# Patient Record
Sex: FEMALE | Race: WHITE | Employment: FULL TIME | URBAN - METROPOLITAN AREA
[De-identification: names, ages, dates, MRNs, and addresses within clinical notes are randomized per-mention and may not be internally consistent; named-entity substitution may affect disease eponyms.]

---

## 2017-03-21 ENCOUNTER — TELEPHONE (OUTPATIENT)
Dept: NEUROLOGY | Age: 32
End: 2017-03-21

## 2017-03-21 NOTE — TELEPHONE ENCOUNTER
Pt said she's been having to take extra dosages Gabapentin because of her headaches. She also said she can't touch the right side of her head.  Please give her a call back

## 2017-03-22 ENCOUNTER — OFFICE VISIT (OUTPATIENT)
Dept: INTERNAL MEDICINE CLINIC | Age: 32
End: 2017-03-22

## 2017-03-22 VITALS
OXYGEN SATURATION: 97 % | DIASTOLIC BLOOD PRESSURE: 70 MMHG | SYSTOLIC BLOOD PRESSURE: 118 MMHG | HEIGHT: 66 IN | WEIGHT: 136.4 LBS | TEMPERATURE: 98.3 F | BODY MASS INDEX: 21.92 KG/M2 | RESPIRATION RATE: 17 BRPM | HEART RATE: 95 BPM

## 2017-03-22 DIAGNOSIS — R53.83 OTHER FATIGUE: Primary | ICD-10-CM

## 2017-03-22 DIAGNOSIS — M32.9 SLE (SYSTEMIC LUPUS ERYTHEMATOSUS) (HCC): ICD-10-CM

## 2017-03-22 DIAGNOSIS — E03.9 ACQUIRED HYPOTHYROIDISM: ICD-10-CM

## 2017-03-22 NOTE — MR AVS SNAPSHOT
Visit Information Date & Time Provider Department Dept. Phone Encounter #  
 3/22/2017  8:45 AM Caryle Macho, MD [facility]  Follow-up Instructions Return in about 4 weeks (around 4/19/2017) for Physical - 30 minute appointment. Upcoming Health Maintenance Date Due INFLUENZA AGE 9 TO ADULT 8/1/2016 PAP AKA CERVICAL CYTOLOGY 2/10/2017 DTaP/Tdap/Td series (2 - Td) 2/1/2022 Allergies as of 3/22/2017  Review Complete On: 4/13/2016 By: Eli Curtis MD  
  
 Severity Noted Reaction Type Reactions Latex Medium 11/19/2012    Hives Guaifenesin  11/19/2012    Other (comments) Gained weight Ibuprofen  04/03/2011    Other (comments) \"started burning really bad\" Influenza Virus Vaccine, Specific  02/03/2016    Other (comments) Quadrivalent Severe HA and neurological symptoms Oxycodone  09/22/2014   Side Effect Other (comments) Shellfish Derived  04/07/2014    Rash  
 Sulfa (Sulfonamide Antibiotics)  01/24/2014   Systemic Hives, Other (comments)  
 achy [id number] Antimigraine Agents  10/02/2014    Palpitations Chest pain Wheat  11/19/2012    Other (comments) Mouth bleeds Current Immunizations  Reviewed on 3/10/2014 Name Date Hepatitis B Vaccine 8/1/2008, 3/1/2008, 2/1/2008 Influenza Vaccine 10/30/2013 Influenza Vaccine (Quad) PF 9/17/2014 TDAP Vaccine 2/1/2012 Not reviewed this visit You Were Diagnosed With   
  
 Codes Comments Other fatigue    -  Primary ICD-10-CM: R53.83 ICD-9-CM: 780.79 SLE (systemic lupus erythematosus) (HCC)     ICD-10-CM: M32.9 ICD-9-CM: 710.0 Acquired hypothyroidism     ICD-10-CM: E03.9 ICD-9-CM: 892. 9 Vitals BP Pulse Temp Resp Height(growth percentile) Weight(growth percentile) 118/70 (BP 1 Location: Right arm, BP Patient Position: Sitting) 95 98.3 °F (36.8 °C) 17 5' 6\" (1.676 m) 136 lb 6.4 oz (61.9 kg) LMP SpO2 BMI OB Status Smoking Status 03/08/2017 97% 22.02 kg/m2 Having regular periods Never Smoker Vitals History BMI and BSA Data Body Mass Index Body Surface Area 22.02 kg/m 2 1.7 m 2 Preferred Pharmacy Pharmacy Name Phone [facility] [address], [address] [facility]. [phone] Your Updated Medication List  
  
   
This list is accurate as of: 3/22/17  9:54 AM.  Always use your most recent med list. ALLEGRA 180 mg tablet Generic drug:  fexofenadine Take  by mouth. ARMOUR THYROID 90 mg tablet Generic drug:  thyroid (Pork) TAKE ONE TABLET BY MOUTH DAILY  
  
 baclofen 10 mg tablet Commonly known as:  LIORESAL Take 5 mg up to 3 times a day CALCIUM PO Take 1 tablet by mouth daily. gabapentin 300 mg capsule Commonly known as:  NEURONTIN Take 1 Cap by mouth three (3) times daily. multivitamin tablet Commonly known as:  ONE A DAY Take 2 Tabs by mouth daily. phenazopyridine 200 mg tablet Commonly known as:  PYRIDIUM Take 1 tablet by mouth three (3) times daily as needed for Pain.  
  
 potassium 99 mg tablet Take 99 mg by mouth every fourty-eight (48) hours. TYLENOL SINUS PO Take  by mouth. VITAMIN B-12 1,000 mcg tablet Generic drug:  cyanocobalamin Take 1,000 mcg by mouth daily. VITAMIN C PO Take 1 tablet by mouth daily. VITAMIN D3 PO Take 2,000 Units by mouth daily. We Performed the Following ODILIA IFA W/REFLEX  CASCADE [92286 CPT(R)] CBC WITH AUTOMATED DIFF [96499 CPT(R)] COMPLEMENT, C3 [id number] CPT(R)] COMPLEMENT, C4 [id number] CPT(R)] HEMOGLOBIN A1C WITH EAG [51209 CPT(R)] METABOLIC PANEL, COMPREHENSIVE [43496 CPT(R)] REFERRAL TO RHEUMATOLOGY [WBI86 Custom] SED RATE (ESR) [id number] CPT(R)] THYROID ANTIBODY PANEL [29834 CPT(R)] THYROID PANEL [id number] CPT(R)] TSH 3RD GENERATION [26178 CPT(R)] Follow-up Instructions Return in about 4 weeks (around 4/19/2017) for Physical - 30 minute appointment. Referral Information Referral ID Referred By Referred To  
  
 [id number] Puja Jaimes MD   
   [address] [facility], [address] [place] [address] Road Phone: [phone] Fax: [phone] Visits Status Start Date End Date 1 New Request 3/22/17 3/22/18 If your referral has a status of pending review or denied, additional information will be sent to support the outcome of this decision. Patient Instructions It was a pleasure to see you! As discussed: 
 
Complete the labs Schedule with Rheumatology Fatigue: Care Instructions Your Care Instructions Fatigue is a feeling of tiredness, exhaustion, or lack of energy. You may feel fatigue because of too much or not enough activity. It can also come from stress, lack of sleep, boredom, and poor diet. Many medical problems, such as viral infections, can cause fatigue. Emotional problems, especially depression, are often the cause of fatigue. Fatigue is most often a symptom of another problem. Treatment for fatigue depends on the cause. For example, if you have fatigue because you have a certain health problem, treating this problem also treats your fatigue. If depression or anxiety is the cause, treatment may help. Follow-up care is a key part of your treatment and safety. Be sure to make and go to all appointments, and call your doctor if you are having problems. It's also a good idea to know your test results and keep a list of the medicines you take. How can you care for yourself at home? · Get regular exercise. But don't overdo it. Go back and forth between rest and exercise. · Get plenty of rest. 
· Eat a healthy diet. Do not skip meals, especially breakfast. 
· Reduce your use of caffeine, tobacco, and alcohol. Caffeine is most often found in coffee, tea, cola drinks, and chocolate. · Limit medicines that can cause fatigue. This includes tranquilizers and cold and allergy medicines. When should you call for help? Watch closely for changes in your health, and be sure to contact your doctor if: 
· You have new symptoms such as fever or a rash. · Your fatigue gets worse. · You have been feeling down, depressed, or hopeless. Or you may have lost interest in things that you usually enjoy. · You are not getting better as expected. Where can you learn more? Go to http://margaret-nikki.info/. Enter P653 in the search box to learn more about \"Fatigue: Care Instructions. \" Current as of: May 27, 2016 Content Version: 11.1 © 7888-2034 [employer]. Care instructions adapted under license by [employer] (which disclaims liability or warranty for this information). If you have questions about a medical condition or this instruction, always ask your healthcare professional. [place] [address] any warranty or liability for your use of this information. Introducing [facility]! Dear Jarocho Wolfe: Thank you for requesting a [employer] account. Our records indicate that you already have an active [employer] account. You can access your account anytime at [email or web address] Did you know that you can access your hospital and ER discharge instructions at any time in [employer]? You can also review all of your test results from your hospital stay or ER visit. Additional Information If you have questions, please visit the Frequently Asked Questions section of the [employer] website at [email or web address] Remember, [employer] is NOT to be used for urgent needs. For medical emergencies, dial 911. Now available from your iPhone and Android! Please provide this summary of care documentation to your next provider. Your primary care clinician is listed as Suly Friendly.  If you have any questions after today's visit, please call [phone].
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
## 2017-03-22 NOTE — PATIENT INSTRUCTIONS
It was a pleasure to see you! As discussed:    Complete the labs   Schedule with Rheumatology     Fatigue: Care Instructions  Your Care Instructions  Fatigue is a feeling of tiredness, exhaustion, or lack of energy. You may feel fatigue because of too much or not enough activity. It can also come from stress, lack of sleep, boredom, and poor diet. Many medical problems, such as viral infections, can cause fatigue. Emotional problems, especially depression, are often the cause of fatigue. Fatigue is most often a symptom of another problem. Treatment for fatigue depends on the cause. For example, if you have fatigue because you have a certain health problem, treating this problem also treats your fatigue. If depression or anxiety is the cause, treatment may help. Follow-up care is a key part of your treatment and safety. Be sure to make and go to all appointments, and call your doctor if you are having problems. It's also a good idea to know your test results and keep a list of the medicines you take. How can you care for yourself at home? · Get regular exercise. But don't overdo it. Go back and forth between rest and exercise. · Get plenty of rest.  · Eat a healthy diet. Do not skip meals, especially breakfast.  · Reduce your use of caffeine, tobacco, and alcohol. Caffeine is most often found in coffee, tea, cola drinks, and chocolate. · Limit medicines that can cause fatigue. This includes tranquilizers and cold and allergy medicines. When should you call for help? Watch closely for changes in your health, and be sure to contact your doctor if:  · You have new symptoms such as fever or a rash. · Your fatigue gets worse. · You have been feeling down, depressed, or hopeless. Or you may have lost interest in things that you usually enjoy. · You are not getting better as expected. Where can you learn more? Go to http://margaret-nikki.info/.   Enter W892 in the search box to learn more about \"Fatigue: Care Instructions. \"  Current as of: May 27, 2016  Content Version: 11.1  © 3648-3602 [employer]. Care instructions adapted under license by [employer] (which disclaims liability or warranty for this information). If you have questions about a medical condition or this instruction, always ask your healthcare professional. [address] any warranty or liability for your use of this information.

## 2017-04-11 ENCOUNTER — OFFICE VISIT (OUTPATIENT)
Dept: NEUROLOGY | Age: 32
End: 2017-04-11

## 2017-04-11 VITALS
HEART RATE: 85 BPM | HEIGHT: 66 IN | WEIGHT: 133 LBS | OXYGEN SATURATION: 98 % | DIASTOLIC BLOOD PRESSURE: 72 MMHG | BODY MASS INDEX: 21.38 KG/M2 | SYSTOLIC BLOOD PRESSURE: 110 MMHG | RESPIRATION RATE: 15 BRPM

## 2017-04-11 DIAGNOSIS — M54.81 OCCIPITAL NEURALGIA OF RIGHT SIDE: Primary | ICD-10-CM

## 2017-04-11 DIAGNOSIS — E55.9 VITAMIN D DEFICIENCY: ICD-10-CM

## 2017-04-11 RX ORDER — LAMOTRIGINE 25 MG/1
TABLET ORAL
Qty: 60 TAB | Refills: 1 | Status: SHIPPED | OUTPATIENT
Start: 2017-04-11 | End: 2017-06-06 | Stop reason: SDUPTHER

## 2017-04-11 NOTE — PROGRESS NOTES
Chief Complaint   Patient presents with    Migraine         HISTORY OF PRESENT ILLNESS  Suzanne Acosta came back for a follow up. She was doing quite well until about a few weeks ago when the occipital pain came back. The pain radiates to the vertex and is quite disabling. She has been using gabapentin up to 1200 mg at bedtime which helps but makes her very tired the next day. She was prescribed baclofen before but she never tried it because she was concerned about side effects. Current Outpatient Prescriptions   Medication Sig    lamoTRIgine (LAMICTAL) 25 mg tablet 1 daily for 2 weeks, then 2 daily    gabapentin (NEURONTIN) 300 mg capsule Take 1 Cap by mouth three (3) times daily.  multivitamin (ONE A DAY) tablet Take 2 Tabs by mouth daily.  CHOLECALCIFEROL, VITAMIN D3, (VITAMIN D3 PO) Take 2,000 Units by mouth daily.  ARMOUR THYROID 90 mg tablet TAKE ONE TABLET BY MOUTH DAILY    fexofenadine (ALLEGRA) 180 mg tablet Take  by mouth.  baclofen (LIORESAL) 10 mg tablet Take 5 mg up to 3 times a day    PSEUDOEPHEDRINE/ACETAMINOPHEN (TYLENOL SINUS PO) Take  by mouth.  CALCIUM PO Take 1 tablet by mouth daily.  ASCORBATE CALCIUM (VITAMIN C PO) Take 1 tablet by mouth daily.  phenazopyridine (PYRIDIUM) 200 mg tablet Take 1 tablet by mouth three (3) times daily as needed for Pain.  potassium 99 mg tablet Take 99 mg by mouth every fourty-eight (48) hours.  cyanocobalamin (VITAMIN B-12) 1,000 mcg tablet Take 1,000 mcg by mouth daily. No current facility-administered medications for this visit. PHYSICAL EXAMINATION:    Visit Vitals    /72    Pulse 85    Resp 15    Ht 5' 6\" (1.676 m)    Wt 60.3 kg (133 lb)    SpO2 98%    BMI 21.47 kg/m2       NEUROLOGICAL EXAMINATION:     Mental Status:   Alert and oriented to person, place, and time with recent and remote memory intact. Attention span and concentration are normal. Speech is fluent with a full fund of knowledge. Cranial Nerves:    II, III, IV, VI:  Visual acuity grossly intact. Visual fields are normal.    Pupils are equal, round, and reactive to light and accommodation. Extra-ocular movements are full and fluid. Fundoscopic exam was benign, no ptosis or nystagmus. V-XII: Hearing is grossly intact. Facial features are symmetric, with normal sensation and strength. The palate rises symmetrically and the tongue protrudes midline. Sternocleidomastoids 5/5. Motor Examination: Normal tone, bulk, and strength. 5/5 muscle strength throughout. No cogwheel rigidity or clonus present. Sensory exam:  Normal throughout to pinprick, temperature, and vibration sense. Normal proprioception. Coordination:  Heel-to-shin was smooth and symmetrical bilaterally. Finger to nose and rapid arm movement testing was normal.   No resting or intention tremor    Gait and Station:  Steady while walking on toes, heels, and with tandem walking. Normal arm swing. No Rhomberg or pronator drift. No muscle wasting or fasiculations noted. Reflexes:  DTRs 2+ throughout. Toes downgoing. LABS / IMAGING  MRI of the brain with and without contrast was normal.    ASSESSMENT    ICD-10-CM ICD-9-CM    1. Occipital neuralgia of right side M54.81 723.8 lamoTRIgine (LAMICTAL) 25 mg tablet   2. Vitamin D deficiency E55.9 268.9 VITAMIN D, 1, 25 DIHYDROXY     PROCEDURE NOTE:  We discussed technical aspects, meds used, potential benefits (resolution or improvement of sx) as well as potential risks (bleeding, infection, lack of efficacy) of occipital nerve block and she would like to proceed. An occipital nerve was performed on right side using a mixture of 1 ml of 10mg/ml kenalog along with 2 ml of 1% Lidocaine (without epinephrine). 3 ml of mixture was injected on each side. A 25 gauge, 1.5 inch needle was used. Patient tolerated the procedure well. There were no complications.        DISCUSSION  Ms. Kendrick Harris has occipital neuralgia with underlying migraines. An occipital nerve block was performed on the right side with immediate relief of her pain.   We will try her on lamotrigine 25 mg daily for  2 weeks and then 50 mg daily  Side effect profile of this medication was discussed including a rash  She may wean off gabapentin slowly over the next 2-3 weeks    Rachana Abraham MD  Diplomate, American Board of Psychiatry & Neurology (Neurology)  Diplomate, [address] [place] [address] Rd., [address] of Psychiatry & Neurology (Clinical Neurophysiology)  Diplomate, American Board of Electrodiagnostic Medicine

## 2017-04-11 NOTE — MR AVS SNAPSHOT
Visit Information Date & Time Provider Department Dept. Phone Encounter #  
 4/11/2017  3:30 PM Krystal Rothman MD [facility] at [facility] [phone] Follow-up Instructions Return in about 3 months (around 7/11/2017). Upcoming Health Maintenance Date Due INFLUENZA AGE 9 TO ADULT 8/1/2016 PAP AKA CERVICAL CYTOLOGY 2/10/2017 DTaP/Tdap/Td series (2 - Td) 2/1/2022 Allergies as of 4/11/2017  Review Complete On: 4/11/2017 By: Krystal Rothman MD  
  
 Severity Noted Reaction Type Reactions Latex Medium 11/19/2012    Hives Guaifenesin  11/19/2012    Other (comments) Gained weight Ibuprofen  04/03/2011    Other (comments) \"started burning really bad\" Influenza Virus Vaccine, Specific  02/03/2016    Other (comments) Quadrivalent Severe HA and neurological symptoms Oxycodone  09/22/2014   Side Effect Other (comments) Shellfish Derived  04/07/2014    Rash  
 Sulfa (Sulfonamide Antibiotics)  01/24/2014   Systemic Hives, Other (comments)  
 achy [id number] Antimigraine Agents  10/02/2014    Palpitations Chest pain Wheat  11/19/2012    Other (comments) Mouth bleeds Current Immunizations  Reviewed on 3/10/2014 Name Date Hepatitis B Vaccine 8/1/2008, 3/1/2008, 2/1/2008 Influenza Vaccine 10/30/2013 Influenza Vaccine (Quad) PF 9/17/2014 TDAP Vaccine 2/1/2012 Not reviewed this visit You Were Diagnosed With   
  
 Codes Comments Occipital neuralgia of right side    -  Primary ICD-10-CM: M54.81 ICD-9-CM: 723.8 Vitamin D deficiency     ICD-10-CM: E55.9 ICD-9-CM: 268.9 Vitals BP Pulse Resp Height(growth percentile) Weight(growth percentile) LMP  
 110/72 85 15 5' 6\" (1.676 m) 133 lb (60.3 kg) 03/08/2017 SpO2 BMI OB Status Smoking Status 98% 21.47 kg/m2 Having regular periods Never Smoker Vitals History BMI and BSA Data Body Mass Index Body Surface Area  
 21.47 kg/m 2 1.68 m 2 Preferred Pharmacy Pharmacy Name Phone [facility] [address]. [phone] Your Updated Medication List  
  
   
This list is accurate as of: 17  3:59 PM.  Always use your most recent med list. ALLEGRA 180 mg tablet Generic drug:  fexofenadine Take  by mouth. ARMOUR THYROID 90 mg tablet Generic drug:  thyroid (Pork) TAKE ONE TABLET BY MOUTH DAILY  
  
 baclofen 10 mg tablet Commonly known as:  LIORESAL Take 5 mg up to 3 times a day CALCIUM PO Take 1 tablet by mouth daily. gabapentin 300 mg capsule Commonly known as:  NEURONTIN Take 1 Cap by mouth three (3) times daily. lamoTRIgine 25 mg tablet Commonly known as: LaMICtal  
1 daily for 2 weeks, then 2 daily  
  
 multivitamin tablet Commonly known as:  ONE A DAY Take 2 Tabs by mouth daily. phenazopyridine 200 mg tablet Commonly known as:  PYRIDIUM Take 1 tablet by mouth three (3) times daily as needed for Pain.  
  
 potassium 99 mg tablet Take 99 mg by mouth every fourty-eight (48) hours. TYLENOL SINUS PO Take  by mouth. VITAMIN B-12 1,000 mcg tablet Generic drug:  cyanocobalamin Take 1,000 mcg by mouth daily. VITAMIN C PO Take 1 tablet by mouth daily. VITAMIN D3 PO Take 2,000 Units by mouth daily. Prescriptions Sent to Pharmacy Refills  
 lamoTRIgine (LAMICTAL) 25 mg tablet 1 Si daily for 2 weeks, then 2 daily Class: Normal  
 Pharmacy: [facility] [address], [address] [place] [address] RD.  #: [phone] We Performed the Following VITAMIN D, 1, 25 DIHYDROXY [37088 CPT(R)] Follow-up Instructions Return in about 3 months (around 2017). Patient Instructions A Healthy Lifestyle: Care Instructions Your Care Instructions A healthy lifestyle can help you feel good, stay at a healthy weight, and have plenty of energy for both work and play. A healthy lifestyle is something you can share with your whole family. A healthy lifestyle also can lower your risk for serious health problems, such as high blood pressure, heart disease, and diabetes. You can follow a few steps listed below to improve your health and the health of your family. Follow-up care is a key part of your treatment and safety. Be sure to make and go to all appointments, and call your doctor if you are having problems. Its also a good idea to know your test results and keep a list of the medicines you take. How can you care for yourself at home? · Do not eat too much sugar, fat, or fast foods. You can still have dessert and treats now and then. The goal is moderation. · Start small to improve your eating habits. Pay attention to portion sizes, drink less juice and soda pop, and eat more fruits and vegetables. ¨ Eat a healthy amount of food. A 3-ounce serving of meat, for example, is about the size of a deck of cards. Fill the rest of your plate with vegetables and whole grains. ¨ Limit the amount of soda and sports drinks you have every day. Drink more water when you are thirsty. ¨ Eat at least 5 servings of fruits and vegetables every day. It may seem like a lot, but it is not hard to reach this goal. A serving or helping is 1 piece of fruit, 1 cup of vegetables, or 2 cups of leafy, raw vegetables. Have an apple or some carrot sticks as an afternoon snack instead of a candy bar. Try to have fruits and/or vegetables at every meal. 
· Make exercise part of your daily routine. You may want to start with simple activities, such as walking, bicycling, or slow swimming. Try to be active 30 to 60 minutes every day. You do not need to do all 30 to 60 minutes all at once.  For example, you can exercise 3 times a day for 10 or 20 minutes. Moderate exercise is safe for most people, but it is always a good idea to talk to your doctor before starting an exercise program. 
· Keep moving. Thaddeus Colunga the lawn, work in the garden, or [employer]. Take the stairs instead of the elevator at work. · If you smoke, quit. People who smoke have an increased risk for heart attack, stroke, cancer, and other lung illnesses. Quitting is hard, but there are ways to boost your chance of quitting tobacco for good. ¨ Use nicotine gum, patches, or lozenges. ¨ Ask your doctor about stop-smoking programs and medicines. ¨ Keep trying. In addition to reducing your risk of diseases in the future, you will notice some benefits soon after you stop using tobacco. If you have shortness of breath or asthma symptoms, they will likely get better within a few weeks after you quit. · Limit how much alcohol you drink. Moderate amounts of alcohol (up to 2 drinks a day for men, 1 drink a day for women) are okay. But drinking too much can lead to liver problems, high blood pressure, and other health problems. Family health If you have a family, there are many things you can do together to improve your health. · Eat meals together as a family as often as possible. · Eat healthy foods. This includes fruits, vegetables, lean meats and dairy, and whole grains. · Include your family in your fitness plan. Most people think of activities such as jogging or tennis as the way to fitness, but there are many ways you and your family can be more active. Anything that makes you breathe hard and gets your heart pumping is exercise. Here are some tips: 
¨ Walk to do errands or to take your child to school or the bus. ¨ Go for a family bike ride after dinner instead of watching TV. Where can you learn more? Go to http://margaret-nikki.info/. Enter Y543 in the search box to learn more about \"A Healthy Lifestyle: Care Instructions. \" Current as of: July 26, 2016 Content Version: 11.2 © 5118-3732 Radialpoint, FriendsEAT. Care instructions adapted under license by SoundTag (which disclaims liability or warranty for this information). If you have questions about a medical condition or this instruction, always ask your healthcare professional. [address] any warranty or liability for your use of this information. Introducing hospitals & HEALTH SERVICES! Dear Triston Vargas: Thank you for requesting a Carbon Digital account. Our records indicate that you already have an active Carbon Digital account. You can access your account anytime at [email or web address]. [email or web address]/Movius Interactive Did you know that you can access your hospital and ER discharge instructions at any time in Carbon Digital? You can also review all of your test results from your hospital stay or ER visit. Additional Information If you have questions, please visit the Frequently Asked Questions section of the Carbon Digital website at [email or web address] Remember, Carbon Digital is NOT to be used for urgent needs. For medical emergencies, dial 911. Now available from your iPhone and Android! Please provide this summary of care documentation to your next provider. If you have any questions after today's visit, please call [phone].

## 2017-04-11 NOTE — PATIENT INSTRUCTIONS

## 2017-06-06 ENCOUNTER — TELEPHONE (OUTPATIENT)
Dept: NEUROLOGY | Age: 32
End: 2017-06-06

## 2017-06-06 DIAGNOSIS — G43.709 CHRONIC MIGRAINE WITHOUT AURA WITHOUT STATUS MIGRAINOSUS, NOT INTRACTABLE: ICD-10-CM

## 2017-06-06 DIAGNOSIS — M54.81 OCCIPITAL NEURALGIA OF RIGHT SIDE: ICD-10-CM

## 2017-06-06 RX ORDER — GABAPENTIN 300 MG/1
300 CAPSULE ORAL 3 TIMES DAILY
Qty: 90 CAP | Refills: 5 | Status: SHIPPED | OUTPATIENT
Start: 2017-06-06 | End: 2018-03-22 | Stop reason: ALTCHOICE

## 2017-06-06 RX ORDER — LAMOTRIGINE 25 MG/1
TABLET ORAL
Qty: 60 TAB | Refills: 3 | Status: SHIPPED | OUTPATIENT
Start: 2017-06-06 | End: 2018-03-22 | Stop reason: ALTCHOICE

## 2017-06-06 NOTE — TELEPHONE ENCOUNTER
Requested Prescriptions     Signed Prescriptions Disp Refills    lamoTRIgine (LAMICTAL) 25 mg tablet 60 Tab 3     Si mg BID     Authorizing Provider: Jenelle Porter      Pt calling she is still having headaches. Also, she doesn't have enough medication to get her through to her f/u August-she will be out this week. She also doesn't know if she is supposed to continue taking the Gabapentin with the Lamictal. If so, she will need a new prescription written. Please give her a call back.

## 2017-06-14 ENCOUNTER — TELEPHONE (OUTPATIENT)
Dept: NEUROLOGY | Age: 32
End: 2017-06-14

## 2017-06-14 NOTE — TELEPHONE ENCOUNTER
Pt needs a letter written that it would be best for her to be on day shift due to diagnosis. She said she is on probation for calling out so much.  Please give her a call back

## 2017-06-26 ENCOUNTER — TELEPHONE (OUTPATIENT)
Dept: NEUROLOGY | Age: 32
End: 2017-06-26

## 2017-06-26 RX ORDER — TRAMADOL HYDROCHLORIDE 50 MG/1
50 TABLET ORAL
Qty: 15 TAB | Refills: 0 | Status: SHIPPED | OUTPATIENT
Start: 2017-06-26 | End: 2017-12-28 | Stop reason: ALTCHOICE

## 2017-06-26 NOTE — TELEPHONE ENCOUNTER
Pt is in pain and she is not sure what to do. She has been taking tylenol around the clock and it is not helping. Please give her a call back.

## 2017-06-27 ENCOUNTER — OFFICE VISIT (OUTPATIENT)
Dept: NEUROLOGY | Age: 32
End: 2017-06-27

## 2017-06-27 VITALS
WEIGHT: 135 LBS | RESPIRATION RATE: 12 BRPM | DIASTOLIC BLOOD PRESSURE: 76 MMHG | BODY MASS INDEX: 21.69 KG/M2 | HEART RATE: 86 BPM | SYSTOLIC BLOOD PRESSURE: 118 MMHG | OXYGEN SATURATION: 98 % | HEIGHT: 66 IN

## 2017-06-27 DIAGNOSIS — M54.2 NECK PAIN: ICD-10-CM

## 2017-06-27 DIAGNOSIS — M79.7 FIBROMYALGIA: ICD-10-CM

## 2017-06-27 DIAGNOSIS — G43.709 CHRONIC MIGRAINE WITHOUT AURA WITHOUT STATUS MIGRAINOSUS, NOT INTRACTABLE: Primary | ICD-10-CM

## 2017-06-27 DIAGNOSIS — M54.81 OCCIPITAL NEURALGIA OF RIGHT SIDE: ICD-10-CM

## 2017-06-27 RX ORDER — DULOXETIN HYDROCHLORIDE 30 MG/1
CAPSULE, DELAYED RELEASE ORAL
Qty: 60 CAP | Refills: 1 | Status: SHIPPED | OUTPATIENT
Start: 2017-06-27 | End: 2017-06-30

## 2017-06-27 NOTE — PATIENT INSTRUCTIONS

## 2017-06-27 NOTE — MR AVS SNAPSHOT
Visit Information Date & Time Provider Department Dept. Phone Encounter #  
 6/27/2017  1:30 PM MD [facility]any [facility] at [address] [id number] Follow-up Instructions Return if symptoms worsen or fail to improve. Your Appointments 8/3/2017  8:00 AM  
Follow Up with MD Dany Slaughter [facility] at [facility]) Appt Note: 3 month f/u headaches KU 4/11  
 [address] [place] [id number]  
[phone]  
  
   
 [address]  [address] Upcoming Health Maintenance Date Due  
 PAP AKA CERVICAL CYTOLOGY 2/10/2017 INFLUENZA AGE 9 TO ADULT 8/1/2017 DTaP/Tdap/Td series (2 - Td) 2/1/2022 Allergies as of 6/27/2017  Review Complete On: 6/27/2017 By: Annemarie Crocker MD  
  
 Severity Noted Reaction Type Reactions Latex Medium 11/19/2012    Hives Guaifenesin  11/19/2012    Other (comments) Gained weight Ibuprofen  04/03/2011    Other (comments) \"started burning really bad\" Influenza Virus Vaccine, Specific  02/03/2016    Other (comments) Quadrivalent Severe HA and neurological symptoms Oxycodone  09/22/2014   Side Effect Other (comments) Shellfish Derived  04/07/2014    Rash  
 Sulfa (Sulfonamide Antibiotics)  01/24/2014   Systemic Hives, Other (comments)  
 achy [id number] Antimigraine Agents  10/02/2014    Palpitations Chest pain Wheat  11/19/2012    Other (comments) Mouth bleeds Current Immunizations  Reviewed on 3/10/2014 Name Date Hepatitis B Vaccine 8/1/2008, 3/1/2008, 2/1/2008 Influenza Vaccine 10/30/2013 Influenza Vaccine (Quad) PF 9/17/2014 TDAP Vaccine 2/1/2012 Not reviewed this visit You Were Diagnosed With   
  
 Codes Comments Chronic migraine without aura without status migrainosus, not intractable    -  Primary ICD-10-CM: C84.557 ICD-9-CM: 346.70 Fibromyalgia     ICD-10-CM: M79.7 ICD-9-CM: 729.1 Neck pain     ICD-10-CM: M54.2 ICD-9-CM: 723.1 Vitals BP Pulse Resp Height(growth percentile) Weight(growth percentile) SpO2  
 118/76 86 12 5' 6\" (1.676 m) 135 lb (61.2 kg) 98% BMI OB Status Smoking Status 21.79 kg/m2 Having regular periods Never Smoker Vitals History BMI and BSA Data Body Mass Index Body Surface Area 21.79 kg/m 2 1.69 m 2 Preferred Pharmacy Pharmacy Name Phone [facility] [address] [place] [address] [address] [place], [facility]. [phone] Your Updated Medication List  
  
   
This list is accurate as of: 6/27/17  2:14 PM.  Always use your most recent med list. ALLEGRA 180 mg tablet Generic drug:  fexofenadine Take  by mouth. ARMOUR THYROID 90 mg tablet Generic drug:  thyroid (Pork) TAKE ONE TABLET BY MOUTH DAILY CALCIUM PO Take 1 tablet by mouth daily. DULoxetine 30 mg capsule Commonly known as:  CYMBALTA 1 daily for 1 week, then 2 daily  
  
 gabapentin 300 mg capsule Commonly known as:  NEURONTIN Take 1 Cap by mouth three (3) times daily. lamoTRIgine 25 mg tablet Commonly known as: LaMICtal  
50 mg BID  
  
 multivitamin tablet Commonly known as:  ONE A DAY Take 2 Tabs by mouth daily. phenazopyridine 200 mg tablet Commonly known as:  PYRIDIUM Take 1 tablet by mouth three (3) times daily as needed for Pain.  
  
 potassium 99 mg tablet Take 99 mg by mouth every fourty-eight (48) hours. traMADol 50 mg tablet Commonly known as:  ULTRAM  
Take 1 Tab by mouth every six (6) hours as needed for Pain. Max Daily Amount: 200 mg.  
  
 TYLENOL SINUS PO Take  by mouth. VITAMIN B-12 1,000 mcg tablet Generic drug:  cyanocobalamin Take 1,000 mcg by mouth daily. VITAMIN C PO Take 1 tablet by mouth daily. VITAMIN D3 PO Take 2,000 Units by mouth daily. Prescriptions Sent to Pharmacy Refills DULoxetine (CYMBALTA) 30 mg capsule 1 Si daily for 1 week, then 2 daily Class: Normal  
 Pharmacy: [facility] [address], [address] [place] [address] RD.  #: [phone] We Performed the Following REFERRAL TO PHYSICAL THERAPY [MYK18 Custom] Comments:  
 Please evaluate patient for neck pain, headache and dry needling Follow-up Instructions Return if symptoms worsen or fail to improve. Referral Information Referral ID Referred By Referred To  
  
 [id number] Keisha Lisandro X[facility]G Not Available Visits Status Start Date End Date 1 New Request 17 If your referral has a status of pending review or denied, additional information will be sent to support the outcome of this decision. Patient Instructions A Healthy Lifestyle: Care Instructions Your Care Instructions A healthy lifestyle can help you feel good, stay at a healthy weight, and have plenty of energy for both work and play. A healthy lifestyle is something you can share with your whole family. A healthy lifestyle also can lower your risk for serious health problems, such as high blood pressure, heart disease, and diabetes. You can follow a few steps listed below to improve your health and the health of your family. Follow-up care is a key part of your treatment and safety. Be sure to make and go to all appointments, and call your doctor if you are having problems. Its also a good idea to know your test results and keep a list of the medicines you take. How can you care for yourself at home? · Do not eat too much sugar, fat, or fast foods. You can still have dessert and treats now and then. The goal is moderation. · Start small to improve your eating habits. Pay attention to portion sizes, drink less juice and soda pop, and eat more fruits and vegetables. ¨ Eat a healthy amount of food. A 3-ounce serving of meat, for example, is about the size of a deck of cards. Fill the rest of your plate with vegetables and whole grains. ¨ Limit the amount of soda and sports drinks you have every day. Drink more water when you are thirsty. ¨ Eat at least 5 servings of fruits and vegetables every day. It may seem like a lot, but it is not hard to reach this goal. A serving or helping is 1 piece of fruit, 1 cup of vegetables, or 2 cups of leafy, raw vegetables. Have an apple or some carrot sticks as an afternoon snack instead of a candy bar. Try to have fruits and/or vegetables at every meal. 
· Make exercise part of your daily routine. You may want to start with simple activities, such as walking, bicycling, or slow swimming. Try to be active 30 to 60 minutes every day. You do not need to do all 30 to 60 minutes all at once. For example, you can exercise 3 times a day for 10 or 20 minutes. Moderate exercise is safe for most people, but it is always a good idea to talk to your doctor before starting an exercise program. 
· Keep moving. Seferino Sigalaal the lawn, work in the garden, or [employer]. Take the stairs instead of the elevator at work. · If you smoke, quit. People who smoke have an increased risk for heart attack, stroke, cancer, and other lung illnesses. Quitting is hard, but there are ways to boost your chance of quitting tobacco for good. ¨ Use nicotine gum, patches, or lozenges. ¨ Ask your doctor about stop-smoking programs and medicines. ¨ Keep trying. In addition to reducing your risk of diseases in the future, you will notice some benefits soon after you stop using tobacco. If you have shortness of breath or asthma symptoms, they will likely get better within a few weeks after you quit. · Limit how much alcohol you drink. Moderate amounts of alcohol (up to 2 drinks a day for men, 1 drink a day for women) are okay.  But drinking too much can lead to liver problems, high blood pressure, and other health problems. Family health If you have a family, there are many things you can do together to improve your health. · Eat meals together as a family as often as possible. · Eat healthy foods. This includes fruits, vegetables, lean meats and dairy, and whole grains. · Include your family in your fitness plan. Most people think of activities such as jogging or tennis as the way to fitness, but there are many ways you and your family can be more active. Anything that makes you breathe hard and gets your heart pumping is exercise. Here are some tips: 
¨ Walk to do errands or to take your child to school or the bus. ¨ Go for a family bike ride after dinner instead of watching TV. Where can you learn more? Go to http://margaret-nikki.info/. Enter G176 in the search box to learn more about \"A Healthy Lifestyle: Care Instructions. \" Current as of: July 26, 2016 Content Version: 11.3 © 3749-8416 IMT. Care instructions adapted under license by Imagine K12 (which disclaims liability or warranty for this information). If you have questions about a medical condition or this instruction, always ask your healthcare professional. [address] any warranty or liability for your use of this information. Introducing [facility] & HEALTH SERVICES! Dear Lauren Garcia: Thank you for requesting a Voluntis account. Our records indicate that you already have an active Voluntis account. You can access your account anytime at [email or web address]. Archetype Media/Amazing Hiring Did you know that you can access your hospital and ER discharge instructions at any time in Voluntis? You can also review all of your test results from your hospital stay or ER visit. Additional Information If you have questions, please visit the Frequently Asked Questions section of the Songtradr website at [email or web address] Remember, Songtradr is NOT to be used for urgent needs. For medical emergencies, dial 911. Now available from your iPhone and Android! Please provide this summary of care documentation to your next provider. If you have any questions after today's visit, please call [phone].

## 2017-06-27 NOTE — PROGRESS NOTES
Chief Complaint   Patient presents with    Neurologic Problem         HISTORY OF PRESENT ILLNESS  Kierra Acosta came back for a follow up. She came in because her pain became intolerable again a few days ago . She had an occipital nerve block done in April and it helped for some time. She also takes lamotrigine and was doing okay until a few days ago when she developed right occipital pain and then eventually a migraine associated with light sensitivity, nausea and vomiting. She is on gabapentin 300 mill grams twice a day but over the last 2 days she has been taking 600 mg twice daily. She was prescribed baclofen before but she never tried it because she was concerned about side effects. She has been told that she has fibromyalgia and possibly lupus    Current Outpatient Prescriptions   Medication Sig    DULoxetine (CYMBALTA) 30 mg capsule 1 daily for 1 week, then 2 daily    traMADol (ULTRAM) 50 mg tablet Take 1 Tab by mouth every six (6) hours as needed for Pain. Max Daily Amount: 200 mg.    lamoTRIgine (LAMICTAL) 25 mg tablet 50 mg BID    gabapentin (NEURONTIN) 300 mg capsule Take 1 Cap by mouth three (3) times daily.  ARMOUR THYROID 90 mg tablet TAKE ONE TABLET BY MOUTH DAILY    fexofenadine (ALLEGRA) 180 mg tablet Take  by mouth.  PSEUDOEPHEDRINE/ACETAMINOPHEN (TYLENOL SINUS PO) Take  by mouth.  CALCIUM PO Take 1 tablet by mouth daily.  ASCORBATE CALCIUM (VITAMIN C PO) Take 1 tablet by mouth daily.  phenazopyridine (PYRIDIUM) 200 mg tablet Take 1 tablet by mouth three (3) times daily as needed for Pain.  potassium 99 mg tablet Take 99 mg by mouth every fourty-eight (48) hours.  cyanocobalamin (VITAMIN B-12) 1,000 mcg tablet Take 1,000 mcg by mouth daily.  multivitamin (ONE A DAY) tablet Take 2 Tabs by mouth daily.  CHOLECALCIFEROL, VITAMIN D3, (VITAMIN D3 PO) Take 2,000 Units by mouth daily. No current facility-administered medications for this visit. PHYSICAL EXAMINATION:    Visit Vitals    /76    Pulse 86    Resp 12    Ht 5' 6\" (1.676 m)    Wt 61.2 kg (135 lb)    SpO2 98%    BMI 21.79 kg/m2       NEUROLOGICAL EXAMINATION:     Mental Status:   Alert and oriented to person, place, and time with recent and remote memory intact. Attention span and concentration are normal. Speech is fluent with a full fund of knowledge. Cranial Nerves:    II, III, IV, VI:  Visual acuity grossly intact. Visual fields are normal.    Pupils are equal, round, and reactive to light and accommodation. Extra-ocular movements are full and fluid. Fundoscopic exam was benign, no ptosis or nystagmus. V-XII: Hearing is grossly intact. Facial features are symmetric, with normal sensation and strength. The palate rises symmetrically and the tongue protrudes midline. Sternocleidomastoids 5/5. Motor Examination: Normal tone, bulk, and strength. 5/5 muscle strength throughout. No cogwheel rigidity or clonus present. Sensory exam:  Normal throughout to pinprick, temperature, and vibration sense. Normal proprioception. Coordination:  Heel-to-shin was smooth and symmetrical bilaterally. Finger to nose and rapid arm movement testing was normal.   No resting or intention tremor    Gait and Station:  Steady while walking on toes, heels, and with tandem walking. Normal arm swing. No Rhomberg or pronator drift. No muscle wasting or fasiculations noted. Reflexes:  DTRs 2+ throughout. Toes downgoing. LABS / IMAGING  MRI of the brain with and without contrast was normal.    ASSESSMENT    ICD-10-CM ICD-9-CM    1. Chronic migraine without aura without status migrainosus, not intractable G43.709 346.70 DULoxetine (CYMBALTA) 30 mg capsule   2.  Fibromyalgia M79.7 729.1 DULoxetine (CYMBALTA) 30 mg capsule      REFERRAL TO PHYSICAL THERAPY   3. Neck pain M54.2 723.1 REFERRAL TO PHYSICAL THERAPY     PROCEDURE NOTE:  We discussed technical aspects, meds used, potential benefits (resolution or improvement of sx) as well as potential risks (bleeding, infection, lack of efficacy) of occipital nerve block and she would like to proceed. An occipital nerve was performed on right side using a mixture of 1 ml of 10mg/ml kenalog along with 2 ml of 1% Lidocaine (without epinephrine). 3 ml of mixture was injected. A 25 gauge, 1.5 inch needle was used. Patient tolerated the procedure well. There were no complications. DISCUSSION  Ms. Weaver Score has occipital neuralgia with underlying migraines. An occipital nerve block was performed on the right side with immediate relief of her pain. She may discontinue lamotrigine and start Cymbalta 30 mg daily for 1 week and then 60 mg daily. This may help with her fibromyalgia symptoms as well.    Continue gabapentin     Agatha Butt MD  Diplomate, American Board of Psychiatry & Neurology (Neurology)  [place] Board of Psychiatry & Neurology (Clinical Neurophysiology)  Diplomate, American Board of Electrodiagnostic Medicine

## 2017-06-29 ENCOUNTER — TELEPHONE (OUTPATIENT)
Dept: NEUROLOGY | Age: 32
End: 2017-06-29

## 2017-06-29 NOTE — TELEPHONE ENCOUNTER
Spoke with patient. Informed to stop cymbalta and restart gabapentin per Dr. Juan Daniel Jaffe. She verbalized understanding.

## 2017-06-29 NOTE — TELEPHONE ENCOUNTER
Message from [employer]. \"I just started a new medication yesterday and now I am getting widening pupils and the info sheet said consult \"

## 2017-06-29 NOTE — TELEPHONE ENCOUNTER
Pt calling in regards to the medication that Dr. Simons Said just prescribed for pt. Pt stated that she will not be able to take the mediation any longer, she is shaking, has dilated pupils, frontal headaches, and gastro problems. Pt requesting a call today please.

## 2017-06-30 ENCOUNTER — HOSPITAL ENCOUNTER (EMERGENCY)
Age: 32
Discharge: HOME | End: 2017-06-30
Attending: EMERGENCY MEDICINE | Admitting: EMERGENCY MEDICINE
Payer: COMMERCIAL

## 2017-06-30 VITALS
TEMPERATURE: 98.1 F | BODY MASS INDEX: 21.08 KG/M2 | DIASTOLIC BLOOD PRESSURE: 79 MMHG | SYSTOLIC BLOOD PRESSURE: 148 MMHG | OXYGEN SATURATION: 100 % | HEIGHT: 66 IN | HEART RATE: 100 BPM | WEIGHT: 131.17 LBS | RESPIRATION RATE: 16 BRPM

## 2017-06-30 DIAGNOSIS — R11.0 NAUSEA WITHOUT VOMITING: ICD-10-CM

## 2017-06-30 DIAGNOSIS — T50.905A MEDICATION SIDE EFFECT, INITIAL ENCOUNTER: Primary | ICD-10-CM

## 2017-06-30 DIAGNOSIS — R45.0 JITTERY: ICD-10-CM

## 2017-06-30 DIAGNOSIS — M54.81 BILATERAL OCCIPITAL NEURALGIA: ICD-10-CM

## 2017-06-30 DIAGNOSIS — F41.1 ANXIETY STATE: ICD-10-CM

## 2017-06-30 PROCEDURE — 96374 THER/PROPH/DIAG INJ IV PUSH: CPT

## 2017-06-30 PROCEDURE — 99282 EMERGENCY DEPT VISIT SF MDM: CPT

## 2017-06-30 PROCEDURE — 74011250636 HC RX REV CODE- 250/636: Performed by: EMERGENCY MEDICINE

## 2017-06-30 PROCEDURE — 96361 HYDRATE IV INFUSION ADD-ON: CPT

## 2017-06-30 PROCEDURE — 96375 TX/PRO/DX INJ NEW DRUG ADDON: CPT

## 2017-06-30 RX ORDER — LORAZEPAM 0.5 MG/1
0.5 TABLET ORAL
Qty: 3 TAB | Refills: 0 | Status: SHIPPED | OUTPATIENT
Start: 2017-06-30 | End: 2018-03-22 | Stop reason: ALTCHOICE

## 2017-06-30 RX ORDER — KETOROLAC TROMETHAMINE 30 MG/ML
15 INJECTION, SOLUTION INTRAMUSCULAR; INTRAVENOUS
Status: COMPLETED | OUTPATIENT
Start: 2017-06-30 | End: 2017-06-30

## 2017-06-30 RX ORDER — ONDANSETRON 4 MG/1
4 TABLET, FILM COATED ORAL
Qty: 20 TAB | Refills: 0 | Status: SHIPPED | OUTPATIENT
Start: 2017-06-30 | End: 2018-03-22 | Stop reason: ALTCHOICE

## 2017-06-30 RX ORDER — ONDANSETRON 2 MG/ML
4 INJECTION INTRAMUSCULAR; INTRAVENOUS
Status: COMPLETED | OUTPATIENT
Start: 2017-06-30 | End: 2017-06-30

## 2017-06-30 RX ORDER — LORAZEPAM 2 MG/ML
0.5 INJECTION INTRAMUSCULAR
Status: COMPLETED | OUTPATIENT
Start: 2017-06-30 | End: 2017-06-30

## 2017-06-30 RX ADMIN — SODIUM CHLORIDE 1000 ML: 900 INJECTION, SOLUTION INTRAVENOUS at 15:53

## 2017-06-30 RX ADMIN — KETOROLAC TROMETHAMINE 15 MG: 30 INJECTION, SOLUTION INTRAMUSCULAR at 15:55

## 2017-06-30 RX ADMIN — LORAZEPAM 0.5 MG: 2 INJECTION INTRAMUSCULAR; INTRAVENOUS at 15:57

## 2017-06-30 RX ADMIN — ONDANSETRON 4 MG: 2 INJECTION INTRAMUSCULAR; INTRAVENOUS at 15:54

## 2017-06-30 NOTE — ED PROVIDER NOTES
HPI Comments: Lalo Cramer is a 29 y.o. female, pmhx significant for Lupus, GERD, hydronephrosis, lumbago, mitral valve prolapse, and occipital neuralgia, who presents ambulatory with family to the ED c/o increased anxiety, and constant frontal headache after being switched from Lamictal and gabapentin to Cymbalta x 4 days ago. Pt notes associated palpitations, nausea, decreased appetite, and feeling jittery. Pt states that she was taking 50mg Lamictal BID, and 300mg gabapentin in the morning, and 900mg of gabapentin at night. Pt called her neurologist to explain the symptoms she was having, endorsing concern for possible side effect to the Cymbalta, and was instructed to stop the Cymbalta for 24hrs and re-evaluate how she was feeling. Pt states that she had \"anxiety attacks\" last night, and presents to the ED for further evaluation of her symptoms. Pt denies taking any other OTC medications for her symptoms. Pt denies vomiting, fever, chills, cough, and abdominal pain. PCP: PROVIDER UNKNOWN  Neurologist: Tony Chicas MD     PSHx: Significant for adenoidectomy, tonsillectomy, EGD, colonoscopy   Social Hx: (-) tobacco, (-) EtOH,  (-) Illicit Drugs    There are no other complaints, changes, or physical findings at this time. The history is provided by the patient. No  was used.         Past Medical History:   Diagnosis Date    Abdominal pain, other specified site     Calculus of kidney     RIGHT    Fibromyalgia 06/2016    [facility]    GERD (gastroesophageal reflux disease)     Hydronephrosis     Hypoglycemia, unspecified     Irregular menstrual cycle     Lumbago     Lupus ([address] 06/2016    [facility]    Migraine with aura, without mention of intractable migraine without mention of status migrainosus     Other ill-defined conditions     mitral valve prolapse    Sacroiliitis, not elsewhere classified [facility])        Past Surgical History:   Procedure Laterality Date    HX ADENOIDECTOMY      HX TONSILLECTOMY      ND COLONOSCOPY FLX DX W/COLLJ SPEC WHEN PFRMD  4/7/2014         ND EGD TRANSORAL BIOPSY SINGLE/MULTIPLE  4/7/2014              Family History:   Problem Relation Age of Onset    Cancer Mother     Diabetes Mother     Arthritis-osteo Mother     Thyroid Disease Mother    [facility] Mig[facility] Mother     Seizures Mother     Diabetes Maternal Grandmother     Rashes/Skin Problems Maternal Grandmother     Diabetes Maternal Grandfather     Heart Disease Maternal Grandfather     Heart Attack Maternal Grandfather     Hypertension Maternal Grandfather     High Cholesterol Maternal Grandfather     Stroke Maternal Grandfather        Social History     Social History    Marital status: SINGLE     Spouse name: N/A    Number of children: 0    Years of education: N/A     Occupational History     [employer]     Social History Main Topics    Smoking status: Never Smoker    Smokeless tobacco: Never Used    Alcohol use No    Drug use: No    Sexual activity: No     Other Topics Concern    Not on file     Social History Narrative         ALLERGIES: Latex; Guaifenesin; Ibuprofen; Influenza virus vaccine, specific; Oxycodone; Shellfish derived; Sulfa (sulfonamide antibiotics); [id number] antimigraine agents; and Wheat    Review of Systems   Constitutional: Positive for appetite change. Negative for chills and fever. Respiratory: Negative for cough and shortness of breath. Cardiovascular: Positive for palpitations. Negative for chest pain. Gastrointestinal: Positive for nausea. Negative for constipation, diarrhea and vomiting. Neurological: Positive for headaches. Negative for weakness and numbness. Psychiatric/Behavioral: The patient is nervous/anxious. All other systems reviewed and are negative.     Patient Vitals for the past 12 hrs:   Temp Pulse Resp BP SpO2   06/30/17 1510 98.1 °F (36.7 °C) 100 16 148/79 100 %       Physical Exam Constitutional: She is oriented to person, place, and time. She appears well-developed and well-nourished. Appears tired   HENT:   Head: Normocephalic. Dry mucous membranes   Eyes: EOM are normal. Pupils are equal, round, and reactive to light. Neck: Normal range of motion. Cardiovascular: Normal rate and regular rhythm. Pulmonary/Chest: Effort normal and breath sounds normal.   Abdominal: Soft. She exhibits no distension. There is no tenderness. Neurological: She is alert and oriented to person, place, and time. No cranial nerve deficit. CN 2-12 intact, 5/5 strength in all 4 extremities, Finger to nose intact, negative Romberg, normal gait   Skin: Skin is warm and dry. Red splotches on face   Psychiatric: She has a normal mood and affect. Nursing note and vitals reviewed. MDM  Number of Diagnoses or Management Options  Anxiety state:   Bilateral occipital neuralgia:   Jittery:   Medication side effect, initial encounter:   Nausea without vomiting:   Diagnosis management comments:   Patient presents for nausea, and jittery feeling after stopping Lamictal and gabapentin to Cymbalta. Withdrawal from Lamictal vs gabapentin, medication side effect of Cymbalta, viral illness. No other focal issues to be concerned about other acute pathologies. Amount and/or Complexity of Data Reviewed  Review and summarize past medical records: yes    Patient Progress  Patient progress: stable    ED Course       Procedures    Progress Note:  4:11 PM  Pt re-evaluated. She reports feeling better after medications given to her in the ED. Will continue to monitor. Written by Pablo Galeano ED Scribe, as dictated by Casey Swenson M.D. Progress Note:  4:40 PM  Pt-revaluated. She reports that her jitteriness is much improved. Discussed discharge medications, including 3 tabs of Ativan in case she feels a panic attack coming on.  Pt states that she will take 300mg gabapentin tomorrow before going to work, and schedule follow-up appointment with Dr. Noble Pressley. Written by RENEE Coker, as dictated by Megan Grimaldo M.D. Progress Note:  4:43 PM  Discussed workup results/findings, and counseled pt regarding her diagnosis. Pt has been encouraged to follow-up as instructed. All questions have been answered, and pt conveyed understanding. Written by RENEE Coker, as dictated by Megan Grimaldo M.D.    Marialuisa Mitzi:  Medications   sodium chloride 0.9 % bolus infusion 1,000 mL (1,000 mL IntraVENous New Bag 6/30/17 1553)   ondansetron (ZOFRAN) injection 4 mg (4 mg IntraVENous Given 6/30/17 1554)   ketorolac (TORADOL) injection 15 mg (15 mg IntraVENous Given 6/30/17 1555)   LORazepam (ATIVAN) injection 0.5 mg (0.5 mg IntraVENous Given 6/30/17 1557)       IMPRESSION:  1. Medication side effect, initial encounter    2. Jittery    3. Nausea without vomiting    4. Anxiety state    5. Bilateral occipital neuralgia        PLAN:  1. Discharge home. Current Discharge Medication List      START taking these medications    Details   ondansetron hcl (ZOFRAN, AS HYDROCHLORIDE,) 4 mg tablet Take 1 Tab by mouth every eight (8) hours as needed for Nausea. Qty: 20 Tab, Refills: 0      LORazepam (ATIVAN) 0.5 mg tablet Take 1 Tab by mouth every eight (8) hours as needed for Anxiety. Max Daily Amount: 1.5 mg.  Qty: 3 Tab, Refills: 0         CONTINUE these medications which have NOT CHANGED    Details   cyanocobalamin (VITAMIN B-12) 1,000 mcg tablet Take 1,000 mcg by mouth daily. 2.   Follow-up Information     Follow up With Details Comments [address], [place] Schedule an appointment as soon as possible for a visit  [facility] [address]  [phone]          3. Return to ED if worse       DISCHARGE NOTE:  4:43 PM  Patient's results have been reviewed with them.  Patient and/or family have verbally conveyed their understanding and agreement of the patient's signs, symptoms, diagnosis, treatment and prognosis and additionally agree to follow up as recommended or return to the Emergency Room should their condition change prior to follow-up. Discharge instructions have also been provided to the patient with some educational information regarding their diagnosis as well a list of reasons why they would want to return to the ER prior to their follow-up appointment should their condition change. This note is prepared by Glynn Anderson, acting as Scribe for ALCON Kc M.D: The scribe's documentation has been prepared under my direction and personally reviewed by me in its entirety. I confirm that the note above accurately reflects all work, treatment, procedures, and medical decision making performed by me.

## 2017-06-30 NOTE — ED TRIAGE NOTES
Patient presents to ED with c/o nausea, headaches, dizziness. She was recently switched from Lamictal and gabapentin to cymbalta on Tuesday and neurologist requested that she stop the cymbalta as of Thursday. Pt has hx occipital neuralgia.

## 2017-06-30 NOTE — TELEPHONE ENCOUNTER
----- Message from [facility] sent at 6/30/2017 12:20 PM EDT -----  Regarding: Dr. Cody/Telephone##urgent##    Pt states that she is having a reaction to Cymbalta. Pt states that she has a \"really bad headache\", she is anxious and agitated, having NICOLE eye pain and her pupils are big. Pt can be reached at [phone].

## 2017-06-30 NOTE — ED NOTES
Patient discharged by Dr. Delma Person. Patient provided with discharge instructions Rx and instructions on follow up care. Patient out of ED ambulatory accompanied by family.

## 2017-07-06 ENCOUNTER — PATIENT OUTREACH (OUTPATIENT)
Dept: OTHER | Age: 32
End: 2017-07-06

## 2017-07-06 NOTE — PROGRESS NOTES
Patient on report as eligible for Case Management. Left discreet message on voicemail with this CM contact information. Will attempt to contact again to offer [address] Management services.

## 2017-07-13 ENCOUNTER — PATIENT OUTREACH (OUTPATIENT)
Dept: OTHER | Age: 32
End: 2017-07-13

## 2017-07-13 NOTE — PROGRESS NOTES
Patient identified as eligible for [address] [place] [address] services. Second telephone outreach attempted. Left discreet voicemail with this CM confidential contact information. Will send UTR letter.

## 2017-08-08 ENCOUNTER — PATIENT OUTREACH (OUTPATIENT)
Dept: OTHER | Age: 32
End: 2017-08-08

## 2017-08-08 NOTE — PROGRESS NOTES
Unable to Reach for [address] [employer] CM/VIRGINIE. No responses to my calls or voice mails to return my calls. UTR letter was sent without response. Unable to establish goals with patient. Will complete this episode of transitions of care. No further ED/UC or hospital admissions within 30 days post discharge.

## 2017-12-28 ENCOUNTER — OFFICE VISIT (OUTPATIENT)
Dept: NEUROLOGY | Age: 32
End: 2017-12-28

## 2017-12-28 VITALS
SYSTOLIC BLOOD PRESSURE: 112 MMHG | WEIGHT: 139 LBS | DIASTOLIC BLOOD PRESSURE: 69 MMHG | HEART RATE: 80 BPM | HEIGHT: 66 IN | BODY MASS INDEX: 22.34 KG/M2 | RESPIRATION RATE: 18 BRPM | OXYGEN SATURATION: 100 %

## 2017-12-28 DIAGNOSIS — M54.2 NECK PAIN: ICD-10-CM

## 2017-12-28 DIAGNOSIS — M54.81 OCCIPITAL NEURALGIA OF RIGHT SIDE: ICD-10-CM

## 2017-12-28 DIAGNOSIS — G43.719 INTRACTABLE CHRONIC MIGRAINE WITHOUT AURA AND WITHOUT STATUS MIGRAINOSUS: Primary | ICD-10-CM

## 2017-12-28 RX ORDER — CYCLOBENZAPRINE HCL 5 MG
5 TABLET ORAL
Qty: 30 TAB | Refills: 1 | Status: SHIPPED | OUTPATIENT
Start: 2017-12-28 | End: 2018-03-22 | Stop reason: ALTCHOICE

## 2017-12-28 NOTE — MR AVS SNAPSHOT
Visit Information Date & Time Provider Department Dept. Phone Encounter #  
 12/28/2017 10:00 AM Comfort Hammer MD [facility] at [address] [id number] Follow-up Instructions Return in about 3 months (around 3/28/2018), or if symptoms worsen or fail to improve. Upcoming Health Maintenance Date Due  
 PAP AKA CERVICAL CYTOLOGY 2/10/2017 Influenza Age 5 to Adult 8/1/2017 DTaP/Tdap/Td series (2 - Td) 2/1/2022 Allergies as of 12/28/2017  Review Complete On: 12/28/2017 By: Comfort Hammer MD  
  
 Severity Noted Reaction Type Reactions Latex Medium 11/19/2012    Hives Guaifenesin  11/19/2012    Other (comments) Gained weight Ibuprofen  04/03/2011    Other (comments) \"started burning really bad\" Influenza Virus Vaccine, Specific  02/03/2016    Other (comments) Quadrivalent Severe HA and neurological symptoms Oxycodone  09/22/2014   Side Effect Other (comments) Shellfish Derived  04/07/2014    Rash  
 Sulfa (Sulfonamide Antibiotics)  01/24/2014   Systemic Hives, Other (comments)  
 achy Ldihpvbq-4-uw3 Antimigraine Agents  10/02/2014    Palpitations Chest pain Wheat  11/19/2012    Other (comments) Mouth bleeds Current Immunizations  Reviewed on 3/10/2014 Name Date Hepatitis B Vaccine 8/1/2008, 3/1/2008, 2/1/2008 Influenza Vaccine 10/30/2013 Influenza Vaccine (Quad) PF 9/17/2014 TDAP Vaccine 2/1/2012 Not reviewed this visit You Were Diagnosed With   
  
 Codes Comments Intractable chronic migraine without aura and without status migrainosus    -  Primary ICD-10-CM: M56.057 ICD-9-CM: 346.71 Occipital neuralgia of right side     ICD-10-CM: M54.81 ICD-9-CM: 723.8 Neck pain     ICD-10-CM: M54.2 ICD-9-CM: 723.1 Vitals BP Pulse Resp Height(growth percentile) Weight(growth percentile) SpO2 112/69 (BP 1 Location: Left arm, BP Patient Position: Sitting) 80 18 5' 6\" (1.676 m) 139 lb (63 kg) 100% BMI OB Status Smoking Status 22.44 kg/m2 Having regular periods Never Smoker BMI and BSA Data Body Mass Index Body Surface Area  
 22.44 kg/m 2 1.71 m 2 Preferred Pharmacy Pharmacy Name Phone [facility]ARMACY #[id number]- [place], [address] [phone] Your Updated Medication List  
  
   
This list is accurate as of: 12/28/17 10:49 AM.  Always use your most recent med list.  
  
  
  
  
 cyclobenzaprine 5 mg tablet Commonly known as:  FLEXERIL Take 1 Tab by mouth two (2) times daily as needed for Muscle Spasm(s). gabapentin 300 mg capsule Commonly known as:  NEURONTIN Take 1 Cap by mouth three (3) times daily. lamoTRIgine 25 mg tablet Commonly known as: LaMICtal  
50 mg BID LORazepam 0.5 mg tablet Commonly known as:  ATIVAN Take 1 Tab by mouth every eight (8) hours as needed for Anxiety. Max Daily Amount: 1.5 mg.  
  
 ondansetron hcl 4 mg tablet Commonly known as:  ZOFRAN (AS HYDROCHLORIDE) Take 1 Tab by mouth every eight (8) hours as needed for Nausea. TYLENOL SINUS PO Take  by mouth. VITAMIN B-12 1,000 mcg tablet Generic drug:  cyanocobalamin Take 1,000 mcg by mouth daily. Prescriptions Sent to Pharmacy Refills  
 cyclobenzaprine (FLEXERIL) 5 mg tablet 1 Sig: Take 1 Tab by mouth two (2) times daily as needed for Muscle Spasm(s). Class: Normal  
 Pharmacy: [facility]/pharmacy #[id number]- [address]  #: [phone] Route: Oral  
  
We Performed the Following REFERRAL TO PHYSICAL THERAPY [OGE47 Custom] Follow-up Instructions Return in about 3 months (around 3/28/2018), or if symptoms worsen or fail to improve. Referral Information Referral ID Referred By Referred To  
  
 [id number] [address] Theresa Stewart Phone: [phone] Fax: [phone] Visits Status Start Date End Date 1 New Request 12/28/17 12/28/18 If your referral has a status of pending review or denied, additional information will be sent to support the outcome of this decision. Patient Instructions A Healthy Lifestyle: Care Instructions Your Care Instructions A healthy lifestyle can help you feel good, stay at a healthy weight, and have plenty of energy for both work and play. A healthy lifestyle is something you can share with your whole family. A healthy lifestyle also can lower your risk for serious health problems, such as high blood pressure, heart disease, and diabetes. You can follow a few steps listed below to improve your health and the health of your family. Follow-up care is a key part of your treatment and safety. Be sure to make and go to all appointments, and call your doctor if you are having problems. It's also a good idea to know your test results and keep a list of the medicines you take. How can you care for yourself at home? · Do not eat too much sugar, fat, or fast foods. You can still have dessert and treats now and then. The goal is moderation. · Start small to improve your eating habits. Pay attention to portion sizes, drink less juice and soda pop, and eat more fruits and vegetables. ¨ Eat a healthy amount of food. A 3-ounce serving of meat, for example, is about the size of a deck of cards. Fill the rest of your plate with vegetables and whole grains. ¨ Limit the amount of soda and sports drinks you have every day. Drink more water when you are thirsty. ¨ Eat at least 5 servings of fruits and vegetables every day.  It may seem like a lot, but it is not hard to reach this goal. A serving or helping is 1 piece of fruit, 1 cup of vegetables, or 2 cups of leafy, raw vegetables. Have an apple or some carrot sticks as an afternoon snack instead of a candy bar. Try to have fruits and/or vegetables at every meal. 
· Make exercise part of your daily routine. You may want to start with simple activities, such as walking, bicycling, or slow swimming. Try to be active 30 to 60 minutes every day. You do not need to do all 30 to 60 minutes all at once. For example, you can exercise 3 times a day for 10 or 20 minutes. Moderate exercise is safe for most people, but it is always a good idea to talk to your doctor before starting an exercise program. 
· Keep moving. [place] the lawn, work in the garden, or [employer]. Take the stairs instead of the elevator at work. · If you smoke, quit. People who smoke have an increased risk for heart attack, stroke, cancer, and other lung illnesses. Quitting is hard, but there are ways to boost your chance of quitting tobacco for good. ¨ Use nicotine gum, patches, or lozenges. ¨ Ask your doctor about stop-smoking programs and medicines. ¨ Keep trying. In addition to reducing your risk of diseases in the future, you will notice some benefits soon after you stop using tobacco. If you have shortness of breath or asthma symptoms, they will likely get better within a few weeks after you quit. · Limit how much alcohol you drink. Moderate amounts of alcohol (up to 2 drinks a day for men, 1 drink a day for women) are okay. But drinking too much can lead to liver problems, high blood pressure, and other health problems. Family health If you have a family, there are many things you can do together to improve your health. · Eat meals together as a family as often as possible. · Eat healthy foods. This includes fruits, vegetables, lean meats and dairy, and whole grains. · Include your family in your fitness plan.  Most people think of activities such as jogging or tennis as the way to fitness, but there are many ways you and your family can be more active. Anything that makes you breathe hard and gets your heart pumping is exercise. Here are some tips: 
¨ Walk to do errands or to take your child to school or the bus. ¨ Go for a family bike ride after dinner instead of watching TV. Where can you learn more? Go to http://margaret-nikki.info/. Enter R765 in the search box to learn more about \"A Healthy Lifestyle: Care Instructions. \" Current as of: May 12, 2017 Content Version: 11.4 © 0818-3723 [email or web address]. Care instructions adapted under license by [employer] (which disclaims liability or warranty for this information). If you have questions about a medical condition or this instruction, always ask your healthcare professional. [address] any warranty or liability for your use of this information. Cyclobenzaprine (Flexeril, Amrix, Fexmid, FusePaq Tabradol) - (By mouth) Why this medicine is used:  
Treats pain and stiffness caused by muscle spasms. Contact a nurse or doctor right away if you have: · Anxiety, restlessness, twitching · Seeing or hearing things that are not there · Fast, pounding, or uneven heartbeat · Fever, sweating, nausea, vomiting, diarrhea Common side effects: · Dry mouth · Dizziness, drowsiness © 2017 [address]  Information is for End User's use only and may not be sold, redistributed or otherwise used for commercial purposes. Introducing [facility]! Dear Emmanuel La: Thank you for requesting a Cro[employer]ile [employer] account. Our records indicate that you already have an active Crocodile Gold account. You can access your account anytime at [email or web address] Did you know that you can access your hospital and ER discharge instructions at any time in Crocodile Gold?   You can also review all of your test results from your hospital stay or ER visit. Additional Information If you have questions, please visit the Frequently Asked Questions section of the [employer] website at [email or web address] Remember, [employer] is NOT to be used for urgent needs. For medical emergencies, dial 911. Now available from your iPhone and Android! Please provide this summary of care documentation to your next provider. Your primary care clinician is listed as PROVIDER UNKNOWN. If you have any questions after today's visit, please call [phone].

## 2017-12-28 NOTE — PATIENT INSTRUCTIONS
A Healthy Lifestyle: Care Instructions  Your Care Instructions    A healthy lifestyle can help you feel good, stay at a healthy weight, and have plenty of energy for both work and play. A healthy lifestyle is something you can share with your whole family. A healthy lifestyle also can lower your risk for serious health problems, such as high blood pressure, heart disease, and diabetes. You can follow a few steps listed below to improve your health and the health of your family. Follow-up care is a key part of your treatment and safety. Be sure to make and go to all appointments, and call your doctor if you are having problems. It's also a good idea to know your test results and keep a list of the medicines you take. How can you care for yourself at home? · Do not eat too much sugar, fat, or fast foods. You can still have dessert and treats now and then. The goal is moderation. · Start small to improve your eating habits. Pay attention to portion sizes, drink less juice and soda pop, and eat more fruits and vegetables. ¨ Eat a healthy amount of food. A 3-ounce serving of meat, for example, is about the size of a deck of cards. Fill the rest of your plate with vegetables and whole grains. ¨ Limit the amount of soda and sports drinks you have every day. Drink more water when you are thirsty. ¨ Eat at least 5 servings of fruits and vegetables every day. It may seem like a lot, but it is not hard to reach this goal. A serving or helping is 1 piece of fruit, 1 cup of vegetables, or 2 cups of leafy, raw vegetables. Have an apple or some carrot sticks as an afternoon snack instead of a candy bar. Try to have fruits and/or vegetables at every meal.  · Make exercise part of your daily routine. You may want to start with simple activities, such as walking, bicycling, or slow swimming. Try to be active 30 to 60 minutes every day. You do not need to do all 30 to 60 minutes all at once.  For example, you can exercise 3 times a day for 10 or 20 minutes. Moderate exercise is safe for most people, but it is always a good idea to talk to your doctor before starting an exercise program.  · Keep moving. Shikha Nicholes the lawn, work in the garden, or [employer]. Take the stairs instead of the elevator at work. · If you smoke, quit. People who smoke have an increased risk for heart attack, stroke, cancer, and other lung illnesses. Quitting is hard, but there are ways to boost your chance of quitting tobacco for good. ¨ Use nicotine gum, patches, or lozenges. ¨ Ask your doctor about stop-smoking programs and medicines. ¨ Keep trying. In addition to reducing your risk of diseases in the future, you will notice some benefits soon after you stop using tobacco. If you have shortness of breath or asthma symptoms, they will likely get better within a few weeks after you quit. · Limit how much alcohol you drink. Moderate amounts of alcohol (up to 2 drinks a day for men, 1 drink a day for women) are okay. But drinking too much can lead to liver problems, high blood pressure, and other health problems. Family health  If you have a family, there are many things you can do together to improve your health. · Eat meals together as a family as often as possible. · Eat healthy foods. This includes fruits, vegetables, lean meats and dairy, and whole grains. · Include your family in your fitness plan. Most people think of activities such as jogging or tennis as the way to fitness, but there are many ways you and your family can be more active. Anything that makes you breathe hard and gets your heart pumping is exercise. Here are some tips:  ¨ Walk to do errands or to take your child to school or the bus. ¨ Go for a family bike ride after dinner instead of watching TV. Where can you learn more? Go to http://margaret-nikki.info/. Enter H140 in the search box to learn more about \"A Healthy Lifestyle: Care Instructions. \"  Current as of: May 12, 2017  Content Version: 11.4  © 2548-6980 [employer]. Care instructions adapted under license by [employer] (which disclaims liability or warranty for this information). If you have questions about a medical condition or this instruction, always ask your healthcare professional. [address]  any warranty or liability for your use of this information. Cyclobenzaprine (Flexeril, Amrix, Fexmid, FusePaq Tabradol) - (By mouth)   Why this medicine is used:   Treats pain and stiffness caused by muscle spasms. Contact a nurse or doctor right away if you have:  · Anxiety, restlessness, twitching  · Seeing or hearing things that are not there  · Fast, pounding, or uneven heartbeat  · Fever, sweating, nausea, vomiting, diarrhea     Common side effects:  · Dry mouth  · Dizziness, drowsiness  © 2017 [address] [place] Information is for End User's use only and may not be sold, redistributed or otherwise used for commercial purposes.

## 2017-12-28 NOTE — PROGRESS NOTES
Date:  17     Name:  Michele Matthews  :  1985  MRN:  [id number]     PCP:  PROVIDER UNKNOWN    Chief Complaint   Patient presents with    Headache       HISTORY OF PRESENT ILLNESS:  30 yo female, patient of Dr Zahira Holley with right sided occipital neuralgia and accompanying migraines returns for follow-up. She was referred to PT in  when she was here to see Dr Zahira Holley. She states that at PT she was getting dry needling(once a week) as well as manual manipulation(once a week) for 3 months. This helped her immensely and then the frequency of these were changed to once every 3 weeks in October. She still continued to do good. 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 She feels helping move that patient brought her symptoms back. A week later she started to have kidney stone pain, which further worsened her headaches and neck pain. At present she is not on any preventive medication as they were either not effective or not well-tolerated in the past. These have included Lamictal, Gabapentin and Cymbalta. Patient used to get relief from using Imitrex 50 mg as headache abortive therpay in the past but had some feeling of chest tightness last time she used it and has not taken any triptan since      Except as noted above, denies  fever, chills, cough. No CP or SOB. No loss of bowel or bladder control. No Weight loss. Appetite good. Sleeping well. No sweats. No edema. No bruising or bleeding. No diarrhea. No frequency, urgency, No depressive sxs. Denies sore throat, nasal congestion, nasal discharge, epistaxis, tinnitus, hearing loss, joint pain. Current Outpatient Prescriptions on File Prior to Visit   Medication Sig Dispense Refill    PSEUDOEPHEDRINE/ACETAMINOPHEN (TYLENOL SINUS PO) Take  by mouth.  ondansetron hcl (ZOFRAN, AS HYDROCHLORIDE,) 4 mg tablet Take 1 Tab by mouth every eight (8) hours as needed for Nausea.  20 Tab 0    LORazepam (ATIVAN) 0.5 mg tablet Take 1 Tab by mouth every eight (8) hours as needed for Anxiety. Max Daily Amount: 1.5 mg. 3 Tab 0    lamoTRIgine (LAMICTAL) 25 mg tablet 50 mg BID 60 Tab 3    gabapentin (NEURONTIN) 300 mg capsule Take 1 Cap by mouth three (3) times daily. 90 Cap 5    cyanocobalamin (VITAMIN B-12) 1,000 mcg tablet Take 1,000 mcg by mouth daily. No current facility-administered medications on file prior to visit.         Allergies   Allergen Reactions    Latex Hives    Guaifenesin Other (comments)     Gained weight    Ibuprofen Other (comments)     \"started burning really bad\"    Influenza Virus Vaccine, Specific Other (comments)     Quadrivalent   Severe HA and neurological symptoms     Oxycodone Other (comments)    Shellfish Derived Rash    Sulfa (Sulfonamide Antibiotics) Hives and Other (comments)     achy    Utwofnqh-2-Ht1 Antimigraine Agents Palpitations     Chest pain    Wheat Other (comments)     Mouth bleeds     Past Medical History:   Diagnosis Date    Abdominal pain, other specified site     Calculus of kidney     RIGHT    [facility]algia 06/2016    [facility]    GERD (gastroesophageal reflux disease)     Hydronephrosis     Hypoglycemia, unspecified     Irregular menstrual cycle     Lumbago     Lupus 06/2016    [facility]    Migraine with aura, without mention of intractable migraine without mention of status migrainosus     Other ill-defined conditions(799.89)     mitral valve prolapse    Sacroiliitis, not elsewhere classified ([facility] [address]      Past Surgical History:   Procedure Laterality Date    HX ADENOIDECTOMY      HX TONSILLECTOMY      MS COLONOSCOPY FLX DX W/COLLJ SPEC WHEN PFRMD  4/7/2014         MS EGD TRANSORAL BIOPSY SINGLE/MULTIPLE  4/7/2014          Social History     Social History    Marital status: SINGLE     Spouse name: N/A    Number of children: 0    Years of education: N/A     Occupational History   [employer] Social History Main Topics    Smoking status: Never Smoker    Smokeless tobacco: Never Used    Alcohol use No    Drug use: No    Sexual activity: No     Other Topics Concern    Not on file     Social History Narrative     Family History   Problem Relation Age of Onset    Cancer Mother     Diabetes Mother     Arthritis-osteo Mother     Thyroid Disease Mother    [facility] Migraines Mother     Seizures Mother     Diabetes Maternal Grandmother     Rashes/Skin Problems Maternal Grandmother     Diabetes Maternal Grandfather     Heart Disease Maternal Grandfather     Heart Attack Maternal Grandfather     Hypertension Maternal Grandfather     High Cholesterol Maternal Grandfather     Stroke Maternal Grandfather        PHYSICAL EXAMINATION:    Visit Vitals    /69 (BP 1 Location: Left arm, BP Patient Position: Sitting)    Pulse 80    Resp 18    Ht 5' 6\" (1.676 m)    Wt 63 kg (139 lb)    SpO2 100%    BMI 22.44 kg/m2     General:  Well defined, nourished, and groomed individual in no acute distress. Neck: Supple, nontender, no bruits, no pain with resistance to active range of motion. Heart: Regular rate and rhythm, no murmurs, rub, or gallop. Normal S1S2. Musculoskeletal:  Extremities revealed no edema and had full range of motion of joints. NEUROLOGICAL EXAMINATION:     Mental Status:   Alert and oriented to person, place, and time with recent and remote memory intact. Attention span and concentration are normal. Speech is fluent with a full fund of knowledge. Cranial Nerves:    II, III, IV, VI:  Visual acuity grossly intact. Visual fields are normal.    Pupils are equal, round, and reactive to light and accommodation. Extra-ocular movements are full and fluid. Fundoscopic exam attempted, no ptosis or nystagmus. V-XII: Hearing is grossly intact. Facial features are symmetric, with normal sensation and strength.   The palate rises symmetrically and the tongue protrudes midline. Sternocleidomastoids 5/5. Motor Examination: Normal tone, bulk, and strength, 5/5 muscle strength throughout. No cogwheel rigidity or clonus present. Sensory exam:  Normal throughout to pinprick, temperature, and vibration sense. Normal proprioception. Coordination:  Heel-to-shin was smooth and symmetrical bilaterally. Finger to nose and rapid arm movement testing was normal.   No resting or intention tremor    Gait and Station:  Steady while walking. Normal arm swing. No Rhomberg or pronator drift. No muscle wasting or fasiculations noted. Reflexes:  DTRs 2+ throughout. ASSESSMENT AND PLAN    ICD-10-CM ICD-9-CM    1. Intractable chronic migraine without aura and without status migrainosus G43.719 346.71 cyclobenzaprine (FLEXERIL) 5 mg tablet   2. Occipital neuralgia of right side M54.81 723.8 cyclobenzaprine (FLEXERIL) 5 mg tablet   3. Neck pain M54.2 723.1 REFERRAL TO PHYSICAL THERAPY       Headaches and neck pain were very well controlled with her dry needling and manual manipulation treatments through PT(Ms Misha Bach). Some events/health conditions as stated in HPI may have acted as triggers in flaring her symptoms up again. She already has an appointment with her PT today and is requiring a script/renewal which I did. For short-term abortive therapy we discussed flexeril 5mg BID prn including possible side effects like drowsiness, dizziness and I provided her written material on this. She makes an informed choice of trying flexeril.     She will RTO with Dr Ephraim Frye in 3 months or sooner prn    Nader Worley MD

## 2018-01-30 ENCOUNTER — TELEPHONE (OUTPATIENT)
Dept: NEUROLOGY | Age: 33
End: 2018-01-30

## 2018-01-30 DIAGNOSIS — G43.709 CHRONIC MIGRAINE WITHOUT AURA WITHOUT STATUS MIGRAINOSUS, NOT INTRACTABLE: Primary | ICD-10-CM

## 2018-01-30 NOTE — TELEPHONE ENCOUNTER
----- Message from Anabell Bennett sent at 1/30/2018 11:40 AM EST -----  Regarding: Dr. Cassandra Moore Refill  The pt has an appt for dry needling tomorrow at 2pm with the physical therapist. The office needs another referral for that to take place. Orthopedic Physical [address] [facility] [address], [facility], [address]  Dr. Teri Macario   [phone]; NO fax number.        Pt's best contact number is  [phone]

## 2018-03-22 ENCOUNTER — OFFICE VISIT (OUTPATIENT)
Dept: NEUROLOGY | Age: 33
End: 2018-03-22

## 2018-03-22 VITALS
RESPIRATION RATE: 14 BRPM | SYSTOLIC BLOOD PRESSURE: 118 MMHG | HEIGHT: 66 IN | WEIGHT: 138.8 LBS | BODY MASS INDEX: 22.31 KG/M2 | DIASTOLIC BLOOD PRESSURE: 60 MMHG | OXYGEN SATURATION: 98 % | HEART RATE: 81 BPM

## 2018-03-22 DIAGNOSIS — G43.719 INTRACTABLE CHRONIC MIGRAINE WITHOUT AURA AND WITHOUT STATUS MIGRAINOSUS: Primary | ICD-10-CM

## 2018-03-22 RX ORDER — RIZATRIPTAN BENZOATE 10 MG/1
10 TABLET, ORALLY DISINTEGRATING ORAL
Qty: 9 TAB | Refills: 1 | Status: SHIPPED | OUTPATIENT
Start: 2018-03-22 | End: 2018-11-14

## 2018-03-22 RX ORDER — BISMUTH SUBSALICYLATE 262 MG
1 TABLET,CHEWABLE ORAL DAILY
COMMUNITY
End: 2018-11-14

## 2018-03-22 RX ORDER — NORTRIPTYLINE HYDROCHLORIDE 10 MG/1
10 CAPSULE ORAL
Qty: 30 CAP | Refills: 1 | Status: SHIPPED | OUTPATIENT
Start: 2018-03-22 | End: 2018-11-14

## 2018-03-22 RX ORDER — CHOLECALCIFEROL (VITAMIN D3) 125 MCG
5000 CAPSULE ORAL
COMMUNITY
End: 2018-11-14

## 2018-03-22 NOTE — PROGRESS NOTES
Chief Complaint   Patient presents with    Headache         HISTORY OF PRESENT ILLNESS  Silvina Acosta came back for a follow up. She has been seen in the past for migraine-like headaches and occipital neuralgia for which she had tried occipital nerve block and different medications including gabapentin, Cymbalta and lamotrigine. She does not get much occipital neuralgia anymore but recently she has been having migrainous headaches. These involve mainly one-sided, sharp, throbbing pain, associated with light and sound sensitivity. 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 Her frequency is about 1-2 headaches per week. She tries to take Excedrin or Tylenol which may or may not work. She tried sumatriptan once but experienced some palpitations. She was having sensitivity to several different other medications during that time. She is not taking any prophylactics at this time. She was prescribed Flexeril at the last visit but it was making her very tired. She is open to trying a different prophylactic. She has recently changed her diet significantly and has cut out dairy, meats and artificial sweeteners. She has been told that she has fibromyalgia and possibly lupus    Current Outpatient Prescriptions   Medication Sig    multivitamin (ONE A DAY) tablet Take 1 Tab by mouth daily.  cholecalciferol, vitamin D3, (VITAMIN D3) 2,000 unit tab Take 5,000 Units by mouth.  nortriptyline (PAMELOR) 10 mg capsule Take 1 Cap by mouth nightly.  rizatriptan (MAXALT-MLT) 10 mg disintegrating tablet Take 1 Tab by mouth once as needed for Migraine for up to 1 dose.  cyanocobalamin (VITAMIN B-12) 1,000 mcg tablet Take 1,000 mcg by mouth daily. No current facility-administered medications for this visit.         PHYSICAL EXAMINATION:    Visit Vitals    /60    Pulse 81    Resp 14    Ht 5' 6\" (1.676 m)    Wt 63 kg (138 lb 12.8 oz)    SpO2 98%    BMI 22.4 kg/m2       NEUROLOGICAL EXAMINATION:     Mental Status:   Alert and oriented to person, place, and time with recent and remote memory intact. Attention span and concentration are normal. Speech is fluent with a full fund of knowledge. Cranial Nerves:    II, III, IV, VI:  Visual acuity grossly intact. Visual fields are normal.    Pupils are equal, round, and reactive to light and accommodation. Extra-ocular movements are full and fluid. V-XII: Hearing is grossly intact. Facial features are symmetric, with normal sensation and strength. The palate rises symmetrically and the tongue protrudes midline. Sternocleidomastoids 5/5. Motor Examination: Normal tone, bulk, and strength. 5/5 muscle strength throughout. No cogwheel rigidity or clonus present. Sensory exam:  Normal throughout to pinprick, temperature, and vibration sense. Normal proprioception. Coordination: Finger to nose and rapid arm movement testing was normal.   No resting or intention tremor    Gait and Station:  Steady while walking on toes, heels, and with tandem walking. Normal arm swing. No Rhomberg or pronator drift. No muscle wasting or fasiculations noted. Reflexes:  DTRs 2+ throughout. Toes downgoing. LABS / IMAGING  MRI of the brain with and without contrast was normal.    ASSESSMENT    ICD-10-CM ICD-9-CM    1. Intractable chronic migraine without aura and without status migrainosus G43.719 346.71 nortriptyline (PAMELOR) 10 mg capsule      rizatriptan (MAXALT-MLT) 10 mg disintegrating tablet       DISCUSSION  Ms. Ramesh Resendiz has been having more migraine-like headaches recently . I have recommended retrying a different triptan i.e. rizatriptan for abortive therapy and start nortriptyline 10 mg at bedtime for prophylaxis. May increase to 20 mg after 1 week.   Continue periodic follow-up      Meron Schwab MD  Diplomate, American Board of Psychiatry & Neurology (Neurology)  Diplomate, American Board of Psychiatry & Neurology (Clinical Neurophysiology)  Diplomate, American Board of Electrodiagnostic Medicine

## 2018-03-22 NOTE — PATIENT INSTRUCTIONS

## 2018-03-22 NOTE — MR AVS SNAPSHOT
[facility] [address] [place] [address] 
[phone] Patient: Vinnie Jaimes MRN: [id number]:[id number] Visit Information Date & Time Provider Department Dept. Phone Encounter #  
 3/22/2018 11:40 AM Sanjuanita Frias MD [facility] at [address] [id number] Follow-up Instructions Return in about 3 months (around 6/22/2018). Upcoming Health Maintenance Date Due  
 PAP AKA CERVICAL CYTOLOGY 2/10/2017 Influenza Age 5 to Adult 8/1/2017 DTaP/Tdap/Td series (2 - Td) 2/1/2022 Allergies as of 3/22/2018  Review Complete On: 3/22/2018 By: Sanjuanita Frias MD  
  
 Severity Noted Reaction Type Reactions Latex Medium 11/19/2012    Hives Guaifenesin  11/19/2012    Other (comments) Gained weight Ibuprofen  04/03/2011    Other (comments) \"started burning really bad\" Influenza Virus Vaccine, Specific  02/03/2016    Other (comments) Quadrivalent Severe HA and neurological symptoms Oxycodone  09/22/2014   Side Effect Other (comments) Shellfish Derived  04/07/2014    Rash  
 Sulfa (Sulfonamide Antibiotics)  01/24/2014   Systemic Hives, Other (comments)  
 achy [id number]-6-[id number] Antimigraine Agents  10/02/2014    Palpitations Chest pain Wheat  11/19/2012    Other (comments) Mouth bleeds Current Immunizations  Reviewed on 3/10/2014 Name Date Hepatitis B Vaccine 8/1/2008, 3/1/2008, 2/1/2008 Influenza Vaccine 10/30/2013 Influenza Vaccine (Quad) PF 9/17/2014 TDAP Vaccine 2/1/2012 Not reviewed this visit You Were Diagnosed With   
  
 Codes Comments Intractable chronic migraine without aura and without status migrainosus    -  Primary ICD-10-CM: G33.417 ICD-9-CM: 346.71 Vitals BP Pulse Resp Height(growth percentile) Weight(growth percentile) SpO2  
 118/60 81 14 5' 6\" (1.676 m) 138 lb 12.8 oz (63 kg) 98% BMI OB Status Smoking Status 22.4 kg/m2 Having regular periods Never Smoker Vitals History BMI and BSA Data Body Mass Index Body Surface Area  
 22.4 kg/m 2 1.71 m 2 Preferred Pharmacy Pharmacy Name Phone [facility]/PHARMACY #[id number]- [address] [phone] Your Updated Medication List  
  
   
This list is accurate as of 3/22/18 12:14 PM.  Always use your most recent med list.  
  
  
  
  
 multivitamin tablet Commonly known as:  ONE A DAY Take 1 Tab by mouth daily. nortriptyline 10 mg capsule Commonly known as:  PAMELOR Take 1 Cap by mouth nightly. rizatriptan 10 mg disintegrating tablet Commonly known as:  MAXALT-MLT Take 1 Tab by mouth once as needed for Migraine for up to 1 dose. VITAMIN B-12 1,000 mcg tablet Generic drug:  cyanocobalamin Take 1,000 mcg by mouth daily. VITAMIN D3 2,000 unit Tab Generic drug:  cholecalciferol (vitamin D3) Take 5,000 Units by mouth. Prescriptions Sent to Pharmacy Refills  
 nortriptyline (PAMELOR) 10 mg capsule 1 Sig: Take 1 Cap by mouth nightly. Class: Normal  
 Pharmacy: [facility]/pharmacy #[id number]- [address] Ph #: [phone] Route: Oral  
 rizatriptan (MAXALT-MLT) 10 mg disintegrating tablet 1 Sig: Take 1 Tab by mouth once as needed for Migraine for up to 1 dose. Class: Normal  
 Pharmacy: [facility]/pharmacy #[id number]- [address] Ph #: [phone] Route: Oral  
  
Follow-up Instructions Return in about 3 months (around 6/22/2018). Patient Instructions A Healthy Lifestyle: Care Instructions Your Care Instructions A healthy lifestyle can help you feel good, stay at a healthy weight, and have plenty of energy for both work and play.  A healthy lifestyle is something you can share with your whole family. A healthy lifestyle also can lower your risk for serious health problems, such as high blood pressure, heart disease, and diabetes. You can follow a few steps listed below to improve your health and the health of your family. Follow-up care is a key part of your treatment and safety. Be sure to make and go to all appointments, and call your doctor if you are having problems. It's also a good idea to know your test results and keep a list of the medicines you take. How can you care for yourself at home? · Do not eat too much sugar, fat, or fast foods. You can still have dessert and treats now and then. The goal is moderation. · Start small to improve your eating habits. Pay attention to portion sizes, drink less juice and soda pop, and eat more fruits and vegetables. ¨ Eat a healthy amount of food. A 3-ounce serving of meat, for example, is about the size of a deck of cards. Fill the rest of your plate with vegetables and whole grains. ¨ Limit the amount of soda and sports drinks you have every day. Drink more water when you are thirsty. ¨ Eat at least 5 servings of fruits and vegetables every day. It may seem like a lot, but it is not hard to reach this goal. A serving or helping is 1 piece of fruit, 1 cup of vegetables, or 2 cups of leafy, raw vegetables. Have an apple or some carrot sticks as an afternoon snack instead of a candy bar. Try to have fruits and/or vegetables at every meal. 
· Make exercise part of your daily routine. You may want to start with simple activities, such as walking, bicycling, or slow swimming. Try to be active 30 to 60 minutes every day. You do not need to do all 30 to 60 minutes all at once. For example, you can exercise 3 times a day for 10 or 20 minutes.  Moderate exercise is safe for most people, but it is always a good idea to talk to your doctor before starting an exercise program. 
 · Keep moving. Driss Noonan the lawn, work in the garden, or [employer]. Take the stairs instead of the elevator at work. · If you smoke, quit. People who smoke have an increased risk for heart attack, stroke, cancer, and other lung illnesses. Quitting is hard, but there are ways to boost your chance of quitting tobacco for good. ¨ Use nicotine gum, patches, or lozenges. ¨ Ask your doctor about stop-smoking programs and medicines. ¨ Keep trying. In addition to reducing your risk of diseases in the future, you will notice some benefits soon after you stop using tobacco. If you have shortness of breath or asthma symptoms, they will likely get better within a few weeks after you quit. · Limit how much alcohol you drink. Moderate amounts of alcohol (up to 2 drinks a day for men, 1 drink a day for women) are okay. But drinking too much can lead to liver problems, high blood pressure, and other health problems. Family health If you have a family, there are many things you can do together to improve your health. · Eat meals together as a family as often as possible. · Eat healthy foods. This includes fruits, vegetables, lean meats and dairy, and whole grains. · Include your family in your fitness plan. Most people think of activities such as jogging or tennis as the way to fitness, but there are many ways you and your family can be more active. Anything that makes you breathe hard and gets your heart pumping is exercise. Here are some tips: 
¨ Walk to do errands or to take your child to school or the bus. ¨ Go for a family bike ride after dinner instead of watching TV. Where can you learn more? Go to http://margaret-nikki.info/. Enter A740 in the search box to learn more about \"A Healthy Lifestyle: Care Instructions. \" Current as of: May 12, 2017 Content Version: 11.4 © 5683-7011 Healthwise, Incorporated.  Care instructions adapted under license by [address] (which disclaims liability or warranty for this information). If you have questions about a medical condition or this instruction, always ask your healthcare professional. [address] any warranty or liability for your use of this information. Introducing [facility]! Dear Hua Benito: Thank you for requesting a Sequella account. Our records indicate that you already have an active Sequella account. You can access your account anytime at [email or web address]. [email or web address] nano/Video [email or web address] Did you know that you can access your hospital and ER discharge instructions at any time in Sequella? You can also review all of your test results from your hospital stay or ER visit. Additional Information If you have questions, please visit the Frequently Asked Questions section of the Sequella website at [email or web address] Remember, Sequella is NOT to be used for urgent needs. For medical emergencies, dial 911. Now available from your iPhone and Android! Please provide this summary of care documentation to your next provider. Your primary care clinician is listed as PROVIDER UNKNOWN. If you have any questions after today's visit, please call [phone].

## 2018-08-02 ENCOUNTER — TELEPHONE (OUTPATIENT)
Dept: NEUROLOGY | Age: 33
End: 2018-08-02

## 2018-08-02 DIAGNOSIS — M54.2 NECK PAIN: Primary | ICD-10-CM

## 2018-08-02 DIAGNOSIS — R51.9 NONINTRACTABLE HEADACHE, UNSPECIFIED CHRONICITY PATTERN, UNSPECIFIED HEADACHE TYPE: ICD-10-CM

## 2018-08-02 NOTE — TELEPHONE ENCOUNTER
----- Message from Jeramie Ching sent at 8/2/2018  9:14 AM EDT -----  Regarding: /Telephone  Che Sanchez called from [facility] requesting a updated script for the pt for schedule appt for tomorrow 08/03/2018. Best contact number is [phone].

## 2018-11-06 ENCOUNTER — OFFICE VISIT (OUTPATIENT)
Dept: INTERNAL MEDICINE CLINIC | Age: 33
End: 2018-11-06

## 2018-11-06 VITALS
DIASTOLIC BLOOD PRESSURE: 63 MMHG | BODY MASS INDEX: 23.49 KG/M2 | HEIGHT: 65 IN | RESPIRATION RATE: 16 BRPM | HEART RATE: 90 BPM | TEMPERATURE: 98.7 F | OXYGEN SATURATION: 97 % | WEIGHT: 141 LBS | SYSTOLIC BLOOD PRESSURE: 99 MMHG

## 2018-11-06 DIAGNOSIS — Z00.00 ROUTINE ADULT HEALTH MAINTENANCE: Primary | ICD-10-CM

## 2018-11-06 DIAGNOSIS — K90.0 CELIAC DISEASE: ICD-10-CM

## 2018-11-06 DIAGNOSIS — M79.7 FIBROMYALGIA SYNDROME: ICD-10-CM

## 2018-11-06 DIAGNOSIS — G43.719 INTRACTABLE CHRONIC MIGRAINE WITHOUT AURA AND WITHOUT STATUS MIGRAINOSUS: ICD-10-CM

## 2018-11-06 RX ORDER — EPINEPHRINE 0.3 MG/.3ML
0.3 INJECTION SUBCUTANEOUS
Qty: 2 SYRINGE | Refills: 1 | Status: SHIPPED | OUTPATIENT
Start: 2018-11-06 | End: 2018-11-06

## 2018-11-06 RX ORDER — BUTALBITAL, ACETAMINOPHEN AND CAFFEINE 50; 325; 40 MG/1; MG/1; MG/1
1 TABLET ORAL
Qty: 30 TAB | Refills: 1 | Status: SHIPPED | OUTPATIENT
Start: 2018-11-06 | End: 2019-04-30 | Stop reason: SDUPTHER

## 2018-11-06 NOTE — PROGRESS NOTES
Reviewed record in preparation for visit and have obtained necessary documentation. Identified pt with two pt identifiers(name and ). Chief Complaint Patient presents with [address] [place] [address] Health Maintenance Due Topic Date Due  
 PAP AKA CERVICAL CYTOLOGY  02/10/2017  Influenza Age 5 to Adult  2018 Ms. Acosta has a reminder for a \"due or due soon\" health maintenance. I have asked that she discuss health maintenance topic(s) due with Her  primary care provider. Coordination of Care Questionnaire: 
:  
 
1) Have you been to an emergency room, urgent care clinic since your last visit? no  
Hospitalized since your last visit? no          
 
2) Have you seen or consulted any other health care providers outside of [address] [place] [address] since your last visit? no  (Include any pap smears or colon screenings in this section.) 3) Do you have an Advance Directive on file? no 
 
4) Are you interested in receiving information on Advance Directives? YES Patient is accompanied by self I have received verbal consent from Soledad Johnson to discuss any/all medical information while they are present in the room.

## 2018-11-06 NOTE — PATIENT INSTRUCTIONS
Migraine Headache: Care Instructions Your Care Instructions Migraines are painful, throbbing headaches that often start on one side of the head. They may cause nausea and vomiting and make you sensitive to light, sound, or smell. Without treatment, migraines can last from 4 hours to a few days. Medicines can help prevent migraines or stop them after they have started. Your doctor can help you find which ones work best for you. Follow-up care is a key part of your treatment and safety. Be sure to make and go to all appointments, and call your doctor if you are having problems. It's also a good idea to know your test results and keep a list of the medicines you take. How can you care for yourself at home? · Do not drive if you have taken a prescription pain medicine. · Rest in a quiet, dark room until your headache is gone. Close your eyes, and try to relax or go to sleep. Don't watch TV or read. · Put a cold, moist cloth or cold pack on the painful area for 10 to 20 minutes at a time. Put a thin cloth between the cold pack and your skin. · Use a warm, moist towel or a heating pad set on low to relax tight shoulder and neck muscles. · Have someone gently massage your neck and shoulders. · Take your medicines exactly as prescribed. Call your doctor if you think you are having a problem with your medicine. You will get more details on the specific medicines your doctor prescribes. · Be careful not to take pain medicine more often than the instructions allow. You could get worse or more frequent headaches when the medicine wears off. To prevent migraines · Keep a headache diary so you can figure out what triggers your headaches. Avoiding triggers may help you prevent headaches. Record when each headache began, how long it lasted, and what the pain was like.  (Was it throbbing, aching, stabbing, or dull?) Write down any other symptoms you had with the headache, such as nausea, flashing lights or dark spots, or sensitivity to bright light or loud noise. Note if the headache occurred near your period. List anything that might have triggered the headache. Triggers may include certain foods (chocolate, cheese, wine) or odors, smoke, bright light, stress, or lack of sleep. · If your doctor has prescribed medicine for your migraines, take it as directed. You may have medicine that you take only when you get a migraine and medicine that you take all the time to help prevent migraines. ? If your doctor has prescribed medicine for when you get a headache, take it at the first sign of a migraine, unless your doctor has given you other instructions. ? If your doctor has prescribed medicine to prevent migraines, take it exactly as prescribed. Call your doctor if you think you are having a problem with your medicine. · Find healthy ways to deal with stress. Migraines are most common during or right after stressful times. Take time to relax before and after you do something that has caused a migraine in the past. 
· Try to keep your muscles relaxed by keeping good posture. Check your jaw, face, neck, and shoulder muscles for tension. Try to relax them. When you sit at a desk, change positions often. And make sure to stretch for 30 seconds each hour. · Get plenty of sleep and exercise. · Eat meals on a regular schedule. Avoid foods and drinks that often trigger migraines. These include chocolate, alcohol (especially red wine and port), aspartame, monosodium glutamate (MSG), and some additives found in foods (such as hot dogs, honeycutt, cold cuts, aged cheeses, and pickled foods). · Limit caffeine. Don't drink too much coffee, tea, or soda. But don't quit caffeine suddenly. That can also give you migraines. · Do not smoke or allow others to smoke around you. If you need help quitting, talk to your doctor about stop-smoking programs and medicines. These can increase your chances of quitting for good. · If you are taking birth control pills or hormone therapy, talk to your doctor about whether they are triggering your migraines. When should you call for help? Call 911 anytime you think you may need emergency care. For example, call if: 
  · You have signs of a stroke. These may include: 
? Sudden numbness, paralysis, or weakness in your face, arm, or leg, especially on only one side of your body. ? Sudden vision changes. ? Sudden trouble speaking. ? Sudden confusion or trouble understanding simple statements. ? Sudden problems with walking or balance. ? A sudden, severe headache that is different from past headaches.  
 Call your doctor now or seek immediate medical care if: 
  · You have new or worse nausea and vomiting.  
  · You have a new or higher fever.  
  · Your headache gets much worse.  
 Watch closely for changes in your health, and be sure to contact your doctor if: 
  · You are not getting better after 2 days (48 hours). Where can you learn more? Go to http://margaret-nikki.info/. Enter O556 in the search box to learn more about \"Migraine Headache: Care Instructions. \" Current as of: June 4, 2018 Content Version: 11.8 © 6277-7305 Healthwise, Incorporated. Care instructions adapted under license by [employer] (which disclaims liability or warranty for this information). If you have questions about a medical condition or this instruction, always ask your healthcare professional. [place] [address] any warranty or liability for your use of this information. Come back for fasting labs, lab opens 8 am, mon-fri. No appt needed.

## 2018-11-06 NOTE — PROGRESS NOTES
Lenn Merlin is a 34 y.o. female who presents for evaluation of cpe, new pt visit. 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 Has followed with neurology for past 4 years, suffers with migraines now ever since had a flu shot 4 years ago. Also saw rheum, dr Benjamin Adair, and was told had sle and fibromyalgia. Typically gets migraines every other week or so, and they tend to last 2-3 days. Had been on pamelor, neurontin, and cymbalta in past, but did not like side effects. ROS: 
Constitutional: negative for fevers, chills, anorexia and weight loss Eyes:   negative for visual disturbance and irritation ENT:   negative for tinnitus,sore throat,nasal congestion,ear pain,hoarseness Respiratory:  negative for cough, hemoptysis, dyspnea,wheezing CV:   negative for chest pain, palpitations, lower extremity edema GI:   negative for nausea, vomiting, diarrhea, abdominal pain,melena Genitourinary: negative for frequency, dysuria and hematuria Musculoskel: negative for myalgias, arthralgias, back pain, muscle weakness, joint pain Neurological:  negative for headaches, dizziness, focal weakness, numbness Psychiatric:     Negative for depression or anxiety Past Medical History:  
Diagnosis Date  Abdominal pain, other specified site  Calculus of kidney RIGHT  Fibromyalgia 06/2016 [facility]  GERD (gastroesophageal reflux disease)  Hydronephrosis  Hypoglycemia, unspecified  Irregular menstrual cycle  Lumbago  Lupus 06/2016 [facility]  Migraine with aura, without mention of intractable migraine without mention of status migrainosus  Other ill-defined conditions(799.89)   
 mitral valve prolapse  Sacroiliitis, not elsewhere classified ([facility] [address] Past Surgical History:  
Procedure Laterality Date  HX ADENOIDECTOMY  HX TONSILLECTOMY  [place] COLONOSCOPY FLX DX W/COLLJ SPEC WHEN PFRMD  4/7/2014  [place] EGD TRANSORAL BIOPSY SINGLE/MULTIPLE  4/7/2014 Family History Problem Relation Age of Onset  Cancer Mother  Diabetes Mother [facility] Arthritis-osteo Mother  Thyroid Disease Mother  Migraines Mother  Seizures Mother  Diabetes Maternal Grandmother  Rashes/Skin Problems Maternal Grandmother  Diabetes Maternal Grandfather  Heart Disease Maternal Grandfather  Heart Attack Maternal Grandfather  Hypertension Maternal Grandfather  High Cholesterol Maternal Grandfather  Stroke Maternal Grandfather Social History Socioeconomic History  Marital status: SINGLE Spouse name: Not on file  Number of children: 0  
 Years of education: Not on file  Highest education level: Not on file Social Needs  Financial resource strain: Not on file  Food insecurity - worry: Not on file  Food insecurity - inability: Not on file  Transportation needs - medical: Not on file  Transportation needs - non-medical: Not on file Occupational History Employer: [employer] Tobacco Use  Smoking status: Never Smoker  Smokeless tobacco: Never Used Substance and Sexual Activity  Alcohol use: No  
 Drug use: No  
 Sexual activity: Yes  
  Partners: Male Birth control/protection: Condom Other Topics Concern  Not on file Social History Narrative  Not on file Visit Vitals BP 99/63 (BP 1 Location: Left arm, BP Patient Position: Sitting) Pulse 90 Temp 98.7 °F (37.1 °C) (Oral) Resp 16 Ht 5' 5\" (1.651 m) Wt 141 lb (64 kg) LMP 10/19/2018 SpO2 97% BMI 23.46 kg/m² Physical Examination:  
General - Well appearing female HEENT - PERRL, TM no erythema/opacification, normal nasal turbinates, no oropharyngeal erythema or exudate, MMM Neck - supple, no bruits, no thyroidomegaly, no lymphadenopathy Pulm - clear to auscultation bilaterally Cardio - RRR, normal S1 S2, no murmur Abd - soft, nontender, no masses, no HSM Extrem - no edema, +2 distal pulses Neuro-  No focal deficits, CN intact Assessment/Plan: 1. Routine adult health maintenance--check cbc, cmp, flp, tsh, hiv, a1c 
2. Migraines--rx given for fioricet, continue to follow with neuro 3. Celiac ds--check gluten labs 4.  ? Hx sle--check kyler panel, apparently saw dr Rowan Urbano, will get her records. 5.  Fibromyalgia--supportive care for now 6. Severe food allergies--check allergy profile, referral to dr Jordon Mina, allergist 
 
Declines flu shot due to prior reaction. rx given for epi pen. Had pap with dr Roque Courtney 
 
rtc 6 months Stewart Dancer III, DO

## 2018-11-07 ENCOUNTER — APPOINTMENT (OUTPATIENT)
Dept: INTERNAL MEDICINE CLINIC | Age: 33
End: 2018-11-07

## 2018-11-09 ENCOUNTER — HOSPITAL ENCOUNTER (EMERGENCY)
Age: 33
Discharge: HOME | End: 2018-11-09
Attending: EMERGENCY MEDICINE
Payer: COMMERCIAL

## 2018-11-09 VITALS
DIASTOLIC BLOOD PRESSURE: 75 MMHG | RESPIRATION RATE: 18 BRPM | BODY MASS INDEX: 23.73 KG/M2 | TEMPERATURE: 99 F | HEIGHT: 65 IN | OXYGEN SATURATION: 99 % | HEART RATE: 111 BPM | WEIGHT: 142.42 LBS | SYSTOLIC BLOOD PRESSURE: 112 MMHG

## 2018-11-09 DIAGNOSIS — R05.9 COUGH: ICD-10-CM

## 2018-11-09 DIAGNOSIS — T78.40XA ALLERGIC REACTION, INITIAL ENCOUNTER: Primary | ICD-10-CM

## 2018-11-09 LAB
A ALTERNATA IGE QN: 5.19 KU/L
A FUMIGATUS IGE QN: 0.58 KU/L
ALBUMIN SERPL-MCNC: 4.4 G/DL (ref 3.5–5.5)
ALBUMIN/GLOB SERPL: 1.6 {RATIO} (ref 1.2–2.2)
ALP SERPL-CCNC: 76 IU/L (ref 39–117)
ALT SERPL-CCNC: 11 IU/L (ref 0–32)
AMER ROACH IGE QN: <0.1 KU/L
AST SERPL-CCNC: 10 IU/L (ref 0–40)
B BURGDOR IGG PATRN SER IB-IMP: NEGATIVE
B BURGDOR IGM PATRN SER IB-IMP: NEGATIVE
B BURGDOR18KD IGG SER QL IB: ABNORMAL
B BURGDOR23KD IGG SER QL IB: ABNORMAL
B BURGDOR23KD IGM SER QL IB: ABNORMAL
B BURGDOR28KD IGG SER QL IB: ABNORMAL
B BURGDOR30KD IGG SER QL IB: ABNORMAL
B BURGDOR39KD IGG SER QL IB: ABNORMAL
B BURGDOR39KD IGM SER QL IB: PRESENT
B BURGDOR41KD IGG SER QL IB: ABNORMAL
B BURGDOR41KD IGM SER QL IB: ABNORMAL
B BURGDOR45KD IGG SER QL IB: ABNORMAL
B BURGDOR58KD IGG SER QL IB: ABNORMAL
B BURGDOR66KD IGG SER QL IB: ABNORMAL
B BURGDOR93KD IGG SER QL IB: ABNORMAL
BAHIA GRASS IGE QN: 3.27 KU/L
BASOPHILS # BLD AUTO: 0 X10E3/UL (ref 0–0.2)
BASOPHILS NFR BLD AUTO: 1 %
BERMUDA GRASS IGE QN: 3 KU/L
BILIRUB SERPL-MCNC: 0.5 MG/DL (ref 0–1.2)
BOXELDER IGE QN: 0.14 KU/L
BUN SERPL-MCNC: 9 MG/DL (ref 6–20)
BUN/CREAT SERPL: 18 (ref 9–23)
C HERBARUM IGE QN: 0.43 KU/L
CALCIUM SERPL-MCNC: 9 MG/DL (ref 8.7–10.2)
CAT DANDER IGE QN: 7.52 KU/L
CENTROMERE B AB SER-ACNC: <0.2 AI (ref 0–0.9)
CHLORIDE SERPL-SCNC: 101 MMOL/L (ref 96–106)
CHOLEST SERPL-MCNC: 152 MG/DL (ref 100–199)
CHROMATIN AB SERPL-ACNC: <0.2 AI (ref 0–0.9)
CLAM IGE QN: <0.1 KU/L
CMN PIGWEED IGE QN: 0.35 KU/L
CO2 SERPL-SCNC: 25 MMOL/L (ref 20–29)
CODFISH IGE QN: <0.1 KU/L
COMMON RAGWEED IGE QN: 0.11 KU/L
CORN IGE QN: 0.12 KU/L
COW MILK IGE QN: 0.17 KU/L
CREAT SERPL-MCNC: 0.5 MG/DL (ref 0.57–1)
D FARINAE IGE QN: 4.79 KU/L
D PTERONYSS IGE QN: 4.09 KU/L
DOG DANDER IGE QN: 0.63 KU/L
DSDNA AB SER-ACNC: 1 IU/ML (ref 0–9)
EGG WHITE IGE QN: 0.2 KU/L
ENA JO1 AB SER-ACNC: <0.2 AI (ref 0–0.9)
ENA RNP AB SER-ACNC: <0.2 AI (ref 0–0.9)
ENA SCL70 AB SER-ACNC: <0.2 AI (ref 0–0.9)
ENA SM AB SER-ACNC: <0.2 AI (ref 0–0.9)
ENA SS-A AB SER-ACNC: <0.2 AI (ref 0–0.9)
ENA SS-B AB SER-ACNC: <0.2 AI (ref 0–0.9)
ENGL PLANTAIN IGE QN: 0.12 KU/L
EOSINOPHIL # BLD AUTO: 0.1 X10E3/UL (ref 0–0.4)
EOSINOPHIL NFR BLD AUTO: 3 %
ERYTHROCYTE [DISTWIDTH] IN BLOOD BY AUTOMATED COUNT: 12.9 % (ref 12.3–15.4)
EST. AVERAGE GLUCOSE BLD GHB EST-MCNC: 108 MG/DL
GLOBULIN SER CALC-MCNC: 2.8 G/DL (ref 1.5–4.5)
GLUCOSE SERPL-MCNC: 93 MG/DL (ref 65–99)
GLUTEN IGE QN: <0.1 KU/L
HBA1C MFR BLD: 5.4 % (ref 4.8–5.6)
HCT VFR BLD AUTO: 36.2 % (ref 34–46.6)
HDLC SERPL-MCNC: 47 MG/DL
HGB BLD-MCNC: 11.9 G/DL (ref 11.1–15.9)
HIV 1+2 AB+HIV1 P24 AG SERPL QL IA: NON REACTIVE
IMM GRANULOCYTES # BLD: 0 X10E3/UL (ref 0–0.1)
IMM GRANULOCYTES NFR BLD: 0 %
JOHNSON GRASS IGE QN: 2.41 KU/L
LDLC SERPL CALC-MCNC: 93 MG/DL (ref 0–99)
LONDON PLANE IGE QN: 0.15 KU/L
LYMPHOCYTES # BLD AUTO: 0.8 X10E3/UL (ref 0.7–3.1)
LYMPHOCYTES NFR BLD AUTO: 20 %
Lab: ABNORMAL
M RACEMOSUS IGE QN: <0.1 KU/L
MCH RBC QN AUTO: 30.9 PG (ref 26.6–33)
MCHC RBC AUTO-ENTMCNC: 32.9 G/DL (ref 31.5–35.7)
MCV RBC AUTO: 94 FL (ref 79–97)
MONOCYTES # BLD AUTO: 0.7 X10E3/UL (ref 0.1–0.9)
MONOCYTES NFR BLD AUTO: 17 %
MT JUNIPER IGE QN: 0.18 KU/L
MUGWORT IGE QN: <0.1 KU/L
NETTLE IGE QN: 0.18 KU/L
NEUTROPHILS # BLD AUTO: 2.5 X10E3/UL (ref 1.4–7)
NEUTROPHILS NFR BLD AUTO: 59 %
P NOTATUM IGE QN: 0.36 KU/L
PEANUT IGE QN: 0.14 KU/L
PLATELET # BLD AUTO: 209 X10E3/UL (ref 150–379)
POTASSIUM SERPL-SCNC: 3.9 MMOL/L (ref 3.5–5.2)
PROT SERPL-MCNC: 7.2 G/DL (ref 6–8.5)
RBC # BLD AUTO: 3.85 X10E6/UL (ref 3.77–5.28)
S BOTRYOSUM IGE QN: 3.15 KU/L
SCALLOP IGE QN: <0.1 KU/L
SEE BELOW, 164869: NORMAL
SESAME SEED IGE QN: 0.14 KU/L
SHEEP SORREL IGE QN: <0.1 KU/L
SHRIMP IGE QN: <0.1 KU/L
SILVER BIRCH IGE QN: <0.1 KU/L
SODIUM SERPL-SCNC: 139 MMOL/L (ref 134–144)
SOYBEAN IGE QN: <0.1 KU/L
SWEET GUM IGE QN: <0.1 KU/L
TIMOTHY IGE QN: 10.1 KU/L
TRIGL SERPL-MCNC: 58 MG/DL (ref 0–149)
TSH SERPL DL<=0.005 MIU/L-ACNC: 2.59 UIU/ML (ref 0.45–4.5)
VLDLC SERPL CALC-MCNC: 12 MG/DL (ref 5–40)
WALNUT IGE QN: <0.1 KU/L
WBC # BLD AUTO: 4.1 X10E3/UL (ref 3.4–10.8)
WHEAT IGE QN: 0.29 KU/L
WHITE ELM IGE QN: 0.19 KU/L
WHITE HICKORY IGE QN: 2.28 KU/L
WHITE MULBERRY IGE QN: <0.1 KU/L
WHITE OAK IGE QN: 0.11 KU/L

## 2018-11-09 PROCEDURE — 74011250637 HC RX REV CODE- 250/637: Performed by: EMERGENCY MEDICINE

## 2018-11-09 PROCEDURE — 99283 EMERGENCY DEPT VISIT LOW MDM: CPT

## 2018-11-09 PROCEDURE — 74011636637 HC RX REV CODE- 636/637: Performed by: EMERGENCY MEDICINE

## 2018-11-09 RX ORDER — PREDNISONE 10 MG/1
40 TABLET ORAL
Status: COMPLETED | OUTPATIENT
Start: 2018-11-09 | End: 2018-11-09

## 2018-11-09 RX ORDER — DIPHENHYDRAMINE HCL 25 MG
25 CAPSULE ORAL
Status: COMPLETED | OUTPATIENT
Start: 2018-11-09 | End: 2018-11-09

## 2018-11-09 RX ORDER — FAMOTIDINE 20 MG/1
20 TABLET, FILM COATED ORAL
Status: COMPLETED | OUTPATIENT
Start: 2018-11-09 | End: 2018-11-09

## 2018-11-09 RX ORDER — ACETAMINOPHEN AND CODEINE PHOSPHATE 120; 12 MG/5ML; MG/5ML
5 SOLUTION ORAL
Qty: 50 ML | Refills: 0 | Status: SHIPPED | OUTPATIENT
Start: 2018-11-09 | End: 2018-11-14

## 2018-11-09 RX ORDER — PREDNISONE 20 MG/1
40 TABLET ORAL DAILY
Qty: 6 TAB | Refills: 0 | Status: SHIPPED | OUTPATIENT
Start: 2018-11-09 | End: 2018-11-12

## 2018-11-09 RX ORDER — FAMOTIDINE 20 MG/1
20 TABLET, FILM COATED ORAL 2 TIMES DAILY
Qty: 6 TAB | Refills: 0 | Status: SHIPPED | OUTPATIENT
Start: 2018-11-09 | End: 2018-11-12

## 2018-11-09 RX ADMIN — DIPHENHYDRAMINE HYDROCHLORIDE 25 MG: 25 CAPSULE ORAL at 16:53

## 2018-11-09 RX ADMIN — FAMOTIDINE 20 MG: 20 TABLET ORAL at 16:53

## 2018-11-09 RX ADMIN — PREDNISONE 40 MG: 10 TABLET ORAL at 16:53

## 2018-11-09 NOTE — ED PROVIDER NOTES
EMERGENCY DEPARTMENT HISTORY AND PHYSICAL EXAM 
 
 
Date: 11/9/2018 Patient Name: Nika Linares History of Presenting Illness Chief Complaint Patient presents with  Allergic Reaction  
  pt took qvar on advice of her allergist approx 30 min ago. started breaking out in hives and throat is tight. took benadryl prior to coming History Provided By: Patient HPI: Nika Linares, 34 y.o. female with PMHx significant for GERD, hypoglycemia, migraine with aura, hypoglycemia, hyponephrosis, sacroiliitis, lupus, fibromyalgia, presents ambulatory to the ED with cc of breaking out in hives that occurred 30 minutes PTA. Pt reports some throat tightness, difficulty swallowing, puffiness in her cheeks, SOB, tingling, numbness, rash to the right chin and felt hot. 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 Pt states she took qvar per allergy and a few minutes later she started having her Sx. Pt states at the allergist office she was given a nebulizer treatment. Pt notes she has taken steroids in the past orally. She denies any modifying factors to her Sx. She specifically denies CP, fever, chills, nausea, vomiting, diarrhea, abd pain. There are no other complaints, changes, or physical findings at this time. Medical History: GERD, hypoglycemia, migraine with aura, hypoglycemia, hyponephrosis, sacroiliitis, lupus, fibromyalgia Surgical History: colonoscopy, adenoidectomy, tonsillectomy Social History: -tobacco, -EtOH, -Illicit Drugs PCP: [facility], DO 
 
Current Outpatient Medications Medication Sig Dispense Refill  famotidine (PEPCID) 20 mg tablet Take 1 Tab by mouth two (2) times a day for 3 days. 6 Tab 0  predniSONE (DELTASONE) 20 mg tablet Take 40 mg by mouth daily for 3 days.  6 Tab 0  
 acetaminophen-codeine (TYLENOL-CODEINE #3) 300 mg-30 mg /12.5 mL solution Take 5 mL by mouth every six (6) hours as needed for Pain. Max Daily Amount: 20 mL. 50 mL 0  
 butalbital-acetaminophen-caffeine (FIORICET, ESGIC) -40 mg per tablet Take 1 Tab by mouth every six (6) hours as needed for Headache. 30 Tab 1  
 multivitamin (ONE A DAY) tablet Take 1 Tab by mouth daily.  cholecalciferol, vitamin D3, (VITAMIN D3) 2,000 unit tab Take 5,000 Units by mouth.  nortriptyline (PAMELOR) 10 mg capsule Take 1 Cap by mouth nightly. 30 Cap 1  rizatriptan (MAXALT-MLT) 10 mg disintegrating tablet Take 1 Tab by mouth once as needed for Migraine for up to 1 dose. 9 Tab 1  cyanocobalamin (VITAMIN B-12) 1,000 mcg tablet Take 1,000 mcg by mouth daily. Past History Past Medical History: 
Past Medical History:  
Diagnosis Date  Abdominal pain, other specified site  Calculus of kidney RIGHT  Fibromyalgia 06/2016 [facility]  GERD (gastroesophageal reflux disease)  Hydronephrosis  Hypoglycemia, unspecified  Irregular menstrual cycle  Lumbago  Lupus 06/2016 [facility]  Migraine with aura, without mention of intractable migraine without mention of status migrainosus  Other ill-defined conditions(799.89)   
 mitral valve prolapse  Sacroiliitis, not elsewhere classified ([address] Past Surgical History: 
Past Surgical History:  
Procedure Laterality Date  HX ADENOIDECTOMY  HX TONSILLECTOMY  IN COLONOSCOPY FLX DX W/COLLJ SPEC WHEN PFRMD  4/7/2014  IN EGD TRANSORAL BIOPSY SINGLE/MULTIPLE  4/7/2014 Family History: 
Family History Problem Relation Age of Onset  Cancer Mother  Diabetes Mother [facility] Arthritis-osteo Mother  Thyroid Disease Mother  Migraines Mother  Seizures Mother  Diabetes Maternal Grandmother  Rashes/Skin Problems Maternal Grandmother  Diabetes Maternal Grandfather  Heart Disease Maternal Grandfather  Heart Attack Maternal Grandfather  Hypertension Maternal Grandfather  High Cholesterol Maternal Grandfather  Stroke Maternal Grandfather Social History: 
Social History Tobacco Use  Smoking status: Never Smoker  Smokeless tobacco: Never Used Substance Use Topics  Alcohol use: No  
 Drug use: No  
 
 
Allergies: Allergies Allergen Reactions  Latex Hives  Milk Anaphylaxis  Ciprofloxacin Other (comments)  Guaifenesin Other (comments) Gained weight  Ibuprofen Other (comments) \"started burning really bad\"  Influenza Virus Vaccine, Specific Other (comments) Quadrivalent Severe HA and neurological symptoms  Oxycodone Other (comments)  Qvar [Beclomethasone Dipropionate] Hives  Shellfish Derived Rash  Sulfa (Sulfonamide Antibiotics) Hives and Other (comments)  
  achy  [id number] Antimigraine Agents Palpitations Chest pain  Wheat Other (comments) Mouth bleeds Review of Systems Review of Systems Constitutional: Negative for chills and fever. HENT: Positive for trouble swallowing. Negative for congestion and sore throat. Reports throat tightness, reports puffiness in her cheeks Eyes: Negative for visual disturbance. Respiratory: Positive for shortness of breath. Negative for cough. Cardiovascular: Negative for chest pain and leg swelling. Gastrointestinal: Negative for abdominal pain, blood in stool, diarrhea, nausea and vomiting. Endocrine: Negative for polyuria. Genitourinary: Negative for dysuria, flank pain, vaginal bleeding and vaginal discharge. Musculoskeletal: Negative for myalgias. Skin: Positive for rash. Allergic/Immunologic: Negative for immunocompromised state. Neurological: Positive for numbness. Negative for weakness and headaches. Reports tingling Psychiatric/Behavioral: Negative for confusion.   
 
 
Physical Exam  
Physical Exam  
 Constitutional: She is oriented to person, place, and time. She appears well-developed and well-nourished. She does not appear ill. No distress. Nml phonation HENT:  
Head: Normocephalic and atraumatic. Mouth/Throat: Oropharynx is clear and moist. lymphoid hyperplasia in posterior pharynx. No lip or tongue swelling Normal phonation Eyes: Conjunctivae are normal. Pupils are equal, round, and reactive to light. Neck: Neck supple. No tracheal deviation present. Cardiovascular: Regular rhythm. Exam reveals no friction rub. No murmur heard. tachycardia Pulmonary/Chest: Effort normal and breath sounds normal. No accessory muscle usage. No respiratory distress. She has no wheezes. No stridor Abdominal: Soft. She exhibits no distension. There is no tenderness. Lymphadenopathy:  
  She has no cervical adenopathy. Neurological: She is alert and oriented to person, place, and time. She has normal strength. No cranial nerve deficit or sensory deficit. Skin: Skin is warm and dry. Skin had erythema to cheeks Nursing note and vitals reviewed. Medical Decision Making I am the first provider for this patient. I reviewed the vital signs, available nursing notes, past medical history, past surgical history, family history and social history. Vital Signs-Reviewed the patient's vital signs. Patient Vitals for the past 12 hrs: 
 Temp Pulse Resp BP SpO2  
11/09/18 1730    112/75 99 % 11/09/18 1718     99 % 11/09/18 1618    124/48 (!) 89 % 11/09/18 1553 99 °F (37.2 °C) (!) 111 18 123/78 100 % Pulse Oximetry Analysis - 100% on RA Cardiac Monitor:  
Rate: 111 bpm 
Rhythm: Sinus Tachycardia Records Reviewed: Nursing Notes, Old Medical Records, Previous Radiology Studies and Previous Laboratory Studies Provider Notes (Medical Decision Making): Consistent with allergic RXN, pt protecting airway no need for epinephrine. ED Course: Initial assessment performed. The patients presenting problems have been discussed, and they are in agreement with the care plan formulated and outlined with them. I have encouraged them to ask questions as they arise throughout their visit. Medications  
predniSONE (DELTASONE) tablet 40 mg (40 mg Oral Given 11/9/18 1653)  
famotidine (PEPCID) tablet 20 mg (20 mg Oral Given 11/9/18 1653) diphenhydrAMINE (BENADRYL) capsule 25 mg (25 mg Oral Given 11/9/18 1653) Progress Note: 
5:37 PM 
Throat swelling feels improved. Pt is ready for discharge. Critical Care Time:  
0 minutes Disposition: 
Discharge Note: 
5:38 PM 
The pt is ready for discharge. The pt's signs, symptoms, diagnosis, and discharge instructions have been discussed and pt has conveyed their understanding. The pt is to follow up as recommended or return to ER should their symptoms worsen. Plan has been discussed and pt is in agreement. PLAN: 
1. Discharge Medication List as of 11/9/2018  5:40 PM  
  
START taking these medications Details  
famotidine (PEPCID) 20 mg tablet Take 1 Tab by mouth two (2) times a day for 3 days. , Normal, Disp-6 Tab, R-0  
  
predniSONE (DELTASONE) 20 mg tablet Take 40 mg by mouth daily for 3 days. , Normal, Disp-6 Tab, R-0  
  
  
CONTINUE these medications which have NOT CHANGED Details  
butalbital-acetaminophen-caffeine (FIORICET, ESGIC) -40 mg per tablet Take 1 Tab by mouth every six (6) hours as needed for Headache., Normal, Disp-30 Tab, R-1  
  
multivitamin (ONE A DAY) tablet Take 1 Tab by mouth daily. , Historical Med  
  
cholecalciferol, vitamin D3, (VITAMIN D3) 2,000 unit tab Take 5,000 Units by mouth., Historical Med  
  
nortriptyline (PAMELOR) 10 mg capsule Take 1 Cap by mouth nightly., Normal, Disp-30 Cap, R-1  
  
rizatriptan (MAXALT-MLT) 10 mg disintegrating tablet Take 1 Tab by mouth once as needed for Migraine for up to 1 dose., Normal, Disp-9 Tab, R-1  
  
 cyanocobalamin (VITAMIN B-12) 1,000 mcg tablet Take 1,000 mcg by mouth daily. , Historical Med 2. Follow-up Information Follow up With Specialties Details Why Contact Shaun Tolliver DO Internal Medicine  As needed [address] [facility] 
[phone] [facility] EMERGENCY DEPT Emergency Medicine  If symptoms worsen [address] [facility] 
[phone] Return to ED if worse Diagnosis Clinical Impression: 1. Allergic reaction, initial encounter 2. Cough Attestations: This note is prepared by Phillip Salazar. Olivia Joshua, acting as Scribe for [employer], DO. [employer], DO: The scribe's documentation has been prepared under my direction and personally reviewed by me in its entirety. I confirm that the note above accurately reflects all work, treatment, procedures, and medical decision making performed by me. This note will not be viewable in [address]

## 2018-11-09 NOTE — LETTER
[address] 
[facility] EMERGENCY DEPT 
[address] [place] [address] Mela Rogers [id number] [phone] Work/School Note Date: 11/9/2018 To Whom It May concern: 
 
Gloria Cao was seen and treated today in the emergency room by the following provider(s): 
Attending Provider: Sebastien Khan DO. Gloria Cao may return to work on 11/13/18 Sincerely, [employer], DO

## 2018-11-09 NOTE — DISCHARGE INSTRUCTIONS
Allergic Reaction: Care Instructions  Your Care Instructions    An allergic reaction is an excessive response from your immune system to a medicine, chemical, food, insect bite, or other substance. A reaction can range from mild to life-threatening. Some people have a mild rash, hives, and itching or stomach cramps. In severe reactions, swelling of your tongue and throat can close up your airway so that you cannot breathe. Follow-up care is a key part of your treatment and safety. Be sure to make and go to all appointments, and call your doctor if you are having problems. It's also a good idea to know your test results and keep a list of the medicines you take. How can you care for yourself at home? · If you know what caused your allergic reaction, be sure to avoid it. Your allergy may become more severe each time you have a reaction. · Take an over-the-counter antihistamine, such as cetirizine (Zyrtec) or loratadine (Claritin), to treat mild symptoms. Read and follow directions on the label. Some antihistamines can make you feel sleepy. Do not give antihistamines to a child unless you have checked with your doctor first. Mild symptoms include sneezing or an itchy or runny nose; an itchy mouth; a few hives or mild itching; and mild nausea or stomach discomfort. · Do not scratch hives or a rash. Put a cold, moist towel on them or take cool baths to relieve itching. Put ice packs on hives, swelling, or insect stings for 10 to 15 minutes at a time. Put a thin cloth between the ice pack and your skin. Do not take hot baths or showers. They will make the itching worse. · Your doctor may prescribe a shot of epinephrine to carry with you in case you have a severe reaction. Learn how to give yourself the shot and keep it with you at all times. Make sure it is not . · Go to the emergency room every time you have a severe reaction, even if you have used your shot of epinephrine and are feeling better. Symptoms can come back after a shot. · Wear medical alert jewelry that lists your allergies. You can buy this at most drugstores. · If your child has a severe allergy, make sure that his or her teachers, babysitters, coaches, and other caregivers know about the allergy. They should have an epinephrine shot, know how and when to give it, and have a plan to take your child to the hospital.  When should you call for help? Give an epinephrine shot if:    · You think you are having a severe allergic reaction.     · You have symptoms in more than one body area, such as mild nausea and an itchy mouth.    After giving an epinephrine shot call 911, even if you feel better.   Call 911 if:    · You have symptoms of a severe allergic reaction. These may include:  ? Sudden raised, red areas (hives) all over your body. ? Swelling of the throat, mouth, lips, or tongue. ? Trouble breathing. ? Passing out (losing consciousness). Or you may feel very lightheaded or suddenly feel weak, confused, or restless.     · You have been given an epinephrine shot, even if you feel better.    Call your doctor now or seek immediate medical care if:    · You have symptoms of an allergic reaction, such as:  ? A rash or hives (raised, red areas on the skin). ? Itching. ? Swelling. ? Belly pain, nausea, or vomiting.    Watch closely for changes in your health, and be sure to contact your doctor if:    · You do not get better as expected. Where can you learn more? Go to http://margaret-nikki.info/. Enter U892 in the search box to learn more about \"Allergic Reaction: Care Instructions. \"  Current as of: June 28, 2018  Content Version: 11.8  © 4779-0223 [employer]. Care instructions adapted under license by [employer] (which disclaims liability or warranty for this information).  If you have questions about a medical condition or this instruction, always ask your healthcare professional. [employer] disclaims any warranty or liability for your use of this information.

## 2018-11-11 NOTE — PROGRESS NOTES
Lots of allergies, she can look online via my chart, but biggest offenders are kenneth grass and cat dander . Make sure she is seeing an allergist for recs. Other labs look fine.

## 2018-11-12 RX ORDER — PREDNISONE 20 MG/1
20 TABLET ORAL SEE ADMIN INSTRUCTIONS
Qty: 5 TAB | Refills: 0 | Status: SHIPPED | OUTPATIENT
Start: 2018-11-12 | End: 2019-02-21 | Stop reason: ALTCHOICE

## 2018-11-12 NOTE — TELEPHONE ENCOUNTER
Spoke with patient after 2 patient identifiers being note and advised that she thinks that she will need more prednisone, and wants to know MD will fill out FMLA paperwork for her. I advised that I would send her request to Md for advisement. Patient expressed understanding and has no further questions at this time.

## 2018-11-12 NOTE — PROGRESS NOTES
Called, spoke to pt. Two identifiers confirmed. Pt notified of results per Dr. Hargrove Found Copy of results faxed to pt's allergist Dr. Jorge Alberto Hale. Pt stated she has an appointment with the NP at his office tomorrow (11/13). Pt verbalized understanding of information discussed w/ no further questions at this time.

## 2018-11-12 NOTE — TELEPHONE ENCOUNTER
Pt called to schedule an appt. Went to ER due to bronchitis, meds that [facility] gave her lead to an allergic reaction. Was told to follow up with PCP. Runs out of meds today, 11/12/18. Also has [facility] paper work that needs to be filled out.      Call back at [phone]

## 2018-11-13 ENCOUNTER — HOSPITAL ENCOUNTER (OUTPATIENT)
Dept: GENERAL RADIOLOGY | Age: 33
Discharge: HOME | End: 2018-11-13
Payer: COMMERCIAL

## 2018-11-13 DIAGNOSIS — R05.3 CHRONIC COUGH: ICD-10-CM

## 2018-11-13 DIAGNOSIS — J40 BRONCHITIS: ICD-10-CM

## 2018-11-13 PROCEDURE — 71046 X-RAY EXAM CHEST 2 VIEWS: CPT

## 2018-11-14 ENCOUNTER — OFFICE VISIT (OUTPATIENT)
Dept: NEUROLOGY | Age: 33
End: 2018-11-14

## 2018-11-14 VITALS
DIASTOLIC BLOOD PRESSURE: 62 MMHG | HEART RATE: 78 BPM | HEIGHT: 65 IN | WEIGHT: 144 LBS | RESPIRATION RATE: 18 BRPM | OXYGEN SATURATION: 97 % | SYSTOLIC BLOOD PRESSURE: 102 MMHG | BODY MASS INDEX: 23.99 KG/M2

## 2018-11-14 DIAGNOSIS — G44.221 CHRONIC TENSION-TYPE HEADACHE, INTRACTABLE: ICD-10-CM

## 2018-11-14 DIAGNOSIS — G43.709 CHRONIC MIGRAINE WITHOUT AURA WITHOUT STATUS MIGRAINOSUS, NOT INTRACTABLE: Primary | ICD-10-CM

## 2018-11-14 RX ORDER — CYCLOBENZAPRINE HCL 5 MG
5 TABLET ORAL
COMMUNITY
End: 2018-11-15 | Stop reason: SDUPTHER

## 2018-11-14 RX ORDER — FAMOTIDINE 10 MG/1
10 TABLET ORAL 2 TIMES DAILY
COMMUNITY
End: 2019-05-09 | Stop reason: SDUPTHER

## 2018-11-14 NOTE — PATIENT INSTRUCTIONS
[facility]   Statement to Patients  April 1, 2014      In an effort to ensure the large volume of patient prescription refills is processed in the most efficient and expeditious manner, we are asking our patients to assist us by calling your Pharmacy for all prescription refills, this will include also your  Mail Order Pharmacy. The pharmacy will contact our office electronically to continue the refill process. Please do not wait until the last minute to call your pharmacy. We need at least 48 hours (2days) to fill prescriptions. We also encourage you to call your pharmacy before going to  your prescription to make sure it is ready. With regard to controlled substance prescription refill requests (narcotic refills) that need to be picked up at our office, we ask your cooperation by providing us with at least 72 hours (3days) notice that you will need a refill. We will not refill narcotic prescription refill requests after 4:00pm on any weekday, Monday through Thursday, or after 2:00pm on Fridays, or on the weekends. We encourage everyone to explore another way of getting your prescription refill request processed using [employer], our patient web portal through our electronic medical record system. [employer] is an efficient and effective way to communicate your medication request directly to the office and  downloadable as an baldomero on your smart phone . [employer] also features a review functionality that allows you to view your medication list as well as leave messages for your physician. Are you ready to get connected? If so please review the attatched instructions or speak to any of our staff to get you set up right away! Thank you so much for your cooperation. Should you have any questions please contact our Practice Administrator.     The Physicians and Staff,  [address] [place] [address] Neurology Clinic                A Healthy Lifestyle: Care Instructions  Your Care Instructions    A healthy lifestyle can help you feel good, stay at a healthy weight, and have plenty of energy for both work and play. A healthy lifestyle is something you can share with your whole family. A healthy lifestyle also can lower your risk for serious health problems, such as high blood pressure, heart disease, and diabetes. You can follow a few steps listed below to improve your health and the health of your family. Follow-up care is a key part of your treatment and safety. Be sure to make and go to all appointments, and call your doctor if you are having problems. It's also a good idea to know your test results and keep a list of the medicines you take. How can you care for yourself at home? · Do not eat too much sugar, fat, or fast foods. You can still have dessert and treats now and then. The goal is moderation. · Start small to improve your eating habits. Pay attention to portion sizes, drink less juice and soda pop, and eat more fruits and vegetables. ? Eat a healthy amount of food. A 3-ounce serving of meat, for example, is about the size of a deck of cards. Fill the rest of your plate with vegetables and whole grains. ? Limit the amount of soda and sports drinks you have every day. Drink more water when you are thirsty. ? Eat at least 5 servings of fruits and vegetables every day. It may seem like a lot, but it is not hard to reach this goal. A serving or helping is 1 piece of fruit, 1 cup of vegetables, or 2 cups of leafy, raw vegetables. Have an apple or some carrot sticks as an afternoon snack instead of a candy bar. Try to have fruits and/or vegetables at every meal.  · Make exercise part of your daily routine. You may want to start with simple activities, such as walking, bicycling, or slow swimming. Try to be active 30 to 60 minutes every day. You do not need to do all 30 to 60 minutes all at once. For example, you can exercise 3 times a day for 10 or 20 minutes.  Moderate exercise is safe for most people, but it is always a good idea to talk to your doctor before starting an exercise program.  · Keep moving. Martin Chance the lawn, work in the garden, or Augment[employer]. Take the stairs instead of the elevator at work. · If you smoke, quit. People who smoke have an increased risk for heart attack, stroke, cancer, and other lung illnesses. Quitting is hard, but there are ways to boost your chance of quitting tobacco for good. ? Use nicotine gum, patches, or lozenges. ? Ask your doctor about stop-smoking programs and medicines. ? Keep trying. In addition to reducing your risk of diseases in the future, you will notice some benefits soon after you stop using tobacco. If you have shortness of breath or asthma symptoms, they will likely get better within a few weeks after you quit. · Limit how much alcohol you drink. Moderate amounts of alcohol (up to 2 drinks a day for men, 1 drink a day for women) are okay. But drinking too much can lead to liver problems, high blood pressure, and other health problems. Family health  If you have a family, there are many things you can do together to improve your health. · Eat meals together as a family as often as possible. · Eat healthy foods. This includes fruits, vegetables, lean meats and dairy, and whole grains. · Include your family in your fitness plan. Most people think of activities such as jogging or tennis as the way to fitness, but there are many ways you and your family can be more active. Anything that makes you breathe hard and gets your heart pumping is exercise. Here are some tips:  ? Walk to do errands or to take your child to school or the bus.  ? Go for a family bike ride after dinner instead of watching TV. Where can you learn more? Go to http://margaret-nikki.info/. Enter R083 in the search box to learn more about \"A Healthy Lifestyle: Care Instructions. \"  Current as of: December 7, 2017  Content Version: 11.8  © 3720-3536 [employer]. Care instructions adapted under license by [employer] (which disclaims liability or warranty for this information). If you have questions about a medical condition or this instruction, always ask your healthcare professional. [address] any warranty or liability for your use of this information.

## 2018-11-14 NOTE — PROGRESS NOTES
Date:            18     Name:  Rita Lane  :  1985  MRN:  [id number]     PCP:  Ilya Rivera DO    Chief Complaint   Patient presents with    Migraine         HISTORY OF PRESENT ILLNESS:  Mario Haro is a 34 y.o., female who presents today for follow up for headaches. She was last seen by Dr. Valerio Prado in March, put on nortriptyline for preventative at that time. She did not try nortriptyline, has been afraid to because of potential mood changes. She had mood changes on Cymbalta, gabapentin. She is not on preventative at this time, is still kind of reluctant to try another prescription preventative due to concern about side effects. Work stress, poor sleep trigger migraines. She tried an elimination diet, ended up cutting out dairy and still has headaches. She has a low grade daily headache, every three weeks or so she gets migrainous escalation with nausea, photo and phonophobia and she has to stay home from work for a few days. She does not really have an effective abortive plan right now. She takes flexeril when she gets a headache, it helps with the muscle tension but not the HA. She has an rx for fioricet from her PCP, has not tried it yet. She is seeing an allergist and has a lot of allergic reactions to food, medications. She has a history of frequent kidney stones, can't do topiramate. Sumatriptan caused chest pain, she has not tried maxalt as ordered by Dr. Valerio Prado but would like to. Her mother takes Amerge, tolerates that well. She is getting dry needling, that helps prevent bigger headaches and she is having less of the headaches that keep her out of work. Prior preventatives:  Cymbalta   Gabapentin (stopped working after [employer])      3.22.2018 recap  Mario Haro came back for a follow up.   She has been seen in the past for migraine-like headaches and occipital neuralgia for which she had tried occipital nerve block and different medications including gabapentin, Cymbalta and lamotrigine.       She does not get much occipital neuralgia anymore but recently she has been having migrainous headaches. These involve mainly one-sided, sharp, throbbing pain, associated with light and sound sensitivity. 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 Her frequency is about 1-2 headaches per week. She tries to take Excedrin or Tylenol which may or may not work. She tried sumatriptan once but experienced some palpitations. She was having sensitivity to several different other medications during that time. She is not taking any prophylactics at this time. She was prescribed Flexeril at the last visit but it was making her very tired. She is open to trying a different prophylactic. She has recently changed her diet significantly and has cut out dairy, meats and artificial sweeteners.      She has been told that she has fibromyalgia and possibly lupus     Current Outpatient Medications   Medication Sig    cyclobenzaprine (FLEXERIL) 5 mg tablet Take 5 mg by mouth.  famotidine (PEPCID) 10 mg tablet Take 10 mg by mouth two (2) times a day.  predniSONE (DELTASONE) 20 mg tablet Take 20 mg by mouth See Admin Instructions. Take 1 tablet daily for 3 days, then 1/2 tablet (10 mg) for four days then stop    butalbital-acetaminophen-caffeine (FIORICET, ESGIC) -40 mg per tablet Take 1 Tab by mouth every six (6) hours as needed for Headache. No current facility-administered medications for this visit.       Allergies   Allergen Reactions    Latex Hives    Milk Anaphylaxis    Ciprofloxacin Other (comments)    Guaifenesin Other (comments)     Gained weight    Ibuprofen Other (comments)     \"started burning really bad\"    Influenza Virus Vaccine, Specific Other (comments)     Quadrivalent   Severe HA and neurological symptoms     Oxycodone Other (comments)    Qvar [Beclomethasone Dipropionate] Hives    Shellfish Derived Rash    Sulfa (Sulfonamide Antibiotics) Hives and Other (comments)     achy    [id number] Antimigraine Agents Palpitations     Chest pain    Wheat Other (comments)     Mouth bleeds     Past Medical History:   Diagnosis Date    Abdominal pain, other specified site     Calculus of kidney     RIGHT    Fib[facility]algia 06/2016    [facility]    GERD (gastroesophageal reflux disease)     Hydronephrosis     Hypoglycemia, unspecified     Irregular menstrual cycle     Lumbago     Lupus 06/2016    [facility]    Migraine with aura, without mention of intractable migraine without mention of status migrainosus     Other ill-defined conditions(799.89)     mitral valve prolapse    Sacroiliitis, not elsewhere classified ([facility] [address]      Past Surgical History:   Procedure Laterality Date    HX ADENOIDECTOMY      HX TONSILLECTOMY      WI COLONOSCOPY FLX DX W/COLLJ SPEC WHEN PFRMD  4/7/2014         WI EGD TRANSORAL BIOPSY SINGLE/MULTIPLE  4/7/2014          Social History     Socioeconomic History    Marital status: SINGLE     Spouse name: Not on file    Number of children: 0    Years of education: Not on file    Highest education level: Not on file   Social Needs    Financial resource strain: Not on file    Food insecurity - worry: Not on file    Food insecurity - inability: Not on file   [employer] needs - medical: Not on file   [employer] needs - non-medical: Not on file   Occupational History     Employer: [employer]   Tobacco Use    Smoking status: Never Smoker    Smokeless tobacco: Never Used   Substance and Sexual Activity    Alcohol use: No    Drug use: No    Sexual activity: Yes     Partners: Male     Birth control/protection: Condom   Other Topics Concern    Not on file   Social History Narrative    Not on file     Family History   Problem Relation Age of Onset    Cancer Mother     Diabetes Mother     Arthritis-osteo Mother     Thyroid Disease Mother     Migraines Mother     Seizures Mother     Diabetes Maternal Grandmother     Rashes/Skin Problems Maternal Grandmother     Diabetes Maternal Grandfather     Heart Disease Maternal Grandfather     Heart Attack Maternal Grandfather     Hypertension Maternal Grandfather     High Cholesterol Maternal Grandfather     Stroke Maternal Grandfather          PHYSICAL EXAMINATION:    Visit Vitals  /62   Pulse 78   Resp 18   Ht 5' 5\" (1.651 m)   Wt 65.3 kg (144 lb)   SpO2 97%   BMI 23.96 kg/m²     General:  Well defined, nourished, and groomed individual in no acute distress. Neck: Supple, nontender, no bruits, no pain with resistance to active range of motion. Heart: Regular rate and rhythm, no murmurs, rub, or gallop. Normal S1S2. Lungs:  Clear to auscultation bilaterally with equal chest expansion, no cough, no wheeze  Musculoskeletal:  Extremities revealed no edema and had full range of motion of joints. Psych:  Good mood and bright affect    NEUROLOGICAL EXAMINATION:     Mental Status:   Alert and oriented to person, place, and time with recent and remote memory intact. Attention span and concentration are normal. Speech is fluent with a full fund of knowledge. Cranial Nerves:    II, III, IV, VI:  Visual acuity grossly intact. Extra-ocular movements are full and fluid. No ptosis or nystagmus. V-XII: Hearing is grossly intact. Facial features are symmetric, with normal sensation and strength. The palate rises symmetrically and the tongue protrudes midline. Sternocleidomastoids 5/5. Motor Examination: Normal tone, bulk, and strength, 5/5 muscle strength throughout. Coordination:  Finger to nose testing was normal.   No resting or intention tremor  Gait and Station:  Steady while walking and with tandem walking. Normal arm swing. No pronator drift. No muscle wasting or fasciculations noted. ASSESSMENT AND PLAN    ICD-10-CM ICD-9-CM    1.  Chronic migraine without aura without status migrainosus, not intractable G43.709 346.70    2. Chronic tension-type headache, intractable G44.221 339.12      34year-old who was seen in follow-up for headaches. She has chronic daily tension headache, with migrainous escalation about every 3 weeks. She does not have an effective abortive strategy, so these generally last about 3 days. She has tried and failed Cymbalta, gabapentin for preventative. She tried sumatriptan for abortive, get some chest pain. Has not tried Maxalt yet. She is getting some reduction in headache frequency with dry needling. 1.  Discussed supplements that she can try for headache preventative, she will try magnesium and she was also provided with the migraine education booklet with a list of other supplements that she might want to add  2. She will try Maxalt 10 mg as needed for migraine abortive, if she still has intolerable side effects could try Amerge as it tends to have less  3. She also has Fioricet prescribed by her PCP, she can use this for abortive but discussed need to limit to no more than 2 days/week to prevent rebound  4. Continue dry needling for tension headache  5. If magnesium is ineffective, she has no triply prescription at home that she may try    Follow-up in 3 months, call sooner with concerns      Timoteo Phenes NP    This note was created using voice recognition software. Despite editing, there may be syntax errors.

## 2018-11-15 DIAGNOSIS — G43.719 INTRACTABLE CHRONIC MIGRAINE WITHOUT AURA AND WITHOUT STATUS MIGRAINOSUS: ICD-10-CM

## 2018-11-15 NOTE — TELEPHONE ENCOUNTER
Spoke with patient and informed her that her [facility] paperwork is complete and ready to be picked up. Informed her of the $25 form fee. She also stated that she was supposed to have her cyclobenzaprine 5 mg and rizatriptan 10 mg refilled. Advised will forward the request to [facility] to refill those medications. Patient was given an opportunity to ask questions, repeated information, and verbalized understanding.

## 2018-11-16 RX ORDER — CYCLOBENZAPRINE HCL 5 MG
5 TABLET ORAL
Qty: 90 TAB | Refills: 1 | Status: SHIPPED | OUTPATIENT
Start: 2018-11-16 | End: 2019-04-30 | Stop reason: SDUPTHER

## 2018-11-16 RX ORDER — RIZATRIPTAN BENZOATE 10 MG/1
10 TABLET, ORALLY DISINTEGRATING ORAL
Qty: 9 TAB | Refills: 1 | Status: SHIPPED | OUTPATIENT
Start: 2018-11-16 | End: 2018-11-16

## 2019-02-05 ENCOUNTER — TELEPHONE (OUTPATIENT)
Dept: NEUROLOGY | Age: 34
End: 2019-02-05

## 2019-02-05 DIAGNOSIS — G43.719 INTRACTABLE CHRONIC MIGRAINE WITHOUT AURA AND WITHOUT STATUS MIGRAINOSUS: Primary | ICD-10-CM

## 2019-02-05 NOTE — TELEPHONE ENCOUNTER
Needing an updated script  faxed over so pt can still do PT.  Please call back         Fax:[phone]

## 2019-02-05 NOTE — TELEPHONE ENCOUNTER
Spoke with [facility] from [place]. She stated she needs a new referral for patient to continue receiving dry needling for headaches.

## 2019-02-06 ENCOUNTER — DOCUMENTATION ONLY (OUTPATIENT)
Dept: NEUROLOGY | Age: 34
End: 2019-02-06

## 2019-02-21 ENCOUNTER — OFFICE VISIT (OUTPATIENT)
Dept: NEUROLOGY | Age: 34
End: 2019-02-21

## 2019-02-21 VITALS
RESPIRATION RATE: 18 BRPM | OXYGEN SATURATION: 98 % | WEIGHT: 144 LBS | HEART RATE: 77 BPM | BODY MASS INDEX: 23.99 KG/M2 | SYSTOLIC BLOOD PRESSURE: 120 MMHG | HEIGHT: 65 IN | DIASTOLIC BLOOD PRESSURE: 62 MMHG

## 2019-02-21 DIAGNOSIS — G43.709 CHRONIC MIGRAINE WITHOUT AURA WITHOUT STATUS MIGRAINOSUS, NOT INTRACTABLE: Primary | ICD-10-CM

## 2019-02-21 DIAGNOSIS — G44.221 CHRONIC TENSION-TYPE HEADACHE, INTRACTABLE: ICD-10-CM

## 2019-02-21 RX ORDER — ALBUTEROL SULFATE 90 UG/1
AEROSOL, METERED RESPIRATORY (INHALATION)
Refills: 2 | COMMUNITY
Start: 2018-12-14 | End: 2020-06-11 | Stop reason: SDUPTHER

## 2019-02-21 RX ORDER — EPINEPHRINE 0.3 MG/.3ML
0.3 INJECTION SUBCUTANEOUS
COMMUNITY
End: 2021-03-16 | Stop reason: SDUPTHER

## 2019-02-21 NOTE — PATIENT INSTRUCTIONS
A Healthy Lifestyle: Care Instructions  Your Care Instructions    A healthy lifestyle can help you feel good, stay at a healthy weight, and have plenty of energy for both work and play. A healthy lifestyle is something you can share with your whole family. A healthy lifestyle also can lower your risk for serious health problems, such as high blood pressure, heart disease, and diabetes. You can follow a few steps listed below to improve your health and the health of your family. Follow-up care is a key part of your treatment and safety. Be sure to make and go to all appointments, and call your doctor if you are having problems. It's also a good idea to know your test results and keep a list of the medicines you take. How can you care for yourself at home? · Do not eat too much sugar, fat, or fast foods. You can still have dessert and treats now and then. The goal is moderation. · Start small to improve your eating habits. Pay attention to portion sizes, drink less juice and soda pop, and eat more fruits and vegetables. ? Eat a healthy amount of food. A 3-ounce serving of meat, for example, is about the size of a deck of cards. Fill the rest of your plate with vegetables and whole grains. ? Limit the amount of soda and sports drinks you have every day. Drink more water when you are thirsty. ? Eat at least 5 servings of fruits and vegetables every day. It may seem like a lot, but it is not hard to reach this goal. A serving or helping is 1 piece of fruit, 1 cup of vegetables, or 2 cups of leafy, raw vegetables. Have an apple or some carrot sticks as an afternoon snack instead of a candy bar. Try to have fruits and/or vegetables at every meal.  · Make exercise part of your daily routine. You may want to start with simple activities, such as walking, bicycling, or slow swimming. Try to be active 30 to 60 minutes every day. You do not need to do all 30 to 60 minutes all at once.  For example, you can exercise 3 times a day for 10 or 20 minutes. Moderate exercise is safe for most people, but it is always a good idea to talk to your doctor before starting an exercise program.  · Keep moving. [employer] the lawn, work in the garden, or [employer]. Take the stairs instead of the elevator at work. · If you smoke, quit. People who smoke have an increased risk for heart attack, stroke, cancer, and other lung illnesses. Quitting is hard, but there are ways to boost your chance of quitting tobacco for good. ? Use nicotine gum, patches, or lozenges. ? Ask your doctor about stop-smoking programs and medicines. ? Keep trying. In addition to reducing your risk of diseases in the future, you will notice some benefits soon after you stop using tobacco. If you have shortness of breath or asthma symptoms, they will likely get better within a few weeks after you quit. · Limit how much alcohol you drink. Moderate amounts of alcohol (up to 2 drinks a day for men, 1 drink a day for women) are okay. But drinking too much can lead to liver problems, high blood pressure, and other health problems. Family health  If you have a family, there are many things you can do together to improve your health. · Eat meals together as a family as often as possible. · Eat healthy foods. This includes fruits, vegetables, lean meats and dairy, and whole grains. · Include your family in your fitness plan. Most people think of activities such as jogging or tennis as the way to fitness, but there are many ways you and your family can be more active. Anything that makes you breathe hard and gets your heart pumping is exercise. Here are some tips:  ? Walk to do errands or to take your child to school or the bus.  ? Go for a family bike ride after dinner instead of watching TV. Where can you learn more? Go to http://margaret-nikki.info/. Enter N396 in the search box to learn more about \"A Healthy Lifestyle: Care Instructions. \"  Current as of: September 11, 2018  Content Version: 11.9  © 8932-2597 [employer]. Care instructions adapted under license by [employer] (which disclaims liability or warranty for this information). If you have questions about a medical condition or this instruction, always ask your healthcare professional. [address] any warranty or liability for your use of this information.

## 2019-02-21 NOTE — PROGRESS NOTES
Date:            19     Name:  Tammie Portillo  :  1985  MRN:  [id number]     PCP:  Rannie Boeck, DO    Chief Complaint   Patient presents with    Migraine         HISTORY OF PRESENT ILLNESS:  Nayeli Guerra is a 34 y.o., female who presents today for follow up for headaches. She was last seen in November. Reluctant to try nortriptyline due to potential mood changes. Had mood changes on Cymbalta and gabapentin. Concern about side effects with another prescription preventative medication. Not a candidate for topiramate due to history of kidney stones. She used to take sumatriptan well, but after a flew shot she tried sumatriptan again and it caused chest pain. complaining of low-grade daily headache, migrainous escalation every 3 weeks or so with nausea, photo and phonophobia, requiring her to stay out of work for a few days. Did not have an effective abortive, taking Flexeril with headaches which was not helping. Had a prescription for Fioricet but had not tried it. Dry needling has been helpful with reducing headache intensity. We discussed some supplements that she could add for headache prevention, she was also encouraged to try Maxalt for abortive. She has been having allergic reactions to foods, which is triggering headaches. She thinks that most of her headaches are related to allergies. She takes magnesium now, it makes her very tired but helps her to sleep well. She is still getting dry needling. She does have a low grade headache most of the time, if she is well rested she might go without one. She sleeps poorly if she has an allergic reaction. Her pulmonologist does think that she had allergic asthma, she has some more testing to do but may start taking Xolair. She'll take an extra dose of magnesium if she has a headache, takes naproxen and goes to sleep. She is afraid to take anything else for headache because of allergic reactions.  She gets a migraine every weekend when she works 3 days in a row. Prior preventatives:  Cymbalta   Gabapentin (stopped working after [employer])    11.14.2018 polly Guillen is a 34 y.o., female who presents today for follow up for headaches. She was last seen by Dr. Teresita Bernard in March, put on nortriptyline for preventative at that time. She did not try nortriptyline, has been afraid to because of potential mood changes. She had mood changes on Cymbalta, gabapentin. She is not on preventative at this time, is still kind of reluctant to try another prescription preventative due to concern about side effects. Work stress, poor sleep trigger migraines. She tried an elimination diet, ended up cutting out dairy and still has headaches. She has a low grade daily headache, every three weeks or so she gets migrainous escalation with nausea, photo and phonophobia and she has to stay home from work for a few days. She does not really have an effective abortive plan right now. She takes flexeril when she gets a headache, it helps with the muscle tension but not the HA. She has an rx for fioricet from her PCP, has not tried it yet. She is seeing an allergist and has a lot of allergic reactions to food, medications. She has a history of frequent kidney stones, can't do topiramate. Sumatriptan caused chest pain, she has not tried maxalt as ordered by Dr. Teresita Bernard but would like to. Her mother takes Amerge, tolerates that well. She is getting dry needling, that helps prevent bigger headaches and she is having less of the headaches that keep her out of work.           Current Outpatient Medications   Medication Sig    Cetirizine (ZYRTEC) 10 mg cap Take 10 mg by mouth daily.  PROAIR HFA 90 mcg/actuation inhaler INHALE 2 PUFFS EVERY 4 HOURS AS NEEDED. MAY USE 2 PUFFS 20-30 MINUTES BEFORE PHYSICAL EXERTION    MAGNESIUM PO Take  by mouth.  EPINEPHrine (EPIPEN) 0.3 mg/0.3 mL injection 0.3 mg by IntraMUSCular route once as needed.     cyclobenzaprine (FLEXERIL) 5 mg tablet Take 1 Tab by mouth three (3) times daily as needed for Muscle Spasm(s).  famotidine (PEPCID) 10 mg tablet Take 10 mg by mouth two (2) times a day.  butalbital-acetaminophen-caffeine (FIORICET, ESGIC) -40 mg per tablet Take 1 Tab by mouth every six (6) hours as needed for Headache. No current facility-administered medications for this visit.       Allergies   Allergen Reactions    Latex Hives    Corn Anaphylaxis    Egg Anaphylaxis    Milk Anaphylaxis    Peanut Anaphylaxis    Sesame Seed Anaphylaxis    Ciprofloxacin Other (comments)    Guaifenesin Other (comments)     Gained weight    Ibuprofen Other (comments)     \"started burning really bad\"    Influenza Virus Vaccine, Specific Other (comments)     Quadrivalent   Severe HA and neurological symptoms     Oxycodone Other (comments)    Qvar [Beclomethasone Dipropionate] Hives    Shellfish Derived Rash    Sulfa (Sulfonamide Antibiotics) Hives and Other (comments)     achy    [id number]-9-To7 Antimigraine Agents Palpitations     Chest pain    Wheat Other (comments)     Mouth bleeds     Past Medical History:   Diagnosis Date    Abdominal pain, other specified site     Calculus of kidney     RIGHT    [facility]algia 06/2016    [facility]    GERD (gastroesophageal reflux disease)     Hydronephrosis     Hypoglycemia, unspecified     Irregular menstrual cycle     Lumbago     Lupus 06/2016    [facility]    Migraine with aura, without mention of intractable migraine without mention of status migrainosus     Other ill-defined conditions(799.89)     mitral valve prolapse    Sacroiliitis, not elsewhere classified ([facility] [address]      Past Surgical History:   Procedure Laterality Date    HX ADENOIDECTOMY      HX TONSILLECTOMY      AR COLONOSCOPY FLX DX W/COLLJ SPEC WHEN PFRMD  4/7/2014         AR EGD TRANSORAL BIOPSY SINGLE/MULTIPLE  4/7/2014          Social History     Socioeconomic History    Marital status: SINGLE     Spouse name: Not on file    Number of children: 0    Years of education: Not on file    Highest education level: Not on file   Social Needs    Financial resource strain: Not on file    Food insecurity - worry: Not on file    Food insecurity - inability: Not on file    Transportation needs - medical: Not on file   [employer] needs - non-medical: Not on file   Occupational History     Employer: [employer]   Tobacco Use    Smoking status: Never Smoker    Smokeless tobacco: Never Used   Substance and Sexual Activity    Alcohol use: No    Drug use: No    Sexual activity: Yes     Partners: Male     Birth control/protection: Condom   Other Topics Concern    Not on file   Social History Narrative    Not on file     Family History   Problem Relation Age of Onset    Cancer Mother     Diabetes Mother     Arthritis-osteo Mother     Thyroid Disease Mother     Migraines Mother     Seizures Mother     Diabetes Maternal Grandmother     Rashes/Skin Problems Maternal Grandmother     Diabetes Maternal Grandfather     Heart Disease Maternal Grandfather     Heart Attack Maternal Grandfather     Hypertension Maternal Grandfather     High Cholesterol Maternal Grandfather     Stroke Maternal Grandfather          PHYSICAL EXAMINATION:    Visit Vitals  /62   Pulse 77   Resp 18   Ht 5' 5\" (1.651 m)   Wt 65.3 kg (144 lb)   SpO2 98%   BMI 23.96 kg/m²     General:  Well defined, nourished, and groomed individual in no acute distress. Neck: Supple, nontender, no bruits. Heart: Regular rate and rhythm, no murmurs, rub, or gallop. Normal S1S2. Lungs:  Clear to auscultation bilaterally with equal chest expansion, no cough, no wheeze  Musculoskeletal:  Extremities revealed no edema and had full range of motion of joints.     Psych:  Good mood and bright affect    NEUROLOGICAL EXAMINATION:     Mental Status:   Alert and oriented to person, place, and time with recent and remote memory intact. Attention span and concentration are normal. Speech is fluent with a full fund of knowledge. Cranial Nerves:    II, III, IV, VI:  Visual acuity grossly intact. Extra-ocular movements are full and fluid. No ptosis or nystagmus. V-XII: Hearing is grossly intact. Facial features are symmetric, with normal sensation and strength. The palate rises symmetrically and the tongue protrudes midline. Sternocleidomastoids 5/5. Motor Examination: Normal tone, bulk, and strength, 5/5 muscle strength throughout. Coordination:  Finger to nose testing was normal.   No resting or intention tremor  Gait and Station:  Steady while walking. Normal arm swing. No pronator drift. No muscle wasting or fasciculations noted. ASSESSMENT AND PLAN    ICD-10-CM ICD-9-CM    1. Chronic migraine without aura without status migrainosus, not intractable G43.709 346.70    2. Chronic tension-type headache, intractable G44.221 339.12      34year-old feel seen in follow-up for headaches. She has near daily tension headache with occasional migrainous escalation. Unfortunately she has a lot of food allergies that may be triggering headaches. She also has had a lot of bad drug reactions in the past, is reluctant to try any more prescriptions. Magnesium is somewhat helpful for preventative. She is afraid to try another triptan because of a bad reaction to sumatriptan in the past, but may try Maxalt and has that at home already. 1.  Continue magnesium for migraine preventative, could also add B2 supplement  2. Encouraged her to try Maxalt for abortive when she is with a family member who can monitor for reaction  3. Continue to follow with allergist and pulmonologist as allergies and asthma may be a trigger for her frequent headaches    Follow-up in 3 months, call sooner with concerns      Cristian Nix NP    This note was created using voice recognition software.  Despite editing, there may be syntax errors.

## 2019-02-21 NOTE — PROGRESS NOTES
Patient here for follow up on migraines. She reported she is still having at least one migraine per week.

## 2019-02-25 ENCOUNTER — HOSPITAL ENCOUNTER (OUTPATIENT)
Dept: GENERAL RADIOLOGY | Age: 34
Discharge: HOME | End: 2019-02-25
Payer: COMMERCIAL

## 2019-02-25 ENCOUNTER — TELEPHONE (OUTPATIENT)
Dept: INTERNAL MEDICINE CLINIC | Age: 34
End: 2019-02-25

## 2019-02-25 DIAGNOSIS — R05.9 COUGH: ICD-10-CM

## 2019-02-25 PROCEDURE — 71046 X-RAY EXAM CHEST 2 VIEWS: CPT

## 2019-02-25 NOTE — TELEPHONE ENCOUNTER
#[phone] pt has been trying to garsia this cold/flu on her own since early last week. She has been to urgent care and the steroids are not working. She has such a cough and brings up dark, thick mucus. Pt wants to be seen as soon as possible. She did not want to see Dr. Julio Talamantes tomorrow. Please call to schedule pt in. Thanks. Pt does sound bad.

## 2019-02-25 NOTE — TELEPHONE ENCOUNTER
Spoke with patient after 2 patient identifiers being note and advised that she was able to se the pulmonologist and he sent her to [facility] for CXR. Patient expressed understanding and has no further questions at this time.

## 2019-04-30 ENCOUNTER — OFFICE VISIT (OUTPATIENT)
Dept: NEUROLOGY | Age: 34
End: 2019-04-30

## 2019-04-30 VITALS
RESPIRATION RATE: 16 BRPM | WEIGHT: 145 LBS | OXYGEN SATURATION: 98 % | SYSTOLIC BLOOD PRESSURE: 116 MMHG | HEIGHT: 65 IN | BODY MASS INDEX: 24.16 KG/M2 | DIASTOLIC BLOOD PRESSURE: 64 MMHG | HEART RATE: 79 BPM

## 2019-04-30 DIAGNOSIS — G43.719 INTRACTABLE CHRONIC MIGRAINE WITHOUT AURA AND WITHOUT STATUS MIGRAINOSUS: Primary | ICD-10-CM

## 2019-04-30 DIAGNOSIS — M79.7 FIBROMYALGIA: ICD-10-CM

## 2019-04-30 DIAGNOSIS — E55.9 VITAMIN D DEFICIENCY: ICD-10-CM

## 2019-04-30 DIAGNOSIS — E03.4 HYPOTHYROIDISM DUE TO ACQUIRED ATROPHY OF THYROID: ICD-10-CM

## 2019-04-30 RX ORDER — CYCLOBENZAPRINE HCL 5 MG
5 TABLET ORAL
Qty: 90 TAB | Refills: 1 | Status: SHIPPED | OUTPATIENT
Start: 2019-04-30 | End: 2020-06-11

## 2019-04-30 RX ORDER — MULTIVIT WITH MINERALS/HERBS
1 TABLET ORAL DAILY
COMMUNITY

## 2019-04-30 RX ORDER — CHOLECALCIFEROL (VITAMIN D3) 125 MCG
300 CAPSULE ORAL DAILY
COMMUNITY
End: 2021-06-24 | Stop reason: ALTCHOICE

## 2019-04-30 RX ORDER — BUTALBITAL, ACETAMINOPHEN AND CAFFEINE 50; 325; 40 MG/1; MG/1; MG/1
1 TABLET ORAL
Qty: 30 TAB | Refills: 5 | Status: SHIPPED | OUTPATIENT
Start: 2019-04-30 | End: 2021-06-24 | Stop reason: ALTCHOICE

## 2019-04-30 NOTE — PATIENT INSTRUCTIONS
A Healthy Lifestyle: Care Instructions Your Care Instructions A healthy lifestyle can help you feel good, stay at a healthy weight, and have plenty of energy for both work and play. A healthy lifestyle is something you can share with your whole family. A healthy lifestyle also can lower your risk for serious health problems, such as high blood pressure, heart disease, and diabetes. You can follow a few steps listed below to improve your health and the health of your family. Follow-up care is a key part of your treatment and safety. Be sure to make and go to all appointments, and call your doctor if you are having problems. It's also a good idea to know your test results and keep a list of the medicines you take. How can you care for yourself at home? · Do not eat too much sugar, fat, or fast foods. You can still have dessert and treats now and then. The goal is moderation. · Start small to improve your eating habits. Pay attention to portion sizes, drink less juice and soda pop, and eat more fruits and vegetables. ? Eat a healthy amount of food. A 3-ounce serving of meat, for example, is about the size of a deck of cards. Fill the rest of your plate with vegetables and whole grains. ? Limit the amount of soda and sports drinks you have every day. Drink more water when you are thirsty. ? Eat at least 5 servings of fruits and vegetables every day. It may seem like a lot, but it is not hard to reach this goal. A serving or helping is 1 piece of fruit, 1 cup of vegetables, or 2 cups of leafy, raw vegetables. Have an apple or some carrot sticks as an afternoon snack instead of a candy bar. Try to have fruits and/or vegetables at every meal. 
· Make exercise part of your daily routine. You may want to start with simple activities, such as walking, bicycling, or slow swimming. Try to be active 30 to 60 minutes every day.  You do not need to do all 30 to 60 minutes all at once. For example, you can exercise 3 times a day for 10 or 20 minutes. Moderate exercise is safe for most people, but it is always a good idea to talk to your doctor before starting an exercise program. 
· Keep moving. Kalie Knuckles the lawn, work in the garden, or [place]. Take the stairs instead of the elevator at work. · If you smoke, quit. People who smoke have an increased risk for heart attack, stroke, cancer, and other lung illnesses. Quitting is hard, but there are ways to boost your chance of quitting tobacco for good. ? Use nicotine gum, patches, or lozenges. ? Ask your doctor about stop-smoking programs and medicines. ? Keep trying. In addition to reducing your risk of diseases in the future, you will notice some benefits soon after you stop using tobacco. If you have shortness of breath or asthma symptoms, they will likely get better within a few weeks after you quit. · Limit how much alcohol you drink. Moderate amounts of alcohol (up to 2 drinks a day for men, 1 drink a day for women) are okay. But drinking too much can lead to liver problems, high blood pressure, and other health problems. Family health If you have a family, there are many things you can do together to improve your health. · Eat meals together as a family as often as possible. · Eat healthy foods. This includes fruits, vegetables, lean meats and dairy, and whole grains. · Include your family in your fitness plan. Most people think of activities such as jogging or tennis as the way to fitness, but there are many ways you and your family can be more active. Anything that makes you breathe hard and gets your heart pumping is exercise. Here are some tips: 
? Walk to do errands or to take your child to school or the bus. 
? Go for a family bike ride after dinner instead of watching TV. Where can you learn more? Go to http://margaret-nkiki.info/. Enter N222 in the search box to learn more about \"A Healthy Lifestyle: Care Instructions. \" Current as of: September 11, 2018 Content Version: 11.9 © 5150-7108 [employer]. Care instructions adapted under license by [employer] (which disclaims liability or warranty for this information). If you have questions about a medical condition or this instruction, always ask your healthcare professional. [address] any warranty or liability for your use of this information. Office Policieso Phone calls/patient messages: Please allow up to 24 hours for someone in the office to contact you about your call or message. Be mindful your provider may be out of the office or your message may require further review. We encourage you to use [employer] for your messages as this is a faster, more efficient way to communicate with our office 
o Medication Refills: 
Prescription medications require up to 48 business hours to process. We encourage you to use [facility] for your refills. For controlled medications: Please allow up to 72 business hours to process. Certain medications may require you to  a written prescription at our office. NO narcotic/controlled medications will be prescribed after 4pm Monday through Friday or on weekends 
o Form/Paperwork Completion: We ask that you allow 7-14 business days. You may also download your forms to [employer] to have your doctor print off.

## 2019-04-30 NOTE — LETTER
4/30/2019 8:53 PM 
 
Patient:  Isabelle Nunez YOB: 1985 Date of Visit: 4/30/2019 Dear No Recipients: Thank you for referring Ms. Lisette Seay to me for evaluation/treatment. Below are the relevant portions of my assessment and plan of care. Consult REFERRED BY: 
Camille MOSLEY III, DO 
 
CHIEF COMPLAINT: Intractable headaches. Subjective:  
 
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 Reginaldo Flannery of new problem of progressive headaches that occur 2-3 times a week, and are described as being more right-sided, severe with throbbing pain, nausea, occasional vomiting, phonophobia and photophobia, dizziness and vertigo and occasional blurred vision in her eyes. She had a normal MRI of the brain in 2014 and one prior to that also. Her migraine sometimes the last 3 to 4 days, and she had to miss 2 days of work last week because of severe migraine. She cannot take Cymbalta, nortriptyline, gabapentin, and cannot take Topamax or zoniphimide because of history of kidney stones. She has had repeated occipital nerve blocks and injections to help her headaches without improvement. Dry needling has helped some as does chiropractic therapy. She has a strong history of allergies, and is intolerant of a lot of medication. She has fibromyalgia but no longer has a diagnosis of lupus. She uses Fioricet as needed for the headaches and has found that magnesium oxide 4 mg once a day also helps the headaches but she still having frequent headaches. We discussed the new CGRP inhibitors, but she does not want to try those because of her history of allergies. We mentioned coenzyme Q 10 and she is willing to try that at 300 mg dose once a day in addition to the magnesium oxide.   She has a slight tremor in her hands, and complains of fatigue and lack of energy and like her thyroid rechecked to rule out TPO syndrome and wanted her B12 and vitamin D checked because she was low in the past and cannot take vitamin D now because of the kidney stones. She does find the Flexeril helps with the headaches and she takes Aleve on a as needed basis. She has many allergies and COPD in addition. She is under the care of a pulmonologist Dr. Charlotte Carmichael. He cannot take beta-blockers because of her history of COPD and asthma. We reviewed all of these problems and her medical records on the chart today, reviewed her MRI scans, reviewed her previous imaging, reviewed all the previous neurology notes, and because of her markedly difficult problem, we do not have many treatment options available. We mention trying Depakote but she preferred just to try that coenzyme Q 10 first and continue her Fioricet and Flexeril for the rest of her medical problems are being evaluated. She is had no recent fever, meningismus, head trauma, or focal weakness sensory loss or other focal neurologic deficits. Past Medical History:  
Diagnosis Date  Abdominal pain, other specified site  Calculus of kidney RIGHT  Fibromyalgia 06/2016 [facility]  GERD (gastroesophageal reflux disease)  Hydronephrosis  Hypoglycemia, unspecified  Irregular menstrual cycle  Lumbago  Lupus ([facility] [address] 06/2016 [facility]  Migraine with aura, without mention of intractable migraine without mention of status migrainosus  Other ill-defined conditions(799.89)   
 mitral valve prolapse  Sacroiliitis, not elsewhere classified ([facility] [address] Past Surgical History:  
Procedure Laterality Date  HX ADENOIDECTOMY  HX TONSILLECTOMY  CA COLONOSCOPY FLX DX W/COLLJ SPEC WHEN PFRMD  4/7/2014  CA EGD TRANSORAL BIOPSY SINGLE/MULTIPLE  4/7/2014 Family History Problem Relation Age of Onset  Cancer Mother  Diabetes Mother Jayy Montano Arthritis-osteo Mother  Thyroid Disease Mother  Migraines Mother  Seizures Mother  Diabetes Maternal Grandmother  Rashes/Skin Problems Maternal Grandmother  Diabetes Maternal Grandfather  Heart Disease Maternal Grandfather  Heart Attack Maternal Grandfather  Hypertension Maternal Grandfather  High Cholesterol Maternal Grandfather  Stroke Maternal Grandfather Social History Tobacco Use  Smoking status: Never Smoker  Smokeless tobacco: Never Used Substance Use Topics  Alcohol use: No  
   
 
Current Outpatient Medications:  
  pyridoxine HCl, vitamin B6, (VITAMIN B-6 PO), Take  by mouth daily. , Disp: , Rfl:  
  b complex vitamins tablet, Take 1 Tab by mouth daily. , Disp: , Rfl:  
  cyclobenzaprine (FLEXERIL) 5 mg tablet, Take 1 Tab by mouth three (3) times daily as needed for Muscle Spasm(s). , Disp: 90 Tab, Rfl: 1 
  butalbital-acetaminophen-caffeine (FIORICET, ESGIC) -40 mg per tablet, Take 1 Tab by mouth every six (6) hours as needed for Headache., Disp: 30 Tab, Rfl: 5 
  co-enzyme Q-10 (CO Q-10) 100 mg capsule, Take 300 mg by mouth daily. , Disp: , Rfl:  
  Cetirizine (ZYRTEC) 10 mg cap, Take 10 mg by mouth daily. , Disp: , Rfl:  
  PROAIR HFA 90 mcg/actuation inhaler, INHALE 2 PUFFS EVERY 4 HOURS AS NEEDED. MAY USE 2 PUFFS 20-30 MINUTES BEFORE PHYSICAL EXERTION, Disp: , Rfl: 2 
  MAGNESIUM PO, Take  by mouth., Disp: , Rfl:  
  EPINEPHrine (EPIPEN) 0.3 mg/0.3 mL injection, 0.3 mg by IntraMUSCular route once as needed. , Disp: , Rfl:  
  famotidine (PEPCID) 10 mg tablet, Take 10 mg by mouth two (2) times a day., Disp: , Rfl:  
 
 
 
Allergies Allergen Reactions  Latex Hives  Corn Anaphylaxis  Egg Anaphylaxis  Milk Anaphylaxis  Peanut Anaphylaxis  Sesame Seed Anaphylaxis  Ciprofloxacin Other (comments)  Guaifenesin Other (comments) Gained weight  Ibuprofen Other (comments) \"started burning really bad\"  Influenza Virus Vaccine, Specific Other (comments) Quadrivalent Severe HA and neurological symptoms  Oxycodone Other (comments)  Qvar [Beclomethasone Dipropionate] Hives  Shellfish Derived Rash  Sulfa (Sulfonamide Antibiotics) Hives and Other (comments)  
  achy  [id number] Antimigraine Agents Palpitations Chest pain  Wheat Other (comments) Mouth bleeds MRI Results (most recent): 
Results from Hospital Encounter encounter on 10/17/14 MRI BRAIN WO CONT Narrative **Final Report** 
  
 
ICD Codes / Adm. Diagnosis: 784.0  286396 / Headache Examination:  MR BRAIN WO CON  - [id number] - Oct 17 2014 11:50AM 
Accession No:  [id number] Reason:  Refractory Headache REPORT: 
HISTORY:  Blurred vision, right-sided headache COMPARISON:  October 2, 2014 and March 18, 2011 TECHNIQUE:  MR imaging of the brain was performed with sagittal T1, axial  
T1, T2, FLAIR, GRE, DWI/ADC, coronal T2. 
 
FINDINGS: 
 
The ventricles and cisterns are of normal size and configuration. There are  
no extra-axial fluid collections, mass lesions or mass effect. There is no  
significant white matter disease. There is no acute infarction. There is no  
acute or chronic intracranial hemorrhage. The major intracranial vascular  
flow-voids are patent. Marrow signal is normal. 
   
 
IMPRESSION: 
1. No acute intracranial abnormality Signing/Reading Doctor: Marie CABALLERO ([id number]) Devika Best ([id number])  Oct 17 2014 12:25PM                      
 
 
   
 
 
Results from Hospital Encounter encounter on 10/17/14 MRI BRAIN WO CONT Narrative **Final Report** 
  
 
ICD Codes / Adm. Diagnosis: 784.0  007476 / Headache Examination:  MR BRAIN WO CON  - [id number] - Oct 17 2014 11:50AM 
Accession No:  [id number] Reason:  Refractory Headache REPORT: 
HISTORY:  Blurred vision, right-sided headache COMPARISON:  October 2, 2014 and March 18, 2011 TECHNIQUE:  MR imaging of the brain was performed with sagittal T1, axial  
T1, T2, FLAIR, GRE, DWI/ADC, coronal T2. 
 
FINDINGS: 
 
The ventricles and cisterns are of normal size and configuration. There are  
no extra-axial fluid collections, mass lesions or mass effect. There is no  
significant white matter disease. There is no acute infarction. There is no  
acute or chronic intracranial hemorrhage. The major intracranial vascular  
flow-voids are patent. Marrow signal is normal. 
   
 
IMPRESSION: 
1. No acute intracranial abnormality Signing/Reading Doctor: Marie CABALLERO ([id number]) Devika Best ([id number])  Oct 17 2014 12:25PM                      
 
 
   
 
Review of Systems: A comprehensive review of systems was negative except for: Constitutional: positive for sweats, fatigue and malaise Eyes: positive for visual disturbance Respiratory: positive for cough, asthma, wheezing, dyspnea on exertion, chronic bronchitis or Shortness of breath Cardiovascular: positive for chest pressure/discomfort, palpitations, near-syncope, dyspnea on exertion, dizziness Gastrointestinal: positive for dyspepsia, nausea, vomiting, constipation and abdominal pain Musculoskeletal: positive for myalgias, arthralgias and stiff joints Neurological: positive for headaches and weakness Behvioral/Psych: positive for anxiety and depression Vitals:  
 04/30/19 1002 BP: 116/64 Pulse: 79 Resp: 16 SpO2: 98% Weight: 145 lb (65.8 kg) Height: 5' 5\" (1.651 m) Objective: I 
 
 
NEUROLOGICAL EXAM: 
 
Appearance: The patient is well developed, well nourished, provides a coherent history and is in no acute distress. Mental Status: Oriented to time, place and person, and the president, cognitive function is normal and speech is fluent and no aphasia or dysarthria. Mood and affect appropriate a bit anxious. Cranial Nerves:   Intact visual fields.  Fundi are benign, disc are flat, no lesions seen on funduscopy. TAMIKA, EOM's full, no nystagmus, no ptosis. Facial sensation is normal. Corneal reflexes are not tested. Facial movement is symmetric. Hearing is normal bilaterally. Palate is midline with normal sternocleidomastoid and trapezius muscles are normal. Tongue is midline. Neck without meningismus or bruits Temporal arteries are not tender or enlarged TMJ areas are not tender on palpation Motor:  5/5 strength in upper and lower proximal and distal muscles. Normal bulk and tone. No fasciculations. Rapid alternating movement is symmetric and intact bilaterally Muscles have some generalized tightness. Reflexes:   Deep tendon reflexes 2+/4 and symmetrical. 
No babinski or clonus present Sensory:   Normal to touch, pinprick and vibration and temperature. DSS is intact Gait:  Normal gait for patient's age. Tremor:   No tremor noted. Cerebellar:  No abnormal cerebellar signs present on Romberg and tandem testing and finger-nose-finger exam.  
Neurovascular:  Normal heart sounds and regular rhythm, peripheral pulses intact, and no carotid bruits. Assessment: ICD-10-CM ICD-9-CM 1. Intractable chronic migraine without aura and without status migrainosus G43.719 346.71 cyclobenzaprine (FLEXERIL) 5 mg tablet  
   butalbital-acetaminophen-caffeine (FIORICET, ESGIC) -40 mg per tablet SED RATE (ESR) CK MAGNESIUM  
   VITAMIN D, 25 HYDROXY  
   T3 TOTAL T4, FREE FERRITIN  
   CBC WITH AUTOMATED DIFF  
   METABOLIC PANEL, COMPREHENSIVE 2. Hypothyroidism due to acquired atrophy of thyroid E03.4 244.8 cyclobenzaprine (FLEXERIL) 5 mg tablet 246.8 butalbital-acetaminophen-caffeine (FIORICET, ESGIC) -40 mg per tablet SED RATE (ESR) CK MAGNESIUM  
   VITAMIN D, 25 HYDROXY  
   T3 TOTAL T4, FREE    FERRITIN  
   CBC WITH AUTOMATED DIFF  
   METABOLIC PANEL, COMPREHENSIVE  
 3. Fibromyalgia M79.7 729.1 cyclobenzaprine (FLEXERIL) 5 mg tablet  
   butalbital-acetaminophen-caffeine (FIORICET, ESGIC) -40 mg per tablet SED RATE (ESR) CK MAGNESIUM  
   VITAMIN D, 25 HYDROXY  
   T3 TOTAL T4, FREE FERRITIN  
   CBC WITH AUTOMATED DIFF  
   METABOLIC PANEL, COMPREHENSIVE 4. Vitamin D deficiency E55.9 268.9 cyclobenzaprine (FLEXERIL) 5 mg tablet  
   butalbital-acetaminophen-caffeine (FIORICET, ESGIC) -40 mg per tablet SED RATE (ESR) CK MAGNESIUM  
   VITAMIN D, 25 HYDROXY  
   T3 TOTAL T4, FREE FERRITIN  
   CBC WITH AUTOMATED DIFF  
   METABOLIC PANEL, COMPREHENSIVE Active Problems: * No active hospital problems. * 
 
 
Plan:  
 
Patient with marked allergies and hypersensitivity to numerous medications, and previous failure on numerous preventive medications including gabapentin, Cymbalta, nortriptyline, and unable to take beta-blockers, and already on magnesium and being tried on coenzyme Q 10 as a new therapy We offered her CGRP inhibitors or possible Botox but she does not want any injections with her history of allergies. Very difficult case, and 45 minutes, the patient going over her past history reviewing all her records going over all her medications, discussing therapeutic options, reviewing her previous test, metabolic female with the plan as above, and patient agrees to follow this plan. Check her thyroid because of slight tremor occasionally, sensation of weakness, and also check her B12 and vitamin D because of her weakness, and muscle pain, check sed rate and other metabolic parameters in view of all of her symptoms. Follow-up in 6 months time or earlier as need be patient call back with any problem. Signed By: Antony Reyes MD   
 April 30, 2019 CC: [place] Chung DO 
FAX: [phone] This note will not be viewable in [address] If you have questions, please do not hesitate to call me. I look forward to following Ms. Acosta along with you. Sincerely, Sameer Jefferson MD

## 2019-05-01 LAB
25(OH)D3+25(OH)D2 SERPL-MCNC: 30 NG/ML (ref 30–100)
ALBUMIN SERPL-MCNC: 4.8 G/DL (ref 3.5–5.5)
ALBUMIN/GLOB SERPL: 1.7 {RATIO} (ref 1.2–2.2)
ALP SERPL-CCNC: 91 IU/L (ref 39–117)
ALT SERPL-CCNC: 16 IU/L (ref 0–32)
AST SERPL-CCNC: 15 IU/L (ref 0–40)
BASOPHILS # BLD AUTO: 0 X10E3/UL (ref 0–0.2)
BASOPHILS NFR BLD AUTO: 0 %
BILIRUB SERPL-MCNC: 0.4 MG/DL (ref 0–1.2)
BUN SERPL-MCNC: 11 MG/DL (ref 6–20)
BUN/CREAT SERPL: 16 (ref 9–23)
CALCIUM SERPL-MCNC: 9.5 MG/DL (ref 8.7–10.2)
CHLORIDE SERPL-SCNC: 103 MMOL/L (ref 96–106)
CK SERPL-CCNC: 37 U/L (ref 24–173)
CO2 SERPL-SCNC: 26 MMOL/L (ref 20–29)
CREAT SERPL-MCNC: 0.68 MG/DL (ref 0.57–1)
EOSINOPHIL # BLD AUTO: 0.1 X10E3/UL (ref 0–0.4)
EOSINOPHIL NFR BLD AUTO: 2 %
ERYTHROCYTE [DISTWIDTH] IN BLOOD BY AUTOMATED COUNT: 13.4 % (ref 12.3–15.4)
ERYTHROCYTE [SEDIMENTATION RATE] IN BLOOD BY WESTERGREN METHOD: 7 MM/HR (ref 0–32)
FERRITIN SERPL-MCNC: 68 NG/ML (ref 15–150)
GLOBULIN SER CALC-MCNC: 2.9 G/DL (ref 1.5–4.5)
GLUCOSE SERPL-MCNC: 79 MG/DL (ref 65–99)
HCT VFR BLD AUTO: 41.4 % (ref 34–46.6)
HGB BLD-MCNC: 13.3 G/DL (ref 11.1–15.9)
IMM GRANULOCYTES # BLD AUTO: 0 X10E3/UL (ref 0–0.1)
IMM GRANULOCYTES NFR BLD AUTO: 0 %
LYMPHOCYTES # BLD AUTO: 1.3 X10E3/UL (ref 0.7–3.1)
LYMPHOCYTES NFR BLD AUTO: 25 %
MAGNESIUM SERPL-MCNC: 2.2 MG/DL (ref 1.6–2.3)
MCH RBC QN AUTO: 30.9 PG (ref 26.6–33)
MCHC RBC AUTO-ENTMCNC: 32.1 G/DL (ref 31.5–35.7)
MCV RBC AUTO: 96 FL (ref 79–97)
MONOCYTES # BLD AUTO: 0.8 X10E3/UL (ref 0.1–0.9)
MONOCYTES NFR BLD AUTO: 16 %
NEUTROPHILS # BLD AUTO: 3 X10E3/UL (ref 1.4–7)
NEUTROPHILS NFR BLD AUTO: 57 %
PLATELET # BLD AUTO: 214 X10E3/UL (ref 150–379)
POTASSIUM SERPL-SCNC: 4.3 MMOL/L (ref 3.5–5.2)
PROT SERPL-MCNC: 7.7 G/DL (ref 6–8.5)
RBC # BLD AUTO: 4.31 X10E6/UL (ref 3.77–5.28)
SODIUM SERPL-SCNC: 141 MMOL/L (ref 134–144)
T3 SERPL-MCNC: 137 NG/DL (ref 71–180)
T4 FREE SERPL-MCNC: 0.92 NG/DL (ref 0.82–1.77)
WBC # BLD AUTO: 5.3 X10E3/UL (ref 3.4–10.8)

## 2019-05-01 NOTE — PROGRESS NOTES
Consult REFERRED BY: 
Rual MOSLEY III, DO 
 
CHIEF COMPLAINT: Intractable headaches. Subjective:  
 
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 Gene Victor of new problem of progressive headaches that occur 2-3 times a week, and are described as being more right-sided, severe with throbbing pain, nausea, occasional vomiting, phonophobia and photophobia, dizziness and vertigo and occasional blurred vision in her eyes. She had a normal MRI of the brain in 2014 and one prior to that also. Her migraine sometimes the last 3 to 4 days, and she had to miss 2 days of work last week because of severe migraine. She cannot take Cymbalta, nortriptyline, gabapentin, and cannot take Topamax or zoniphimide because of history of kidney stones. She has had repeated occipital nerve blocks and injections to help her headaches without improvement. Dry needling has helped some as does chiropractic therapy. She has a strong history of allergies, and is intolerant of a lot of medication. She has fibromyalgia but no longer has a diagnosis of lupus. She uses Fioricet as needed for the headaches and has found that magnesium oxide 4 mg once a day also helps the headaches but she still having frequent headaches. We discussed the new CGRP inhibitors, but she does not want to try those because of her history of allergies. We mentioned coenzyme Q 10 and she is willing to try that at 300 mg dose once a day in addition to the magnesium oxide. She has a slight tremor in her hands, and complains of fatigue and lack of energy and like her thyroid rechecked to rule out TPO syndrome and wanted her B12 and vitamin D checked because she was low in the past and cannot take vitamin D now because of the kidney stones. She does find the Flexeril helps with the headaches and she takes Aleve on a as needed basis.   She has many allergies and COPD in addition. She is under the care of a pulmonologist Dr. Meka Romero. He cannot take beta-blockers because of her history of COPD and asthma. We reviewed all of these problems and her medical records on the chart today, reviewed her MRI scans, reviewed her previous imaging, reviewed all the previous neurology notes, and because of her markedly difficult problem, we do not have many treatment options available. We mention trying Depakote but she preferred just to try that coenzyme Q 10 first and continue her Fioricet and Flexeril for the rest of her medical problems are being evaluated. She is had no recent fever, meningismus, head trauma, or focal weakness sensory loss or other focal neurologic deficits. Past Medical History:  
Diagnosis Date  Abdominal pain, other specified site  Calculus of kidney RIGHT  Fibromyalgia 06/2016 [facility]  GERD (gastroesophageal reflux disease)  Hydronephrosis  Hypoglycemia, unspecified  Irregular menstrual cycle  Lumbago  Lupus ([facility] [address] 06/2016 [facility]  Migraine with aura, without mention of intractable migraine without mention of status migrainosus  Other ill-defined conditions(799.89)   
 mitral valve prolapse  Sacroiliitis, not elsewhere classified ([facility] [address] Past Surgical History:  
Procedure Laterality Date  HX ADENOIDECTOMY  HX TONSILLECTOMY  MS COLONOSCOPY FLX DX W/COLLJ SPEC WHEN PFRMD  4/7/2014  MS EGD TRANSORAL BIOPSY SINGLE/MULTIPLE  4/7/2014 Family History Problem Relation Age of Onset  Cancer Mother  Diabetes Mother [facility] Arthritis-osteo Mother  Thyroid Disease Mother  Migraines Mother  Seizures Mother  Diabetes Maternal Grandmother  Rashes/Skin Problems Maternal Grandmother  Diabetes Maternal Grandfather  Heart Disease Maternal Grandfather  Heart Attack Maternal Grandfather  Hypertension Maternal Grandfather  High Cholesterol Maternal Grandfather  Stroke Maternal Grandfather Social History Tobacco Use  Smoking status: Never Smoker  Smokeless tobacco: Never Used Substance Use Topics  Alcohol use: No  
   
 
Current Outpatient Medications:  
  pyridoxine HCl, vitamin B6, (VITAMIN B-6 PO), Take  by mouth daily. , Disp: , Rfl:  
  b complex vitamins tablet, Take 1 Tab by mouth daily. , Disp: , Rfl:  
  cyclobenzaprine (FLEXERIL) 5 mg tablet, Take 1 Tab by mouth three (3) times daily as needed for Muscle Spasm(s). , Disp: 90 Tab, Rfl: 1 
  butalbital-acetaminophen-caffeine (FIORICET, ESGIC) -40 mg per tablet, Take 1 Tab by mouth every six (6) hours as needed for Headache., Disp: 30 Tab, Rfl: 5 
  co-enzyme Q-10 (CO Q-10) 100 mg capsule, Take 300 mg by mouth daily. , Disp: , Rfl:  
  Cetirizine (ZYRTEC) 10 mg cap, Take 10 mg by mouth daily. , Disp: , Rfl:  
  PROAIR HFA 90 mcg/actuation inhaler, INHALE 2 PUFFS EVERY 4 HOURS AS NEEDED. MAY USE 2 PUFFS 20-30 MINUTES BEFORE PHYSICAL EXERTION, Disp: , Rfl: 2 
  MAGNESIUM PO, Take  by mouth., Disp: , Rfl:  
  EPINEPHrine (EPIPEN) 0.3 mg/0.3 mL injection, 0.3 mg by IntraMUSCular route once as needed. , Disp: , Rfl:  
  famotidine (PEPCID) 10 mg tablet, Take 10 mg by mouth two (2) times a day., Disp: , Rfl:  
 
 
 
Allergies Allergen Reactions  Latex Hives  Corn Anaphylaxis  Egg Anaphylaxis  Milk Anaphylaxis  Peanut Anaphylaxis  Sesame Seed Anaphylaxis  Ciprofloxacin Other (comments)  Guaifenesin Other (comments) Gained weight  Ibuprofen Other (comments) \"started burning really bad\"  Influenza Virus Vaccine, Specific Other (comments) Quadrivalent Severe HA and neurological symptoms  Oxycodone Other (comments)  Qvar [Beclomethasone Dipropionate] Hives  Shellfish Derived Rash  Sulfa (Sulfonamide Antibiotics) Hives and Other (comments)  
  achy  [id number] Antimigraine Agents Palpitations Chest pain  Wheat Other (comments) Mouth bleeds MRI Results (most recent): 
Results from Hospital Encounter encounter on 10/17/14 MRI BRAIN WO CONT Narrative **Final Report** 
  
 
ICD Codes / Adm. Diagnosis: 784.0  256810 / Headache Examination:  MR BRAIN WO CON  - [id number] - Oct 17 2014 11:50AM 
Accession No:  [id number] Reason:  Refractory Headache REPORT: 
HISTORY:  Blurred vision, right-sided headache COMPARISON:  October 2, 2014 and March 18, 2011 TECHNIQUE:  MR imaging of the brain was performed with sagittal T1, axial  
T1, T2, FLAIR, GRE, DWI/ADC, coronal T2. 
 
FINDINGS: 
 
The ventricles and cisterns are of normal size and configuration. There are  
no extra-axial fluid collections, mass lesions or mass effect. There is no  
significant white matter disease. There is no acute infarction. There is no  
acute or chronic intracranial hemorrhage. The major intracranial vascular  
flow-voids are patent. Marrow signal is normal. 
   
 
IMPRESSION: 
1. No acute intracranial abnormality Signing/Reading Doctor: David CABALLERO ([id number]) [id number]  Oct 17 2014 12:25PM                      
 
 
   
 
 
Results from Hospital Encounter encounter on 10/17/14 MRI BRAIN WO CONT Narrative **Final Report** 
  
 
ICD Codes / Adm. Diagnosis: 784.0  231590 / Headache Examination:  MR BRAIN WO CON  - [id number] - Oct 17 2014 11:50AM 
Accession No:  [id number] Reason:  Refractory Headache REPORT: 
HISTORY:  Blurred vision, right-sided headache COMPARISON:  October 2, 2014 and March 18, 2011 TECHNIQUE:  MR imaging of the brain was performed with sagittal T1, axial  
T1, T2, FLAIR, GRE, DWI/ADC, coronal T2. 
 
FINDINGS: 
 
The ventricles and cisterns are of normal size and configuration. There are  
no extra-axial fluid collections, mass lesions or mass effect. There is no significant white matter disease. There is no acute infarction. There is no  
acute or chronic intracranial hemorrhage. The major intracranial vascular  
flow-voids are patent. Marrow signal is normal. 
   
 
IMPRESSION: 
1. No acute intracranial abnormality Signing/Reading Doctor: Cristian CABALLERO ([id number]) Yolanda Melendez ([id number])  Oct 17 2014 12:25PM                      
 
 
   
 
Review of Systems: A comprehensive review of systems was negative except for: Constitutional: positive for sweats, fatigue and malaise Eyes: positive for visual disturbance Respiratory: positive for cough, asthma, wheezing, dyspnea on exertion, chronic bronchitis or Shortness of breath Cardiovascular: positive for chest pressure/discomfort, palpitations, near-syncope, dyspnea on exertion, dizziness Gastrointestinal: positive for dyspepsia, nausea, vomiting, constipation and abdominal pain Musculoskeletal: positive for myalgias, arthralgias and stiff joints Neurological: positive for headaches and weakness Behvioral/Psych: positive for anxiety and depression Vitals:  
 04/30/19 1002 BP: 116/64 Pulse: 79 Resp: 16 SpO2: 98% Weight: 145 lb (65.8 kg) Height: 5' 5\" (1.651 m) Objective: I 
 
 
NEUROLOGICAL EXAM: 
 
Appearance: The patient is well developed, well nourished, provides a coherent history and is in no acute distress. Mental Status: Oriented to time, place and person, and the president, cognitive function is normal and speech is fluent and no aphasia or dysarthria. Mood and affect appropriate a bit anxious. Cranial Nerves:   Intact visual fields. Fundi are benign, disc are flat, no lesions seen on funduscopy. TAMIKA, EOM's full, no nystagmus, no ptosis. Facial sensation is normal. Corneal reflexes are not tested. Facial movement is symmetric. Hearing is normal bilaterally.  Palate is midline with normal sternocleidomastoid and trapezius muscles are normal. Tongue is midline. Neck without meningismus or bruits Temporal arteries are not tender or enlarged TMJ areas are not tender on palpation Motor:  5/5 strength in upper and lower proximal and distal muscles. Normal bulk and tone. No fasciculations. Rapid alternating movement is symmetric and intact bilaterally Muscles have some generalized tightness. Reflexes:   Deep tendon reflexes 2+/4 and symmetrical. 
No babinski or clonus present Sensory:   Normal to touch, pinprick and vibration and temperature. DSS is intact Gait:  Normal gait for patient's age. Tremor:   No tremor noted. Cerebellar:  No abnormal cerebellar signs present on Romberg and tandem testing and finger-nose-finger exam.  
Neurovascular:  Normal heart sounds and regular rhythm, peripheral pulses intact, and no carotid bruits. Assessment: ICD-10-CM ICD-9-CM 1. Intractable chronic migraine without aura and without status migrainosus G43.719 346.71 cyclobenzaprine (FLEXERIL) 5 mg tablet  
   butalbital-acetaminophen-caffeine (FIORICET, ESGIC) -40 mg per tablet SED RATE (ESR) CK MAGNESIUM  
   VITAMIN D, 25 HYDROXY  
   T3 TOTAL T4, FREE FERRITIN  
   CBC WITH AUTOMATED DIFF  
   METABOLIC PANEL, COMPREHENSIVE 2. Hypothyroidism due to acquired atrophy of thyroid E03.4 244.8 cyclobenzaprine (FLEXERIL) 5 mg tablet 246.8 butalbital-acetaminophen-caffeine (FIORICET, ESGIC) -40 mg per tablet SED RATE (ESR) CK MAGNESIUM  
   VITAMIN D, 25 HYDROXY  
   T3 TOTAL T4, FREE FERRITIN  
   CBC WITH AUTOMATED DIFF  
   METABOLIC PANEL, COMPREHENSIVE 3. Fibromyalgia M79.7 729.1 cyclobenzaprine (FLEXERIL) 5 mg tablet  
   butalbital-acetaminophen-caffeine (FIORICET, ESGIC) -40 mg per tablet SED RATE (ESR) CK MAGNESIUM  
   VITAMIN D, 25 HYDROXY  
   T3 TOTAL T4, FREE    FERRITIN  
   CBC WITH AUTOMATED DIFF  
   METABOLIC PANEL, COMPREHENSIVE  
 4. Vitamin D deficiency E55.9 268.9 cyclobenzaprine (FLEXERIL) 5 mg tablet  
   butalbital-acetaminophen-caffeine (FIORICET, ESGIC) -40 mg per tablet SED RATE (ESR) CK MAGNESIUM  
   VITAMIN D, 25 HYDROXY  
   T3 TOTAL T4, FREE FERRITIN  
   CBC WITH AUTOMATED DIFF  
   METABOLIC PANEL, COMPREHENSIVE Active Problems: * No active hospital problems. * 
 
 
Plan:  
 
Patient with marked allergies and hypersensitivity to numerous medications, and previous failure on numerous preventive medications including gabapentin, Cymbalta, nortriptyline, and unable to take beta-blockers, and already on magnesium and being tried on coenzyme Q 10 as a new therapy We offered her CGRP inhibitors or possible Botox but she does not want any injections with her history of allergies. Very difficult case, and 45 minutes, the patient going over her past history reviewing all her records going over all her medications, discussing therapeutic options, reviewing her previous test, metabolic female with the plan as above, and patient agrees to follow this plan. Check her thyroid because of slight tremor occasionally, sensation of weakness, and also check her B12 and vitamin D because of her weakness, and muscle pain, check sed rate and other metabolic parameters in view of all of her symptoms. Follow-up in 6 months time or earlier as need be patient call back with any problem. Signed By: Gildardo Lofton MD   
 April 30, 2019 CC: Carmita Temple DO 
FAX: [phone] This note will not be viewable in [address]

## 2019-05-09 ENCOUNTER — OFFICE VISIT (OUTPATIENT)
Dept: INTERNAL MEDICINE CLINIC | Age: 34
End: 2019-05-09

## 2019-05-09 VITALS
OXYGEN SATURATION: 100 % | SYSTOLIC BLOOD PRESSURE: 100 MMHG | WEIGHT: 144 LBS | HEART RATE: 86 BPM | HEIGHT: 65 IN | RESPIRATION RATE: 16 BRPM | TEMPERATURE: 98.4 F | BODY MASS INDEX: 23.99 KG/M2 | DIASTOLIC BLOOD PRESSURE: 63 MMHG

## 2019-05-09 DIAGNOSIS — K90.0 CELIAC DISEASE: ICD-10-CM

## 2019-05-09 DIAGNOSIS — G43.719 INTRACTABLE CHRONIC MIGRAINE WITHOUT AURA AND WITHOUT STATUS MIGRAINOSUS: ICD-10-CM

## 2019-05-09 DIAGNOSIS — Z88.9 MULTIPLE ALLERGIES: Primary | ICD-10-CM

## 2019-05-09 DIAGNOSIS — J45.909 EXTRINSIC ASTHMA, UNSPECIFIED ASTHMA SEVERITY, UNSPECIFIED WHETHER COMPLICATED, UNSPECIFIED WHETHER PERSISTENT: ICD-10-CM

## 2019-05-09 DIAGNOSIS — K21.00 REFLUX ESOPHAGITIS: ICD-10-CM

## 2019-05-09 DIAGNOSIS — M79.7 FIBROMYALGIA SYNDROME: ICD-10-CM

## 2019-05-09 RX ORDER — FAMOTIDINE 20 MG/1
20 TABLET, FILM COATED ORAL 2 TIMES DAILY
Qty: 180 TAB | Refills: 3 | Status: SHIPPED | OUTPATIENT
Start: 2019-05-09 | End: 2020-05-28

## 2019-05-09 RX ORDER — ALBUTEROL SULFATE 0.83 MG/ML
SOLUTION RESPIRATORY (INHALATION)
Refills: 11 | COMMUNITY
Start: 2019-03-20 | End: 2020-06-11 | Stop reason: SDUPTHER

## 2019-05-09 RX ORDER — AZELASTINE HCL 205.5 UG/1
SPRAY NASAL
Refills: 0 | COMMUNITY
Start: 2019-03-14 | End: 2020-06-11

## 2019-05-09 NOTE — PROGRESS NOTES
Maru Venegas is a 34 y.o. female who presents for evaluation of routine follow up. Last seen by me nov 6, 2018 in new pt visit. Had been having very tough time with numerous allergies. Saw allergist, and then pulmonary, dr Candido Ravi. Thought to have allergic asthma, but was unable to use any inhalers due to severe allergies. Saw a naturopath, dr Eva Umana, and is doing a liver detox program now, and is feeling better then she has in years. ROS: 
Constitutional: negative for fevers, chills, anorexia and weight loss Eyes:   negative for visual disturbance and irritation ENT:   negative for tinnitus,sore throat,nasal congestion,ear pain,hoarseness Respiratory:  negative for cough, hemoptysis, dyspnea,wheezing CV:   negative for chest pain, palpitations, lower extremity edema GI:   negative for nausea, vomiting, diarrhea, abdominal pain,melena Genitourinary: negative for frequency, dysuria and hematuria Musculoskel: negative for myalgias, arthralgias, back pain, muscle weakness, joint pain Neurological:  negative for headaches, dizziness, focal weakness, numbness Psychiatric:     Negative for depression or anxiety Past Medical History:  
Diagnosis Date  Abdominal pain, other specified site  Calculus of kidney RIGHT  Fibromyalgia 06/2016 [facility]  GERD (gastroesophageal reflux disease)  Hydronephrosis  Hypoglycemia, unspecified  Irregular menstrual cycle  Lumbago  Lupus ([facility] [address] 06/2016 [facility]  Migraine with aura, without mention of intractable migraine without mention of status migrainosus  Other ill-defined conditions(799.89)   
 mitral valve prolapse  Sacroiliitis, not elsewhere classified ([address] Past Surgical History:  
Procedure Laterality Date  HX ADENOIDECTOMY  HX TONSILLECTOMY  MT COLONOSCOPY FLX DX W/COLLJ SPEC WHEN PFRMD  4/7/2014  MI EGD TRANSORAL BIOPSY SINGLE/MULTIPLE  4/7/2014 Family History Problem Relation Age of Onset  Cancer Mother  Diabetes Mother [facility] Arthritis-osteo Mother  Thyroid Disease Mother  Migraines Mother  Seizures Mother  Diabetes Maternal Grandmother  Rashes/Skin Problems Maternal Grandmother  Diabetes Maternal Grandfather  Heart Disease Maternal Grandfather  Heart Attack Maternal Grandfather  Hypertension Maternal Grandfather  High Cholesterol Maternal Grandfather  Stroke Maternal Grandfather Social History Socioeconomic History  Marital status: SINGLE Spouse name: Not on file  Number of children: 0  
 Years of education: Not on file  Highest education level: Not on file Occupational History Employer: [employer] Social Needs  Financial resource strain: Not on file  Food insecurity:  
  Worry: Not on file Inability: Not on file  Transportation needs:  
  Medical: Not on file Non-medical: Not on file Tobacco Use  Smoking status: Never Smoker  Smokeless tobacco: Never Used Substance and Sexual Activity  Alcohol use: No  
 Drug use: No  
 Sexual activity: Yes  
  Partners: Male Birth control/protection: Condom Lifestyle  Physical activity:  
  Days per week: Not on file Minutes per session: Not on file  Stress: Not on file Relationships  Social connections:  
  Talks on phone: Not on file Gets together: Not on file Attends Yarsanism service: Not on file Active member of club or organization: Not on file Attends meetings of clubs or organizations: Not on file Relationship status: Not on file  Intimate partner violence:  
  Fear of current or ex partner: Not on file Emotionally abused: Not on file Physically abused: Not on file Forced sexual activity: Not on file Other Topics Concern  Not on file Social History Narrative  Not on file Visit Vitals /63 (BP 1 Location: Right arm, BP Patient Position: Sitting) Pulse 86 Temp 98.4 °F (36.9 °C) (Oral) Resp 16 Ht 5' 5\" (1.651 m) Wt 144 lb (65.3 kg) LMP 04/18/2019 SpO2 100% BMI 23.96 kg/m² Physical Examination:  
General - Well appearing female HEENT - PERRL, TM no erythema/opacification, normal nasal turbinates, no oropharyngeal erythema or exudate, MMM Neck - supple, no bruits, no thyroidomegaly, no lymphadenopathy Pulm - clear to auscultation bilaterally Cardio - RRR, normal S1 S2, no murmur Abd - soft, nontender, no masses, no HSM Extrem - no edema, +2 distal pulses Neuro-  No focal deficits, CN intact Assessment/Plan: 1. Numerous allergies--has seen allergist, pulmonary, and is waiting to see immunologist. 
2.  Allergic asthma--saw dr Belkys Velasco, who recommeneded xolair. 3.  Migraines--on coq10 now. Dr Hung Fernandez 4.  ? Hx sle--saw rheumatology for a while. Never received notes. ODILIA panel all normal though. rtc 6 months Daja Gains III, DO

## 2019-05-09 NOTE — PROGRESS NOTES
Reviewed record in preparation for visit and have obtained necessary documentation. Identified pt with two pt identifiers(name and ). Chief Complaint Patient presents with  Follow-up 6 month There are no preventive care reminders to display for this patient. Ms. Andrea Mejia has a reminder for a \"due or due soon\" health maintenance. I have asked that she discuss health maintenance topic(s) due with Her  primary care provider. Coordination of Care Questionnaire: 
:  
 
1) Have you been to an emergency room, urgent care clinic since your last visit? no  
Hospitalized since your last visit? no          
 
2) Have you seen or consulted any other health care providers outside of [address] [place] [address] since your last visit? no  (Include any pap smears or colon screenings in this section.) 3) Do you have an Advance Directive on file? no 
 
4) Are you interested in receiving information on Advance Directives? NO Patient is accompanied by self I have received verbal consent from Bree Jordan to discuss any/all medical information while they are present in the room.

## 2019-05-09 NOTE — PATIENT INSTRUCTIONS
Learning About Asthma Triggers What are triggers? When you have asthma, certain things can make your symptoms worse. These are called triggers. They include: · Cigarette smoke or air pollution. · Things you are allergic to, such as: 
¨ Pollen, mold, or dust mites. ¨ Pet hair, skin, or saliva. · Illnesses, like colds, flu, or pneumonia. · Exercise. · Dry, cold air. How do triggers affect asthma? Triggers can make it harder for your lungs to work as they should and can lead to sudden difficulty breathing and other symptoms. When you are around a trigger, an asthma attack is more likely. If your symptoms are severe, you may need emergency treatment or have to go to the hospital for treatment. If you know what your triggers are and can avoid them, you may be able to prevent asthma attacks, reduce how often you have them, and make them less severe. What can you do to avoid triggers? The first thing is to know your triggers. When you are having symptoms, note the things around you that might be causing them. Then look for patterns in what may be triggering your symptoms. Record your triggers on a piece of paper or in an asthma diary. When you have your list of possible triggers, work with your doctor to find ways to avoid them. You also can check how well your lungs are working by measuring your peak expiratory flow (PEF) throughout the day. Your PEF may drop when you are near things that trigger symptoms. Here are some ways to avoid a few common triggers. · Do not smoke or allow others to smoke around you. If you need help quitting, talk to your doctor about stop-smoking programs and medicines. These can increase your chances of quitting for good. · If there is a lot of pollution, pollen, or dust outside, stay at home and keep your windows closed. Use an air conditioner or air filter in your home. Check your local weather report or newspaper for air quality and pollen reports. · Get a flu shot every year. Talk to your doctor about getting a pneumococcal shot. Wash your hands often to prevent infections. · Avoid exercising outdoors in cold weather. If you are outdoors in cold weather, wear a scarf around your face and breathe through your nose. How can you manage an asthma attack? · If you have an asthma action plan, follow the plan. In general: ¨ Use your quick-relief inhaler as directed by your doctor. If your symptoms do not get better after you use your medicine, have someone take you to the emergency room. Call an ambulance if needed. ¨ If your doctor has given you other inhaled medicines or steroid pills, take them as directed. Where can you learn more? Go to [employer] Enter F090 in the search box to learn more about \"Learning About Asthma Triggers. \"  
© 1444-3264 [employer]. Care instructions adapted under license by [employer] (which disclaims liability or warranty for this information). This care instruction is for use with your licensed healthcare professional. If you have questions about a medical condition or this instruction, always ask your healthcare professional. [place] [address] any warranty or liability for your use of this information. Content Version: 28.7.166836; Last Revised: February 23, 2012

## 2019-05-28 ENCOUNTER — DOCUMENTATION ONLY (OUTPATIENT)
Dept: INTERNAL MEDICINE CLINIC | Age: 34
End: 2019-05-28

## 2019-06-03 ENCOUNTER — TELEPHONE (OUTPATIENT)
Dept: NEUROLOGY | Age: 34
End: 2019-06-03

## 2019-10-29 ENCOUNTER — PATIENT OUTREACH (OUTPATIENT)
Dept: OTHER | Age: 34
End: 2019-10-29

## 2019-10-29 NOTE — PROGRESS NOTES
Verified  and address for HIPAA security. Introduced [employer] for patient. Patient does not identify any Care Management needs at this time and declines services. Patient is no longer an employee with [employer].

## 2020-03-25 ENCOUNTER — TELEPHONE (OUTPATIENT)
Dept: INTERNAL MEDICINE CLINIC | Age: 35
End: 2020-03-25

## 2020-03-25 NOTE — TELEPHONE ENCOUNTER
Called, spoke to pt. Two identifiers confirmed. CPE scheduled for 3/31 @ 1030 with Dr. Gina Escoto.  Pt verbalized understanding of information discussed w/ no further questions at this time.

## 2020-03-25 NOTE — TELEPHONE ENCOUNTER
Caller's first and last name: N/A   Reason for call: Pt needed appt with Dr. Chasity Turcios for yearly evaluation to perform job duties. Needs paperwork \"Position Statement to Work\" completed. Callback required yes/no and why: Yes   Best contact number(s): [phone]   Details to clarify the request: No appt were available as soon as pt would like. Needs a call to discuss sooner options for urgent employment purposes.

## 2020-05-28 RX ORDER — FAMOTIDINE 20 MG/1
TABLET, FILM COATED ORAL
Qty: 180 TAB | Refills: 3 | Status: SHIPPED | OUTPATIENT
Start: 2020-05-28 | End: 2021-03-15 | Stop reason: SDUPTHER

## 2020-06-11 ENCOUNTER — VIRTUAL VISIT (OUTPATIENT)
Dept: INTERNAL MEDICINE CLINIC | Age: 35
End: 2020-06-11

## 2020-06-11 DIAGNOSIS — M79.7 FIBROMYALGIA SYNDROME: ICD-10-CM

## 2020-06-11 DIAGNOSIS — Z88.9 MULTIPLE ALLERGIES: ICD-10-CM

## 2020-06-11 DIAGNOSIS — J45.909 EXTRINSIC ASTHMA, UNSPECIFIED ASTHMA SEVERITY, UNSPECIFIED WHETHER COMPLICATED, UNSPECIFIED WHETHER PERSISTENT: Primary | ICD-10-CM

## 2020-06-11 DIAGNOSIS — E55.9 VITAMIN D DEFICIENCY: ICD-10-CM

## 2020-06-11 DIAGNOSIS — G43.719 INTRACTABLE CHRONIC MIGRAINE WITHOUT AURA AND WITHOUT STATUS MIGRAINOSUS: ICD-10-CM

## 2020-06-11 RX ORDER — ALBUTEROL SULFATE 0.83 MG/ML
SOLUTION RESPIRATORY (INHALATION)
Qty: 30 NEBULE | Refills: 11 | Status: SHIPPED | OUTPATIENT
Start: 2020-06-11 | End: 2021-03-15 | Stop reason: SDUPTHER

## 2020-06-11 RX ORDER — ALBUTEROL SULFATE 90 UG/1
AEROSOL, METERED RESPIRATORY (INHALATION)
Qty: 1 INHALER | Refills: 2 | Status: SHIPPED | OUTPATIENT
Start: 2020-06-11 | End: 2021-03-15 | Stop reason: SDUPTHER

## 2020-06-11 NOTE — PATIENT INSTRUCTIONS
Office Policies    Phone calls/patient messages:            Please allow up to 24 hours for someone in the office to contact you about your call or message. Be mindful your provider may be out of the office or your message may require further review. We encourage you to use [employer] for your messages as this is a faster, more efficient way to communicate with our office                         Medication Refills:            Prescription medications require 48-72 business hours to process. We encourage you to use [employer] for your refills. For controlled medications: Please allow 72 business hours to process. Certain medications may require you to  a written prescription at our office. NO narcotic/controlled medications will be prescribed after 4pm Monday through Friday or on weekends              Form/Paperwork Completion:            Please note a $25 fee may incur for all paperwork for completed by our providers. We ask that you allow 7-10 business days. Pre-payment is due prior to picking up/faxing the completed form. You may also download your forms to [employer] to have your doctor print off. This is an established visit conducted via telemedicine. The patient has been instructed that this meets HIPAA criteria and acknowledges and agrees to this method of visitation.     Aruna Franco LPN  66/61/26  4:14 PM

## 2020-06-11 NOTE — PROGRESS NOTES
Kevin Marx is a 37 y.o. female who was seen by synchronous (real-time) audio-video technology on 6/11/2020. Consent: Kevin aMrx, who was seen by synchronous (real-time) audio-video technology, and/or her healthcare decision maker, is aware that this patient-initiated, Telehealth encounter on 6/11/2020 is a billable service, with coverage as determined by her insurance carrier. She is aware that she may receive a bill and has provided verbal consent to proceed: Yes. Assessment & Plan:   Diagnoses and all orders for this visit:    1. Extrinsic asthma, unspecified asthma severity, unspecified whether complicated, unspecified whether persistent    2. Fibromyalgia syndrome    3. Multiple allergies    4. Intractable chronic migraine without aura and without status migrainosus    Other orders  -     ProAir HFA 90 mcg/actuation inhaler; INHALE 2 PUFFS EVERY 4 HOURS AS NEEDED. MAY USE 2 PUFFS 20-30 MINUTES BEFORE PHYSICAL EXERTION  -     albuterol (PROVENTIL VENTOLIN) 2.5 mg /3 mL (0.083 %) nebu; USE 1 NEBULE EVERY 4 HRS AS NEEDED        I spent at least 25 minutes on this visit with this established patient. 712  Subjective:   Kevin Marx is a 37 y.o. female who was seen for Annual Exam    Last seen by me may 9, 2019. Overall she has done well since then. 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 Was in [place] for 9 months at [facility].  In march, she got quite sick for a few weeks while working there. Symptoms strongly suggestive of covid 19, but she tested negative x 2. A close friend of hers had it, and was intubated for 2 weeks before finally getting better. Her next assignment is in [place], she heads up there in 2 weeks. thsi would be her annual cpe, but due to covid 19 it is a virtual visit, and must be billed as regular office visit. Prior to Admission medications    Medication Sig Start Date End Date Taking?  Authorizing Provider   famotidine (PEPCID) 20 mg tablet TAKE 1 TABLET BY MOUTH TWICE A DAY 5/28/20  Yes Delfino García III, DO   albuterol (PROVENTIL VENTOLIN) 2.5 mg /3 mL (0.083 %) nebulizer solution USE 1 NEBULE EVERY 4 HRS AS NEEDED 3/20/19  Yes Provider, Historical   pyridoxine HCl, vitamin B6, (VITAMIN B-6 PO) Take  by mouth daily. Yes Provider, Historical   b complex vitamins tablet Take 1 Tab by mouth daily. Yes Provider, Historical   butalbital-acetaminophen-caffeine (FIORICET, ESGIC) -40 mg per tablet Take 1 Tab by mouth every six (6) hours as needed for Headache. 4/30/19  Yes Edward Pina MD   co-enzyme Q-10 (CO Q-10) 100 mg capsule Take 300 mg by mouth daily. Yes Provider, Historical   Cetirizine (ZYRTEC) 10 mg cap Take 10 mg by mouth daily. Yes Provider, Historical   PROAIR HFA 90 mcg/actuation inhaler INHALE 2 PUFFS EVERY 4 HOURS AS NEEDED. MAY USE 2 PUFFS 20-30 MINUTES BEFORE PHYSICAL EXERTION 12/14/18  Yes Provider, Historical   MAGNESIUM PO Take  by mouth. Yes Provider, Historical   EPINEPHrine (EPIPEN) 0.3 mg/0.3 mL injection 0.3 mg by IntraMUSCular route once as needed. Yes Provider, Historical     Allergies   Allergen Reactions    Latex Hives    Corn Anaphylaxis    Egg Anaphylaxis    Milk Anaphylaxis    Peanut Anaphylaxis    Sesame Seed Anaphylaxis    Ciprofloxacin Other (comments)    Guaifenesin Other (comments)     Gained weight    Ibuprofen Other (comments)     \"started burning really bad\"    Influenza Virus Vaccine, Specific Other (comments)     Quadrivalent   Severe HA and neurological symptoms     Oxycodone Other (comments)    Qvar [Beclomethasone Dipropionate] Hives    Shellfish Derived Rash    Sulfa (Sulfonamide Antibiotics) Hives and Other (comments)     achy    [id number] Antimigraine Agents Palpitations     Chest pain    Wheat Other (comments)     Mouth bleeds           ROS      Objective: There were no vitals taken for this visit.    General: alert, cooperative, no distress   Mental  status: normal mood, behavior, speech, dress, motor activity, and thought processes, able to follow commands   HENT: NCAT   Neck: no visualized mass   Resp: no respiratory distress   Neuro: no gross deficits   Skin: no discoloration or lesions of concern on visible areas   Psychiatric: normal affect, consistent with stated mood, no evidence of hallucinations     Additional exam findings:     1. Asthma--overall doing quite well, only needs her nebulizer or mdi a few times per month. However, asks for work note to try to limit her exposure to covid 19 pts. Check cbc, cmp, flp, tsh, a1c  2.  gerd--on pepcid  3. Seasonal allergies--uses zyrtec, proair  4.  ? Fibromyalgia syndrome--much improved since she finished her liver detox program and has adjusted her diet. 5.  Vit d def--check levels    rtc one year, sooner if labs abn. We discussed the expected course, resolution and complications of the diagnosis(es) in detail. Medication risks, benefits, costs, interactions, and alternatives were discussed as indicated. I advised her to contact the office if her condition worsens, changes or fails to improve as anticipated. She expressed understanding with the diagnosis(es) and plan. Huy Julien is a 37 y.o. female who was evaluated by a video visit encounter for concerns as above. Patient identification was verified prior to start of the visit. A caregiver was present when appropriate. Due to this being a TeleHealth encounter (During [facility]- public health emergency), evaluation of the following organ systems was limited: Vitals/Constitutional/EENT/Resp/CV/GI//MS/Neuro/Skin/Heme-Lymph-Imm.   Pursuant to the emergency declaration under the [facility] waiver authority and the [employer] Act, this Virtual  Visit was conducted, with patient's (and/or legal guardian's) consent, to reduce the patient's risk of exposure to COVID-19 and provide necessary medical care. Services were provided through a video synchronous discussion virtually to substitute for in-person clinic visit. Patient and provider were located at their individual homes.       Jf Baker III,

## 2021-03-16 RX ORDER — FAMOTIDINE 20 MG/1
20 TABLET, FILM COATED ORAL 2 TIMES DAILY
Qty: 180 TAB | Refills: 3 | Status: SHIPPED | OUTPATIENT
Start: 2021-03-16 | End: 2021-03-16 | Stop reason: SDUPTHER

## 2021-03-16 RX ORDER — ALBUTEROL SULFATE 0.83 MG/ML
SOLUTION RESPIRATORY (INHALATION)
Qty: 30 NEBULE | Refills: 11 | Status: SHIPPED | OUTPATIENT
Start: 2021-03-16 | End: 2021-03-16 | Stop reason: SDUPTHER

## 2021-03-16 RX ORDER — ALBUTEROL SULFATE 0.83 MG/ML
SOLUTION RESPIRATORY (INHALATION)
Qty: 30 NEBULE | Refills: 11 | Status: SHIPPED | OUTPATIENT
Start: 2021-03-16 | End: 2022-01-20 | Stop reason: SDUPTHER

## 2021-03-16 RX ORDER — ALBUTEROL SULFATE 90 UG/1
AEROSOL, METERED RESPIRATORY (INHALATION)
Qty: 1 INHALER | Refills: 2 | Status: SHIPPED | OUTPATIENT
Start: 2021-03-16 | End: 2021-12-21 | Stop reason: SDUPTHER

## 2021-03-16 RX ORDER — FAMOTIDINE 20 MG/1
20 TABLET, FILM COATED ORAL 2 TIMES DAILY
Qty: 180 TAB | Refills: 3 | Status: SHIPPED | OUTPATIENT
Start: 2021-03-16 | End: 2021-12-21 | Stop reason: SDUPTHER

## 2021-03-16 RX ORDER — EPINEPHRINE 0.3 MG/.3ML
0.3 INJECTION SUBCUTANEOUS
Qty: 1 SYRINGE | Refills: 1 | Status: SHIPPED | OUTPATIENT
Start: 2021-03-16 | End: 2021-03-16

## 2021-03-16 RX ORDER — ALBUTEROL SULFATE 90 UG/1
AEROSOL, METERED RESPIRATORY (INHALATION)
Qty: 1 INHALER | Refills: 2 | Status: SHIPPED | OUTPATIENT
Start: 2021-03-16 | End: 2021-03-16 | Stop reason: SDUPTHER

## 2021-03-16 NOTE — TELEPHONE ENCOUNTER
----- Message from [address] [place], [place] [address] sent at 3/16/2021 10:16 AM EDT -----  Regarding: RE: Prescription Question  Contact: [phone]  My new pharmacy just notified me that I dont have any refills for the famatodine. Im almost out and want to make sure this is sent over to them with the mens and inhaler. Also, my epi pen is getting ready to ; I havent used it. Can I get a refill?

## 2021-03-16 NOTE — TELEPHONE ENCOUNTER
Future Appointments:  No future appointments. Last Appointment With Me:  Visit date not found     Requested Prescriptions     Pending Prescriptions Disp Refills    famotidine (PEPCID) 20 mg tablet 180 Tab 3     Sig: Take 1 Tab by mouth two (2) times a day.  EPINEPHrine (EpiPen) 0.3 mg/0.3 mL injection 1 Syringe 1     Si.3 mL by IntraMUSCular route once as needed for Anaphylaxis for up to 1 dose.     ProAir HFA 90 mcg/actuation inhaler 1 Inhaler 2     Sig: INHALE 2 PUFFS EVERY 4 HOURS AS NEEDED. MAY USE 2 PUFFS 20-30 MINUTES BEFORE PHYSICAL EXERTION

## 2021-06-24 ENCOUNTER — OFFICE VISIT (OUTPATIENT)
Dept: INTERNAL MEDICINE CLINIC | Age: 36
End: 2021-06-24
Payer: COMMERCIAL

## 2021-06-24 VITALS
DIASTOLIC BLOOD PRESSURE: 61 MMHG | OXYGEN SATURATION: 98 % | HEIGHT: 65 IN | HEART RATE: 71 BPM | WEIGHT: 132.6 LBS | RESPIRATION RATE: 14 BRPM | BODY MASS INDEX: 22.09 KG/M2 | SYSTOLIC BLOOD PRESSURE: 94 MMHG

## 2021-06-24 DIAGNOSIS — J45.20 MILD INTERMITTENT ASTHMA WITHOUT COMPLICATION: ICD-10-CM

## 2021-06-24 DIAGNOSIS — M25.50 POLYARTHRALGIA: ICD-10-CM

## 2021-06-24 DIAGNOSIS — E55.9 VITAMIN D DEFICIENCY: ICD-10-CM

## 2021-06-24 DIAGNOSIS — M79.7 FIBROMYALGIA SYNDROME: ICD-10-CM

## 2021-06-24 DIAGNOSIS — K21.9 GASTROESOPHAGEAL REFLUX DISEASE, UNSPECIFIED WHETHER ESOPHAGITIS PRESENT: ICD-10-CM

## 2021-06-24 DIAGNOSIS — Z88.9 MULTIPLE ALLERGIES: ICD-10-CM

## 2021-06-24 DIAGNOSIS — Z00.00 ANNUAL PHYSICAL EXAM: Primary | ICD-10-CM

## 2021-06-24 PROCEDURE — 99395 PREV VISIT EST AGE 18-39: CPT | Performed by: INTERNAL MEDICINE

## 2021-06-24 RX ORDER — DIPHENHYDRAMINE HCL 25 MG
25 CAPSULE ORAL DAILY
COMMUNITY
End: 2021-06-24 | Stop reason: ALTCHOICE

## 2021-06-24 RX ORDER — EPINEPHRINE 0.3 MG/.3ML
0.3 INJECTION SUBCUTANEOUS AS NEEDED
COMMUNITY
Start: 2021-06-23 | End: 2021-12-21 | Stop reason: SDUPTHER

## 2021-06-24 NOTE — PATIENT INSTRUCTIONS
Well Visit, Ages 25 to 48: Care Instructions  Overview     Well visits can help you stay healthy. Your doctor has checked your overall health and may have suggested ways to take good care of yourself. Your doctor also may have recommended tests. At home, you can help prevent illness with healthy eating, regular exercise, and other steps. Follow-up care is a key part of your treatment and safety. Be sure to make and go to all appointments, and call your doctor if you are having problems. It's also a good idea to know your test results and keep a list of the medicines you take. How can you care for yourself at home? · Get screening tests that you and your doctor decide on. Screening helps find diseases before any symptoms appear. · Eat healthy foods. Choose fruits, vegetables, whole grains, protein, and low-fat dairy foods. Limit fat, especially saturated fat. Reduce salt in your diet. · Limit alcohol. If you are a man, have no more than 2 drinks a day or 14 drinks a week. If you are a woman, have no more than 1 drink a day or 7 drinks a week. · Get at least 30 minutes of physical activity on most days of the week. Walking is a good choice. You also may want to do other activities, such as running, swimming, cycling, or playing tennis or team sports. Discuss any changes in your exercise program with your doctor. · Reach and stay at a healthy weight. This will lower your risk for many problems, such as obesity, diabetes, heart disease, and high blood pressure. · Do not smoke or allow others to smoke around you. If you need help quitting, talk to your doctor about stop-smoking programs and medicines. These can increase your chances of quitting for good. · Care for your mental health. It is easy to get weighed down by worry and stress. Learn strategies to manage stress, like deep breathing and mindfulness, and stay connected with your family and community.  If you find you often feel sad or hopeless, talk with your doctor. Treatment can help. · Talk to your doctor about whether you have any risk factors for sexually transmitted infections (STIs). You can help prevent STIs if you wait to have sex with a new partner (or partners) until you've each been tested for STIs. It also helps if you use condoms (male or female condoms) and if you limit your sex partners to one person who only has sex with you. Vaccines are available for some STIs, such as HPV. · Use birth control if it's important to you to prevent pregnancy. Talk with your doctor about the choices available and what might be best for you. · If you think you may have a problem with alcohol or drug use, talk to your doctor. This includes prescription medicines (such as amphetamines and opioids) and illegal drugs (such as cocaine and methamphetamine). Your doctor can help you figure out what type of treatment is best for you. · Protect your skin from too much sun. When you're outdoors from 10 a.m. to 4 p.m., stay in the shade or cover up with clothing and a hat with a wide brim. Wear sunglasses that block UV rays. Even when it's cloudy, put broad-spectrum sunscreen (SPF 30 or higher) on any exposed skin. · See a dentist one or two times a year for checkups and to have your teeth cleaned. · Wear a seat belt in the car. When should you call for help? Watch closely for changes in your health, and be sure to contact your doctor if you have any problems or symptoms that concern you. Where can you learn more? Go to http://www.Eng[employer].com/  Enter P072 in the search box to learn more about \"Well Visit, Ages 25 to 48: Care Instructions. \"  Current as of: May 27, 2020               Content Version: 12.8  © 3846-3630 Healthwise, Incorporated. Care instructions adapted under license by [employer] (which disclaims liability or warranty for this information).  If you have questions about a medical condition or this instruction, always ask your healthcare professional. [place] [address] any warranty or liability for your use of this information. Come back for fasting labs. Nothing to eat after MN, but may drink water.

## 2021-06-24 NOTE — PROGRESS NOTES
Tawnya Malone is a 31 y.o. female who presents for evaluation of annual cpe. Last seen by me June 11, 2020 in virtual visit. 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 Only works fri, sat, Sunday. Complains today of diffuse arthralgias in most joints, as well as some muscle aches and pains, and unprovoked easy bruises. States that her bruises come and go all of the time. Are there one day, gone the next. Having to also use her proair mdi bit more often of late, few times per week. Having some lightheaded spells lately at work as well. ROS:  Constitutional: negative for fevers, chills, anorexia and weight loss  Eyes:   negative for visual disturbance and irritation  ENT:   negative for tinnitus,sore throat,nasal congestion,ear pain,hoarseness  Respiratory:  negative for cough, hemoptysis, dyspnea,wheezing  CV:   negative for chest pain, palpitations, lower extremity edema  GI:   negative for nausea, vomiting, diarrhea, abdominal pain,melena  Genitourinary: negative for frequency, dysuria and hematuria  Musculoskel: negative for myalgias, arthralgias, back pain, muscle weakness.   ++diffuse joint pain  Neurological:  negative for headaches, dizziness, focal weakness, numbness  Psychiatric:     Negative for depression or anxiety      Past Medical History:   Diagnosis Date    Abdominal pain, other specified site     Calculus of kidney     RIGHT    Fibromyalgia 06/2016    [facility]    GERD (gastroesophageal reflux disease)     Hydronephrosis     Hypoglycemia, unspecified     Irregular menstrual cycle     Lumbago     Lupus ([facility] [address] 06/2016    [facility]    Migraine with aura, without mention of intractable migraine without mention of status migrainosus     Other ill-defined conditions(799.89)     mitral valve prolapse    Sacroiliitis, not elsewhere classified ([facility] [address] Past Surgical History:   Procedure Laterality Date    HX ADENOIDECTOMY      HX TONSILLECTOMY      ND COLONOSCOPY FLX DX W/COLLJ SPEC WHEN PFRMD  4/7/2014         ND EGD TRANSORAL BIOPSY SINGLE/MULTIPLE  4/7/2014            Family History   Problem Relation Age of Onset    Cancer Mother     Diabetes Mother     Arthritis-osteo Mother     Thyroid Disease Mother    Leia Officer Migraines Mother     Seizures Mother     Diabetes Maternal Grandmother     Rashes/Skin Problems Maternal Grandmother     Diabetes Maternal Grandfather     Heart Disease Maternal Grandfather     Heart Attack Maternal Grandfather     Hypertension Maternal Grandfather     High Cholesterol Maternal Grandfather     Stroke Maternal Grandfather        Social History     Socioeconomic History    Marital status: SINGLE     Spouse name: Not on file    Number of children: 0    Years of education: Not on file    Highest education level: Not on file   Occupational History     Employer: [employer]   Tobacco Use    Smoking status: Never Smoker    Smokeless tobacco: Never Used   Substance and Sexual Activity    Alcohol use: No    Drug use: No    Sexual activity: Yes     Partners: Male     Birth control/protection: Condom   Other Topics Concern    Not on file   Social History Narrative    Not on file     Social Determinants of Health     Financial Resource Strain:     Difficulty of Paying Living Expenses:    Food Insecurity:     Worried About Running Out of Food in the Last Year:     Ran Out of Food in the Last Year:    Transportation Needs:     Lack of Transportation (Medical):      Lack of Transportation (Non-Medical):    Physical Activity:     Days of Exercise per Week:     Minutes of Exercise per Session:    Stress:     Feeling of Stress :    Social Connections:     Frequency of Communication with Friends and Family:     Frequency of Social Gatherings with Friends and Family:     Attends Cheondoism Services:     Active Member of Clubs or Organizations:     Attends Club or Organization Meetings:     Marital Status:    Intimate Partner Violence:     Fear of Current or Ex-Partner:     Emotionally Abused:     Physically Abused:     Sexually Abused:             Visit Vitals  BP 94/61 (BP 1 Location: Left upper arm, BP Patient Position: Sitting)   Pulse 71   Resp 14   Ht 5' 5\" (1.651 m)   Wt 132 lb 9.6 oz (60.1 kg)   LMP 06/18/2021 (Exact Date)   SpO2 98%   BMI 22.07 kg/m²       Physical Examination:   General - Well appearing female  HEENT - PERRL, TM no erythema/opacification, normal nasal turbinates, no oropharyngeal erythema or exudate, MMM  Neck - supple, no bruits, no thyroidomegaly, no lymphadenopathy  Pulm - clear to auscultation bilaterally  Cardio - RRR, normal S1 S2, no murmur  Abd - soft, nontender, no masses, no HSM  Extrem - no edema, +2 distal pulses  Neuro-  No focal deficits, CN intact     Assessment/Plan:    1. Annual cpe--check cbc, cmp, flp, tsh, a1c, hcv  2. Asthma--continue with prn proair. Symptoms aren't consistently bad enough to need daily mdi or singulair  3. Fibromyalgia--if labs ok, will send in elavil 10 mg qhs  4.  gerd--continue pepcid  5. Diffuse arthralgias--none appear inflamed currently. Check esr. Suspect fibromyalgia  6. Myalgias--check cpk, suspect fibro.     rtc 6 months        Cade Singleton III, DO

## 2021-06-28 ENCOUNTER — LAB ONLY (OUTPATIENT)
Dept: INTERNAL MEDICINE CLINIC | Age: 36
End: 2021-06-28

## 2021-06-28 DIAGNOSIS — E55.9 VITAMIN D DEFICIENCY: ICD-10-CM

## 2021-06-28 DIAGNOSIS — M25.50 POLYARTHRALGIA: ICD-10-CM

## 2021-06-28 DIAGNOSIS — M79.7 FIBROMYALGIA SYNDROME: ICD-10-CM

## 2021-06-28 DIAGNOSIS — Z00.00 ANNUAL PHYSICAL EXAM: ICD-10-CM

## 2021-06-28 LAB
25(OH)D3 SERPL-MCNC: 21.6 NG/ML (ref 30–100)
ALBUMIN SERPL-MCNC: 4.1 G/DL (ref 3.5–5)
ALBUMIN/GLOB SERPL: 1.2 {RATIO} (ref 1.1–2.2)
ALP SERPL-CCNC: 96 U/L (ref 45–117)
ALT SERPL-CCNC: 17 U/L (ref 12–78)
ANION GAP SERPL CALC-SCNC: 4 MMOL/L (ref 5–15)
AST SERPL-CCNC: 11 U/L (ref 15–37)
BASOPHILS # BLD: 0 K/UL (ref 0–0.1)
BASOPHILS NFR BLD: 0 % (ref 0–1)
BILIRUB SERPL-MCNC: 0.6 MG/DL (ref 0.2–1)
BUN SERPL-MCNC: 13 MG/DL (ref 6–20)
BUN/CREAT SERPL: 30 (ref 12–20)
CALCIUM SERPL-MCNC: 9.2 MG/DL (ref 8.5–10.1)
CHLORIDE SERPL-SCNC: 108 MMOL/L (ref 97–108)
CHOLEST SERPL-MCNC: 179 MG/DL
CK SERPL-CCNC: 31 U/L (ref 26–192)
CO2 SERPL-SCNC: 26 MMOL/L (ref 21–32)
CREAT SERPL-MCNC: 0.44 MG/DL (ref 0.55–1.02)
DIFFERENTIAL METHOD BLD: NORMAL
EOSINOPHIL # BLD: 0.1 K/UL (ref 0–0.4)
EOSINOPHIL NFR BLD: 1 % (ref 0–7)
ERYTHROCYTE [DISTWIDTH] IN BLOOD BY AUTOMATED COUNT: 12.3 % (ref 11.5–14.5)
ERYTHROCYTE [SEDIMENTATION RATE] IN BLOOD: 17 MM/HR (ref 0–20)
EST. AVERAGE GLUCOSE BLD GHB EST-MCNC: 111 MG/DL
GLOBULIN SER CALC-MCNC: 3.4 G/DL (ref 2–4)
GLUCOSE SERPL-MCNC: 105 MG/DL (ref 65–100)
HBA1C MFR BLD: 5.5 % (ref 4–5.6)
HCT VFR BLD AUTO: 39 % (ref 35–47)
HDLC SERPL-MCNC: 68 MG/DL
HDLC SERPL: 2.6 {RATIO} (ref 0–5)
HGB BLD-MCNC: 12.5 G/DL (ref 11.5–16)
IMM GRANULOCYTES # BLD AUTO: 0 K/UL (ref 0–0.04)
IMM GRANULOCYTES NFR BLD AUTO: 0 % (ref 0–0.5)
LDLC SERPL CALC-MCNC: 101.8 MG/DL (ref 0–100)
LYMPHOCYTES # BLD: 1.3 K/UL (ref 0.8–3.5)
LYMPHOCYTES NFR BLD: 26 % (ref 12–49)
MCH RBC QN AUTO: 30.9 PG (ref 26–34)
MCHC RBC AUTO-ENTMCNC: 32.1 G/DL (ref 30–36.5)
MCV RBC AUTO: 96.5 FL (ref 80–99)
MONOCYTES # BLD: 0.5 K/UL (ref 0–1)
MONOCYTES NFR BLD: 10 % (ref 5–13)
NEUTS SEG # BLD: 3 K/UL (ref 1.8–8)
NEUTS SEG NFR BLD: 63 % (ref 32–75)
NRBC # BLD: 0 K/UL (ref 0–0.01)
NRBC BLD-RTO: 0 PER 100 WBC
PLATELET # BLD AUTO: 177 K/UL (ref 150–400)
PMV BLD AUTO: 12.5 FL (ref 8.9–12.9)
POTASSIUM SERPL-SCNC: 4.2 MMOL/L (ref 3.5–5.1)
PROT SERPL-MCNC: 7.5 G/DL (ref 6.4–8.2)
RBC # BLD AUTO: 4.04 M/UL (ref 3.8–5.2)
SODIUM SERPL-SCNC: 138 MMOL/L (ref 136–145)
TRIGL SERPL-MCNC: 46 MG/DL (ref ?–150)
TSH SERPL DL<=0.05 MIU/L-ACNC: 3.27 UIU/ML (ref 0.36–3.74)
VLDLC SERPL CALC-MCNC: 9.2 MG/DL
WBC # BLD AUTO: 4.9 K/UL (ref 3.6–11)

## 2021-06-29 LAB
HCV AB S/CO SERPL IA: <0.1 S/CO RATIO (ref 0–0.9)
HCV AB SERPL QL IA: NORMAL

## 2021-06-29 RX ORDER — CHOLECALCIFEROL TAB 125 MCG (5000 UNIT) 125 MCG
5000 TAB ORAL DAILY
Qty: 90 TABLET | Refills: 3 | Status: SHIPPED | OUTPATIENT
Start: 2021-06-29 | End: 2022-05-31

## 2021-11-09 ENCOUNTER — DOCUMENTATION ONLY (OUTPATIENT)
Dept: INTERNAL MEDICINE CLINIC | Age: 36
End: 2021-11-09

## 2021-11-09 NOTE — PROGRESS NOTES
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 Patient advised of 7-10 business day turnaround time for form completion & may incur a fee of $25.00. This has been fully explained to the patient, who indicates understanding.

## 2021-11-10 ENCOUNTER — TELEPHONE (OUTPATIENT)
Dept: INTERNAL MEDICINE CLINIC | Age: 36
End: 2021-11-10

## 2021-11-10 NOTE — TELEPHONE ENCOUNTER
Called patient, 2 identifiers, advised paperwork requested for vaccine exemption is signed and at the  for  per her request.  No further questions at this time.

## 2021-11-16 NOTE — TELEPHONE ENCOUNTER
Pt came in today at 11:55 am and picked up her envelope. Pt opened envelope in front of psr and states all is correct and complete. No further action required.       swebb 11/16/21

## 2021-12-21 ENCOUNTER — OFFICE VISIT (OUTPATIENT)
Dept: INTERNAL MEDICINE CLINIC | Age: 36
End: 2021-12-21
Payer: COMMERCIAL

## 2021-12-21 VITALS
TEMPERATURE: 97 F | DIASTOLIC BLOOD PRESSURE: 64 MMHG | RESPIRATION RATE: 16 BRPM | HEIGHT: 65 IN | BODY MASS INDEX: 21.99 KG/M2 | WEIGHT: 132 LBS | OXYGEN SATURATION: 100 % | SYSTOLIC BLOOD PRESSURE: 96 MMHG | HEART RATE: 74 BPM

## 2021-12-21 DIAGNOSIS — R10.13 DYSPEPSIA: ICD-10-CM

## 2021-12-21 DIAGNOSIS — E55.9 VITAMIN D DEFICIENCY: Primary | ICD-10-CM

## 2021-12-21 DIAGNOSIS — L65.9 HAIR LOSS: ICD-10-CM

## 2021-12-21 DIAGNOSIS — J45.20 MILD INTERMITTENT ASTHMA WITHOUT COMPLICATION: ICD-10-CM

## 2021-12-21 DIAGNOSIS — M79.7 FIBROMYALGIA SYNDROME: ICD-10-CM

## 2021-12-21 LAB
T4 FREE SERPL-MCNC: 0.5 NG/DL (ref 0.8–1.5)
TSH SERPL DL<=0.05 MIU/L-ACNC: 6.91 UIU/ML (ref 0.36–3.74)

## 2021-12-21 PROCEDURE — 99214 OFFICE O/P EST MOD 30 MIN: CPT | Performed by: INTERNAL MEDICINE

## 2021-12-21 RX ORDER — ALBUTEROL SULFATE 90 UG/1
AEROSOL, METERED RESPIRATORY (INHALATION)
Qty: 18 G | Refills: 1 | Status: SHIPPED | OUTPATIENT
Start: 2021-12-21

## 2021-12-21 RX ORDER — FAMOTIDINE 20 MG/1
20 TABLET, FILM COATED ORAL 2 TIMES DAILY
Qty: 180 TABLET | Refills: 3 | Status: SHIPPED | OUTPATIENT
Start: 2021-12-21 | End: 2022-05-31

## 2021-12-21 RX ORDER — EPINEPHRINE 0.3 MG/.3ML
0.3 INJECTION SUBCUTANEOUS AS NEEDED
Qty: 1 EACH | Refills: 1 | Status: SHIPPED | OUTPATIENT
Start: 2021-12-21

## 2021-12-21 NOTE — PROGRESS NOTES
Christi Rosario is a 31 y.o. female who presents for evaluation of routine follow up. Last seen by me June 24, 2021 in [facility]. Labs done then were good, x vit D was low, for which she is now taking a supplement. Complains today of having episodes of hands and feet becoming very cold, and then her entire person feeling cold. She states that she has been taking a hot shower, but does not sense that it is hot (though she has not burned herself, thankfully). Her hands and feet will turn white in color, then resolve after 20-30 minutes. 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       ROS:  Constitutional: negative for fevers, chills, anorexia and weight loss  Eyes:   negative for visual disturbance and irritation  ENT:   negative for tinnitus,sore throat,nasal congestion,ear pain,hoarseness  Respiratory:  negative for cough, hemoptysis, dyspnea,wheezing  CV:   negative for chest pain, palpitations, lower extremity edema  GI:   negative for nausea, vomiting, diarrhea, abdominal pain,melena  Genitourinary: negative for frequency, dysuria and hematuria  Musculoskel: negative for myalgias, arthralgias, back pain, muscle weakness, joint pain  Neurological:  negative for headaches, dizziness, focal weakness, numbness  Psychiatric:     Negative for depression or anxiety      Past Medical History:   Diagnosis Date    Abdominal pain, other specified site     Calculus of kidney     RIGHT    Fibromyalgia 06/2016    [facility]    GERD (gastroesophageal reflux disease)     Hydronephrosis     Hypoglycemia, unspecified     Irregular menstrual cycle     Lumbago     Lupus ([facility] [address] 06/2016    [facility]    Migraine with aura, without mention of intractable migraine without mention of status migrainosus     Other ill-defined conditions(799.89)     mitral valve prolapse    Sacroiliitis, not elsewhere classified [facility])        Past Surgical History: Procedure Laterality Date    HX ADENOIDECTOMY      HX TONSILLECTOMY      MT COLONOSCOPY FLX DX W/COLLJ SPEC WHEN PFRMD  4/7/2014         MT EGD TRANSORAL BIOPSY SINGLE/MULTIPLE  4/7/2014            Family History   Problem Relation Age of Onset    Cancer Mother     Diabetes Mother     OSTEOARTHRITIS Mother     Thyroid Disease Mother    [place] Migraines Mother     Seizures Mother     Diabetes Maternal Grandmother     Rashes/Skin Problems Maternal Grandmother     Diabetes Maternal Grandfather     Heart Disease Maternal Grandfather     Heart Attack Maternal Grandfather     Hypertension Maternal Grandfather     High Cholesterol Maternal Grandfather     Stroke Maternal Grandfather        Social History     Socioeconomic History    Marital status: SINGLE     Spouse name: Not on file    Number of children: 0    Years of education: Not on file    Highest education level: Not on file   Occupational History     Employer: [email or web address]   Tobacco Use    Smoking status: Never Smoker    Smokeless tobacco: Never Used   Substance and Sexual Activity    Alcohol use: No    Drug use: No    Sexual activity: Yes     Partners: Male     Birth control/protection: Condom   Other Topics Concern    Not on file   Social History Narrative    Not on file     Social Determinants of Health     Financial Resource Strain:     Difficulty of Paying Living Expenses: Not on file   Food Insecurity:     Worried About Running Out of Food in the Last Year: Not on file    [place] of Food in the Last Year: Not on file   Transportation Needs:     Lack of Transportation (Medical): Not on file    Lack of Transportation (Non-Medical):  Not on file   Physical Activity:     Days of Exercise per Week: Not on file    Minutes of Exercise per Session: Not on file   Stress:     Feeling of Stress : Not on file   Social Connections:     Frequency of Communication with Friends and Family: Not on file    Frequency of Social Gatherings with Friends and Family: Not on file    Attends Baptism Services: Not on file    Active Member of Clubs or Organizations: Not on file    Attends Club or Organization Meetings: Not on file    Marital Status: Not on file   Intimate Partner Violence:     Fear of Current or Ex-Partner: Not on file    Emotionally Abused: Not on file    Physically Abused: Not on file    Sexually Abused: Not on file   Housing Stability:     Unable to Pay for Housing in the Last Year: Not on file    Number of [place] in the Last Year: Not on file    Unstable Housing in the Last Year: Not on file            Visit Vitals  BP 96/64 (BP 1 Location: Left upper arm, BP Patient Position: Sitting)   Pulse 74   Temp 97 °F (36.1 °C) (Temporal)   Resp 16   Ht 5' 5\" (1.651 m)   Wt 132 lb (59.9 kg)   LMP 12/08/2021 (Approximate)   SpO2 100%   BMI 21.97 kg/m²       Physical Examination:   General - Well appearing female  HEENT - PERRL, TM no erythema/opacification, normal nasal turbinates, no oropharyngeal erythema or exudate, MMM  Neck - supple, no bruits, no thyroidomegaly, no lymphadenopathy  Pulm - clear to auscultation bilaterally  Cardio - RRR, normal S1 S2, no murmur  Abd - soft, nontender, no masses, no HSM  Extrem - no edema, +2 distal pulses. No signs of raynauds or vascular disease. Neuro-  No focal deficits, CN intact     Assessment/Plan:    1. Hair loss--check tsh, ft4. If normal, suggested to try biotin. Told her most likely due to stress  2. Vit d def--on replacement. Is sleeping much better since started replacement  3. Fibromyalgia--much improved with vit d. Opted to not start elavil. 4.  Dyspepsia--continue pepcid  5. Asthma--does well with prn proair  6. Subjective cold distal extremities--doubt raynauds. Has appt next month with neurology.   Consider rheum eval in future if persists    rtc 6 months        Lolita Watson III, DO

## 2021-12-21 NOTE — PATIENT INSTRUCTIONS
Indigestion (Dyspepsia or Heartburn): Care Instructions  Your Care Instructions  Sometimes it can be hard to pinpoint the cause of indigestion. (It is also called dyspepsia or heartburn.) Most cases of an upset stomach with bloating, burning, burping, and nausea are minor and go away within several hours. Home treatment and over-the-counter medicine often are able to control symptoms. But if you take medicine to relieve your indigestion without making diet and lifestyle changes, your symptoms are likely to return again and again. If you get indigestion often, it may be a sign of a more serious medical problem. Be sure to follow up with your doctor, who may want to do tests to be sure of the cause of your indigestion. Follow-up care is a key part of your treatment and safety. Be sure to make and go to all appointments, and call your doctor if you are having problems. It's also a good idea to know your test results and keep a list of the medicines you take. How can you care for yourself at home? · Your doctor may recommend over-the-counter medicine. For mild or occasional indigestion, antacids such as Gaviscon, Mylanta, Maalox, or Tums, may help. Be safe with medicines. Be careful when you take over-the-counter antacid medicines. Many of these medicines have aspirin in them. Read the label to make sure that you are not taking more than the recommended dose. Too much aspirin can be harmful. · Your doctor also may recommend over-the-counter acid reducers, such as Pepcid AC (famotidine), Tagamet HB (cimetidine), or Prilosec (omeprazole). Read and follow all instructions on the label. If you use these medicines often, talk with your doctor. · Change your eating habits. ? It's best to eat several small meals instead of two or three large meals. ? After you eat, wait 2 to 3 hours before you lie down. ? Chocolate, mint, and alcohol can make GERD worse. ?  Spicy foods, foods that have a lot of acid (like tomatoes and oranges), and coffee can make GERD symptoms worse in some people. If your symptoms are worse after you eat a certain food, you may want to stop eating that food to see if your symptoms get better. · Do not smoke or chew tobacco. Smoking can make GERD worse. If you need help quitting, talk to your doctor about stop-smoking programs and medicines. These can increase your chances of quitting for good. · If you have GERD symptoms at night, raise the head of your bed 6 to 8 inches. You can do this by putting the frame on blocks or placing a foam wedge under the head of your mattress. (Adding extra pillows does not work.)  · Do not wear tight clothing around your middle. · Lose weight if you need to. Losing just 5 to 10 pounds can help. · Do not take anti-inflammatory medicines, such as aspirin, ibuprofen (Advil, Motrin), or naproxen (Aleve). These can irritate the stomach. If you need a pain medicine, try acetaminophen (Tylenol), which does not cause stomach upset. When should you call for help? Call your doctor now or seek immediate medical care if:    · You have new or worse belly pain.     · You are vomiting. Watch closely for changes in your health, and be sure to contact your doctor if:    · You have new or worse symptoms of indigestion.     · You have trouble or pain swallowing.     · You are losing weight.     · You do not get better as expected. Where can you learn more? Go to http://www.gray.com/  Enter [id number] in the search box to learn more about \"Indigestion (Dyspepsia or Heartburn): Care Instructions. \"  Current as of: February 10, 2021               Content Version: 13.0  © 9295-0542 [employer]. Care instructions adapted under license by [employer] (which disclaims liability or warranty for this information).  If you have questions about a medical condition or this instruction, always ask your healthcare professional. [employer] disclaims any warranty or liability for your use of this information.

## 2021-12-22 RX ORDER — LEVOTHYROXINE SODIUM 50 UG/1
50 TABLET ORAL
Qty: 90 TABLET | Refills: 3 | Status: SHIPPED | OUTPATIENT
Start: 2021-12-22

## 2021-12-22 NOTE — PROGRESS NOTES
Called, spoke to pt. Two identifiers confirmed. Pt notified of results/recommendations per Dr. Anita Ziegler. Patient stated that she has tried synthroid in the past and didn't think it worked well for her. Advised patient to try to use it for as few months and if she is having problems call back and we will try to get her back in for an appt. Thyroid bit underactive. Should start low dose synthroid. Rx sent in.

## 2022-01-20 ENCOUNTER — OFFICE VISIT (OUTPATIENT)
Dept: URGENT CARE | Age: 37
End: 2022-01-20
Payer: COMMERCIAL

## 2022-01-20 VITALS — RESPIRATION RATE: 18 BRPM | HEART RATE: 90 BPM | OXYGEN SATURATION: 96 % | TEMPERATURE: 98.5 F

## 2022-01-20 DIAGNOSIS — R05.9 COUGH: ICD-10-CM

## 2022-01-20 DIAGNOSIS — Z20.822 EXPOSURE TO COVID-19 VIRUS: Primary | ICD-10-CM

## 2022-01-20 DIAGNOSIS — R51.9 ACUTE NONINTRACTABLE HEADACHE, UNSPECIFIED HEADACHE TYPE: ICD-10-CM

## 2022-01-20 DIAGNOSIS — J02.9 SORE THROAT: ICD-10-CM

## 2022-01-20 LAB
FLUAV+FLUBV AG NOSE QL IA.RAPID: NEGATIVE
FLUAV+FLUBV AG NOSE QL IA.RAPID: NEGATIVE
S PYO AG THROAT QL: NEGATIVE
VALID INTERNAL CONTROL?: YES
VALID INTERNAL CONTROL?: YES

## 2022-01-20 PROCEDURE — 87880 STREP A ASSAY W/OPTIC: CPT | Performed by: FAMILY MEDICINE

## 2022-01-20 PROCEDURE — 87804 INFLUENZA ASSAY W/OPTIC: CPT | Performed by: FAMILY MEDICINE

## 2022-01-20 PROCEDURE — 99204 OFFICE O/P NEW MOD 45 MIN: CPT | Performed by: FAMILY MEDICINE

## 2022-01-20 RX ORDER — AZITHROMYCIN 250 MG/1
TABLET, FILM COATED ORAL
Qty: 6 TABLET | Refills: 0 | Status: SHIPPED | OUTPATIENT
Start: 2022-01-20 | End: 2022-05-31

## 2022-01-20 RX ORDER — ALBUTEROL SULFATE 0.83 MG/ML
SOLUTION RESPIRATORY (INHALATION)
Qty: 45 NEBULE | Refills: 11 | Status: SHIPPED | OUTPATIENT
Start: 2022-01-20

## 2022-01-20 NOTE — PROGRESS NOTES
This patient was seen at [address] [place] [address] [facility]gent Care while in their vehicle due to COVID-19 pandemic with PPE and focused examination in order to decrease community viral transmission. The patient/guardian gave verbal consent to treat. Stefani Oscar is a 31 y.o. female who presents with fever cough, ST, chest tightness, HA, fatigue; sx started 5 days ago. Was exposed to COVID-19 and strep. Denies N/V/D. Has been using albuterol neb; has hx of asthma. The history is provided by the patient.         Past Medical History:   Diagnosis Date    Abdominal pain, other specified site     Calculus of kidney     RIGHT    Fibromyalgia 06/2016    [facility]    GERD (gastroesophageal reflux disease)     Hydronephrosis     Hypoglycemia, unspecified     Irregular menstrual cycle     Lumbago     Lupus ([facility] [address] 06/2016    [facility]    Migraine with aura, without mention of intractable migraine without mention of status migrainosus     Other ill-defined conditions(799.89)     mitral valve prolapse    Sacroiliitis, not elsewhere classified ([facility] [address]         Past Surgical History:   Procedure Laterality Date    HX ADENOIDECTOMY      HX TONSILLECTOMY      NV COLONOSCOPY FLX DX W/COLLJ SPEC WHEN PFRMD  4/7/2014         NV EGD TRANSORAL BIOPSY SINGLE/MULTIPLE  4/7/2014              Family History   Problem Relation Age of Onset    Cancer Mother     Diabetes Mother     OSTEOARTHRITIS Mother     Thyroid Disease Mother     Migraines Mother     Seizures Mother     Diabetes Maternal Grandmother     Rashes/Skin Problems Maternal Grandmother     Diabetes Maternal Grandfather     Heart Disease Maternal Grandfather     Heart Attack Maternal Grandfather     Hypertension Maternal Grandfather     High Cholesterol Maternal Grandfather     Stroke Maternal Grandfather         Social History     Socioeconomic History    Marital status: SINGLE     Spouse name: Not on file    Number of children: 0    Years of education: Not on file    Highest education level: Not on file   Occupational History     Employer: [employer]   Tobacco Use    Smoking status: Never Smoker    Smokeless tobacco: Never Used   Substance and Sexual Activity    Alcohol use: No    Drug use: No    Sexual activity: Yes     Partners: Male     Birth control/protection: Condom   Other Topics Concern    Not on file   Social History Narrative    Not on file     Social Determinants of Health     Financial Resource Strain:     Difficulty of Paying Living Expenses: Not on file   Food Insecurity:     Worried About Running Out of Food in the Last Year: Not on file    [place] of Food in the Last Year: Not on file   Transportation Needs:     Lack of Transportation (Medical): Not on file    Lack of Transportation (Non-Medical): Not on file   Physical Activity:     Days of Exercise per Week: Not on file    Minutes of Exercise per Session: Not on file   Stress:     Feeling of Stress : Not on file   Social Connections:     Frequency of Communication with Friends and Family: Not on file    Frequency of Social Gatherings with Friends and Family: Not on file    Attends [employer]ist Services: Not on file    Active Member of [address] [place] [address] or Organizations: Not on file    Attends Club or Organization Meetings: Not on file    Marital Status: Not on file   Intimate Partner Violence:     Fear of Current or Ex-Partner: Not on file    Emotionally Abused: Not on file    Physically Abused: Not on file    Sexually Abused: Not on file   Housing Stability:     Unable to Pay for Housing in the Last Year: Not on file    Number of [place] in the Last Year: Not on file    Unstable Housing in the Last Year: Not on file                ALLERGIES: Latex; Clindamycin; Corn; Egg; Milk; Peanut; Sesame seed; Ciprofloxacin; Guaifenesin; Ibuprofen; Influenza virus vaccine, specific; Nystatin; Oxycodone; Qvar [beclomethasone dipropionate];  Shellfish derived; Sulfa (sulfonamide antibiotics); [id number] antimigraine agents; and Wheat    Review of Systems   Constitutional: Positive for fatigue and fever. HENT: Positive for sore throat. Respiratory: Positive for cough and chest tightness. Gastrointestinal: Negative for diarrhea, nausea and vomiting. Vitals:    01/20/22 1304   Pulse: 90   Resp: 18   Temp: 98.5 °F (36.9 °C)   SpO2: 96%       Physical Exam  Vitals and nursing note reviewed. Constitutional:       General: She is not in acute distress. Appearance: She is well-developed. She is not diaphoretic. HENT:      Mouth/Throat:      Mouth: Mucous membranes are moist.      Pharynx: Oropharynx is clear. Posterior oropharyngeal erythema present. No oropharyngeal exudate. Pulmonary:      Effort: Pulmonary effort is normal. No respiratory distress. Breath sounds: Normal breath sounds. No stridor. No wheezing, rhonchi or rales. Lymphadenopathy:      Cervical: Cervical adenopathy present. Neurological:      Mental Status: She is alert. Psychiatric:         Behavior: Behavior normal.         Thought Content: Thought content normal.         Judgment: Judgment normal.         MDM    ICD-10-CM ICD-9-CM   1. Exposure to COVID-19 virus  Z20.822 V01.79   2. Sore throat  J02.9 462   3. Cough  R05.9 786.2   4.  Acute nonintractable headache, unspecified headache type  R51.9 784.0       Orders Placed This Encounter    NOVEL CORONAVIRUS (COVID-19)     Scheduling Instructions:      1) Due to current limited availability of the COVID-19 PCR test, tests will be prioritized and may not be completed.              2) Order only if the test result will change clinical management or necessary for a return to mission-critical employment decision.              3) Print and instruct patient to adhere to [facility] home isolation program. (Link Above)              4) Set up or refer patient for a monitoring program.              5) Have patient sign up for and leverage Lindsay (if not previously done). Order Specific Question:   Is this test for diagnosis or screening? Answer:   Diagnosis of ill patient     Order Specific Question:   Symptomatic for COVID-19 as defined by CDC? Answer:   Yes     Order Specific Question:   Date of Symptom Onset     Answer:   1/16/2022     Order Specific Question:   Hospitalized for COVID-19? Answer:   No     Order Specific Question:   Admitted to ICU for COVID-19? Answer:   No     Order Specific Question:   Employed in healthcare setting? Answer:   Unknown     Order Specific Question:   Resident in a congregate (group) care setting? Answer:   Unknown     Order Specific Question:   Pregnant? Answer:   Unknown     Order Specific Question:   Previously tested for COVID-19? Answer:   Unknown    AMB POC RAPID STREP A    AMB POC DI INFLUENZA A/B TEST    azithromycin (ZITHROMAX) 250 mg tablet     Sig: Take two tablets today then one tablet daily     Dispense:  6 Tablet     Refill:  0    albuterol (PROVENTIL VENTOLIN) 2.5 mg /3 mL (0.083 %) nebu     Sig: USE 1 NEBULE EVERY 4 HRS AS NEEDED     Dispense:  45 Nebule     Refill:  11      Start Zpak  Albuterol prn  Quarantine, await COVID results  Deep breathing exercises, ambulation  Tylenol prn  Increase fluids with electrolytes if decreased PO intake    If signs and symptoms become worse the pt is to go to the ER.      Results for orders placed or performed in visit on 01/20/22   AMB POC RAPID STREP A   Result Value Ref Range    VALID INTERNAL CONTROL POC Yes     Group A Strep Ag Negative Negative   AMB POC DI INFLUENZA A/B TEST   Result Value Ref Range    VALID INTERNAL CONTROL POC Yes     Influenza A Ag POC Negative Negative    Influenza B Ag POC Negative Negative       Procedures

## 2022-01-20 NOTE — LETTER
NOTIFICATION TO RETURN TO WORK / SCHOOL    01/20/22     Ms. Prem Meza   [address] [place] [address]       To Whom It May Concern:    Prem Meza was seen today at [facility]. She is to quarantine and wait test results. If there are questions or concerns please have the patient contact our office.         Sincerely,      Prashant Gaona MD

## 2022-01-22 LAB
SARS-COV-2, NAA 2 DAY TAT: NORMAL
SARS-COV-2, NAA: DETECTED

## 2022-05-31 ENCOUNTER — OFFICE VISIT (OUTPATIENT)
Dept: INTERNAL MEDICINE CLINIC | Age: 37
End: 2022-05-31
Payer: COMMERCIAL

## 2022-05-31 VITALS
RESPIRATION RATE: 14 BRPM | HEART RATE: 65 BPM | OXYGEN SATURATION: 97 % | DIASTOLIC BLOOD PRESSURE: 66 MMHG | SYSTOLIC BLOOD PRESSURE: 103 MMHG | HEIGHT: 65 IN | BODY MASS INDEX: 21.33 KG/M2 | TEMPERATURE: 98.7 F | WEIGHT: 128 LBS

## 2022-05-31 DIAGNOSIS — B88.2 TICK-BORNE DISEASE: Primary | ICD-10-CM

## 2022-05-31 DIAGNOSIS — M79.7 FIBROMYALGIA SYNDROME: ICD-10-CM

## 2022-05-31 DIAGNOSIS — E03.9 ACQUIRED HYPOTHYROIDISM: ICD-10-CM

## 2022-05-31 DIAGNOSIS — M25.50 POLYARTHRALGIA: ICD-10-CM

## 2022-05-31 PROCEDURE — 99214 OFFICE O/P EST MOD 30 MIN: CPT | Performed by: INTERNAL MEDICINE

## 2022-05-31 RX ORDER — PANTOTHENIC ACID (VIT B5) 500 MG
500 TABLET ORAL 2 TIMES DAILY
COMMUNITY

## 2022-05-31 RX ORDER — DOXYCYCLINE 100 MG/1
100 TABLET ORAL 2 TIMES DAILY
Qty: 28 TABLET | Refills: 0 | Status: SHIPPED | OUTPATIENT
Start: 2022-05-31 | End: 2022-06-14

## 2022-05-31 NOTE — PROGRESS NOTES
Eve Landa is a 30 y.o. female who presents for evaluation of tick borne symptoms. Last seen by me dec 21, 2021. She had f/c with viral gastroenteritis onset Asif may 15, and lasted 3-4 days. She tried to advance her diet to have some beef, and she had n/v and then developed hives, which have been intermittent since. A few days later (Thursday may 26), she noticed a tick on the shin of her leg. She was able to remove it completely, but does not know how long it was there. She continues to not be able to eat meat, and continues with intermittent hives and whelts. She has eliminated all meds that have any meat byproducts in them. She had lyme ds 2x as a child. She has been able to work FT, has been doing some naturopathic remedies over past few days. Her mom is with her today.        ROS:  Constitutional: negative for fevers, chills, anorexia and weight loss  Eyes:   negative for visual disturbance and irritation  ENT:   negative for tinnitus,sore throat,nasal congestion,ear pain,hoarseness  Respiratory:  negative for cough, hemoptysis, dyspnea,wheezing  CV:   negative for chest pain, palpitations, lower extremity edema  GI:   negative for nausea, vomiting, diarrhea, abdominal pain,melena  Genitourinary: negative for frequency, dysuria and hematuria  Musculoskel: negative for myalgias, arthralgias, back pain, muscle weakness, joint pain  Neurological:  negative for headaches, dizziness, focal weakness, numbness  Psychiatric:     Negative for depression or anxiety      Past Medical History:   Diagnosis Date    Abdominal pain, other specified site     Calculus of kidney     RIGHT    Fibromyalgia 06/2016    [facility]    GERD (gastroesophageal reflux disease)     Hydronephrosis     Hypoglycemia, unspecified     Irregular menstrual cycle     Lumbago     Lupus ([facility] [address] 06/2016    [facility]    Migraine with aura, without mention of intractable migraine without mention of status migrainosus     Other ill-defined conditions(799.89)     mitral valve prolapse    [facility]        Past Surgical History:   Procedure Laterality Date    HX ADENOIDECTOMY      HX TONSILLECTOMY      OK COLONOSCOPY FLX DX W/COLLJ SPEC WHEN PFRMD  4/7/2014         OK EGD TRANSORAL BIOPSY SINGLE/MULTIPLE  4/7/2014            Family History   Problem Relation Age of Onset    Cancer Mother     Diabetes Mother     OSTEOARTHRITIS Mother     Thyroid Disease Mother    [facility] Migraines Mother     Seizures Mother     Diabetes Maternal Grandmother     Rashes/Skin Problems Maternal Grandmother     Diabetes Maternal Grandfather     Heart Disease Maternal Grandfather     Heart Attack Maternal Grandfather     Hypertension Maternal Grandfather     High Cholesterol Maternal Grandfather     Stroke Maternal Grandfather        Social History     Socioeconomic History    Marital status: SINGLE     Spouse name: Not on file    Number of children: 0    Years of education: Not on file    Highest education level: Not on file   Occupational History     Employer: [employer]   Tobacco Use    Smoking status: Never Smoker    Smokeless tobacco: Never Used   Substance and Sexual Activity    Alcohol use: No    Drug use: No    Sexual activity: Yes     Partners: Male     Birth control/protection: Condom   Other Topics Concern    Not on file   Social History Narrative    Not on file     Social Determinants of Health     Financial Resource Strain:     Difficulty of Paying Living Expenses: Not on file   Food Insecurity:     Worried About Running Out of Food in the Last Year: Not on file    [place] of Food in the Last Year: Not on file   Transportation Needs:     Lack of Transportation (Medical): Not on file    Lack of Transportation (Non-Medical):  Not on file   Physical Activity:     Days of Exercise per Week: Not on file    Minutes of Exercise per Session: Not on file   Stress:     Feeling of Stress : Not on file   Social Connections:     Frequency of Communication with Friends and Family: Not on file    Frequency of Social Gatherings with Friends and Family: Not on file    Attends Temple Services: Not on file    Active Member of Clubs or Organizations: Not on file    Attends Club or Organization Meetings: Not on file    Marital Status: Not on file   Intimate Partner Violence:     Fear of Current or Ex-Partner: Not on file    Emotionally Abused: Not on file    Physically Abused: Not on file    Sexually Abused: Not on file   Housing Stability:     Unable to Pay for Housing in the Last Year: Not on file    Number of [place] in the Last Year: Not on file    Unstable Housing in the Last Year: Not on file            Visit Vitals  /66 (BP 1 Location: Left upper arm, BP Patient Position: Sitting)   Pulse 65   Temp 98.7 °F (37.1 °C) (Temporal)   Resp 14   Ht 5' 5\" (1.651 m)   Wt 128 lb (58.1 kg)   LMP 05/12/2022 (Exact Date)   SpO2 97%   BMI 21.30 kg/m²       Physical Examination:   General - Well appearing female  HEENT - PERRL, TM no erythema/opacification, normal nasal turbinates, no oropharyngeal erythema or exudate, MMM  Neck - supple, no bruits, no thyroidomegaly, no lymphadenopathy  Pulm - clear to auscultation bilaterally  Cardio - RRR, normal S1 S2, no murmur  Abd - soft, nontender, no masses, no HSM  Extrem - no edema, +2 distal pulses. ++dime sized area of erythema on shin  Neuro-  No focal deficits, CN intact     Assessment/Plan:    1. Tick bite--rx for doxycycline. Check lyme titers, alpha GAL, ehrlchicosis titers. 2.  Subclinical hypothyroid--synthroid on hold  Due to concerns of alpha gal and magnesium  3.  fibromayalgia--stable  4. Asthma--continue prn proair    rtc for regular visit.         [place] III, DO

## 2022-05-31 NOTE — PATIENT INSTRUCTIONS
Tick Bite: Care Instructions  Overview     Ticks are small spiderlike animals. They bite to fasten themselves onto your skin and feed on your blood. Ticks can carry diseases. But most ticks do not carry diseases, and most tick bites do not cause serious health problems. Some people may have an allergic reaction to a tick bite. This reaction may be mild, with symptoms like itching and swelling. In rare cases, a severe allergic reaction may occur. Most of the time, all you need to do for a tick bite is relieve any symptoms you may have. Follow-up care is a key part of your treatment and safety. Be sure to make and go to all appointments, and call your doctor if you are having problems. It's also a good idea to know your test results and keep a list of the medicines you take. How can you care for yourself at home? · Put ice or a cold pack on the bite for 15 to 20 minutes once an hour. Put a thin cloth between the ice and your skin. · Try an over-the-counter medicine to relieve itching, redness, swelling, and pain. Be safe with medicines. Read and follow all instructions on the label. ? Take an antihistamine medicine to help relieve itching, redness, and swelling. ? Use a spray of local anesthetic that contains benzocaine, such as Solarcaine. It may help relieve pain. If your skin reacts to the spray, stop using it. ? Put calamine lotion on the skin. It may help relieve itching. To avoid tick bites  · Avoid ticks:  ? Learn where ticks are found in your community, and stay away from those areas if possible. ? Cover as much of your body as possible when you work or play in grassy or wooded areas. ? Use insect repellents, such as products containing DEET. You can spray them on your skin. ? Take steps to control ticks on your property if you live in an area where Lyme disease occurs. Clear leaves, brush, tall grasses, woodpiles, and stone fences from around your house and the edges of your yard or garden. This may help get rid of ticks. · When you come in from outdoors, check your body for ticks, including your groin, head, and underarms. The ticks may be about the size of a sesame seed. If no one else can help you check for ticks on your scalp, comb your hair with a fine-tooth comb. · If you find a tick, remove it quickly. Use tweezers to grasp the tick as close to its mouth (the part in your skin) as possible. Slowly pull the tick straight out--do not twist or yank--until its mouth releases from your skin. If part of the tick stays in the skin, leave it alone. It will likely come out on its own in a few days. · Ticks can come into your house on clothing, outdoor gear, and pets. These ticks can fall off and attach to you. ? Check your clothing and outdoor gear. Remove any ticks you find. Then put your clothing in a clothes dryer on high heat for about 4 minutes to kill any ticks that might remain. ? Check your pets for ticks after they have been outdoors. When should you call for help? Call 911 anytime you think you may need emergency care. For example, call if:    · You have symptoms of a severe allergic reaction. These may include:  ? Sudden raised, red areas (hives) all over your body. ? Swelling of the throat, mouth, lips, or tongue. ? Trouble breathing. ? Passing out (losing consciousness). Or you may feel very lightheaded or suddenly feel weak, confused, or restless. Call your doctor now or seek immediate medical care if:    · You have signs of infection, such as:  ? Increased pain, swelling, warmth, or redness around the bite. ? Red streaks leading from the bite. ? Pus draining from the bite. ? A fever. Watch closely for changes in your health, and be sure to contact your doctor if:    · You develop a new rash.     · You have joint pain.     · You are very tired.     · You have flu-like symptoms.     · You have symptoms for more than 1 week. Where can you learn more?   Go to http://www.gray.com/  Enter [id number] in the search box to learn more about \"Tick Bite: Care Instructions. \"  Current as of: July 1, 2021               Content Version: 13.2  © 2006-2022 Healthwise, [facility]. Care instructions adapted under license by [employer] (which disclaims liability or warranty for this information). If you have questions about a medical condition or this instruction, always ask your healthcare professional. [address] any warranty or liability for your use of this information.

## 2022-06-01 LAB
ANION GAP SERPL CALC-SCNC: 4 MMOL/L (ref 5–15)
BUN SERPL-MCNC: 12 MG/DL (ref 6–20)
BUN/CREAT SERPL: 18 (ref 12–20)
CALCIUM SERPL-MCNC: 9.5 MG/DL (ref 8.5–10.1)
CHLORIDE SERPL-SCNC: 106 MMOL/L (ref 97–108)
CK SERPL-CCNC: 38 U/L (ref 26–192)
CO2 SERPL-SCNC: 27 MMOL/L (ref 21–32)
CREAT SERPL-MCNC: 0.67 MG/DL (ref 0.55–1.02)
CRP SERPL HS-MCNC: 0.4 MG/L
ERYTHROCYTE [SEDIMENTATION RATE] IN BLOOD: 17 MM/HR (ref 0–20)
GLUCOSE SERPL-MCNC: 96 MG/DL (ref 65–100)
POTASSIUM SERPL-SCNC: 4.3 MMOL/L (ref 3.5–5.1)
SODIUM SERPL-SCNC: 137 MMOL/L (ref 136–145)

## 2022-06-01 NOTE — PROGRESS NOTES
So far labs look good. All inflammation tests are normal.  Tick studies/antibodies all still pending.

## 2022-06-02 LAB
A PHAGOCYTOPH IGG TITR SER IF: NEGATIVE {TITER}
A PHAGOCYTOPH IGM TITR SER IF: NEGATIVE {TITER}
E CHAFFEENSIS IGG TITR SER IF: NEGATIVE {TITER}
E CHAFFEENSIS IGM TITR SER IF: NEGATIVE {TITER}

## 2022-06-06 NOTE — PROGRESS NOTES
Sent patient message via [employer], still a couple tests pending     The antibodies for Ehrlichia tick is negative.  Lyme and alpha GAL still pending.

## 2022-06-14 ENCOUNTER — TELEPHONE (OUTPATIENT)
Dept: INTERNAL MEDICINE CLINIC | Age: 37
End: 2022-06-14

## 2022-06-15 DIAGNOSIS — E03.9 ACQUIRED HYPOTHYROIDISM: Primary | ICD-10-CM

## 2022-06-16 LAB
B BURGDOR IGG PATRN SER IB-IMP: NEGATIVE
B BURGDOR IGM PATRN SER IB-IMP: NEGATIVE
B BURGDOR18KD IGG SER QL IB: NORMAL
B BURGDOR23KD IGG SER QL IB: NORMAL
B BURGDOR23KD IGM SER QL IB: NORMAL
B BURGDOR28KD IGG SER QL IB: NORMAL
B BURGDOR30KD IGG SER QL IB: NORMAL
B BURGDOR39KD IGG SER QL IB: NORMAL
B BURGDOR39KD IGM SER QL IB: NORMAL
B BURGDOR41KD IGG SER QL IB: NORMAL
B BURGDOR41KD IGM SER QL IB: NORMAL
B BURGDOR45KD IGG SER QL IB: NORMAL
B BURGDOR58KD IGG SER QL IB: NORMAL
B BURGDOR66KD IGG SER QL IB: NORMAL
B BURGDOR93KD IGG SER QL IB: NORMAL

## 2022-06-16 NOTE — PROGRESS NOTES
Called, spoke to pt. Two identifiers confirmed. Pt notified of results/recommendations per Dr. Sarah Stevenson. Pt verbalized understanding of information discussed w/ no further questions at this time. Lyme titers all negative.

## 2022-07-20 LAB
ALPHA-GAL IGE QN: <0.1 KU/L
BEEF IGE QN: <0.1 KU/L
CLASS DESCRIPTION, 600268: NORMAL
IGE SERPL-ACNC: 259 IU/ML (ref 6–495)
LAMB/MUTTON (OVIS SPP) IGE: <0.1 KU/L
PORK IGE QN: <0.1 KU/L

## 2022-07-21 ENCOUNTER — TELEPHONE (OUTPATIENT)
Dept: INTERNAL MEDICINE CLINIC | Age: 37
End: 2022-07-21

## 2022-07-21 NOTE — TELEPHONE ENCOUNTER
Unable to LM / VM full    Pt needs to r/s 10/21 apt with Dr Skyler Causey.  Provider will not be in the office

## 2022-07-25 NOTE — PROGRESS NOTES
Sent patient message via [employer], verified active    Alpha gal antibodies all negative.  Should be able to resume eating meatproducts.

## 2022-07-28 ENCOUNTER — LAB ONLY (OUTPATIENT)
Dept: INTERNAL MEDICINE CLINIC | Age: 37
End: 2022-07-28

## 2022-07-28 DIAGNOSIS — E03.9 ACQUIRED HYPOTHYROIDISM: ICD-10-CM

## 2022-07-29 LAB
T4 FREE SERPL-MCNC: 1.1 NG/DL (ref 0.8–1.5)
TSH SERPL DL<=0.05 MIU/L-ACNC: 1.12 UIU/ML (ref 0.36–3.74)

## 2022-08-06 ENCOUNTER — OFFICE VISIT (OUTPATIENT)
Dept: URGENT CARE | Age: 37
End: 2022-08-06
Payer: COMMERCIAL

## 2022-08-06 VITALS
HEART RATE: 84 BPM | RESPIRATION RATE: 16 BRPM | DIASTOLIC BLOOD PRESSURE: 76 MMHG | BODY MASS INDEX: 20.57 KG/M2 | SYSTOLIC BLOOD PRESSURE: 108 MMHG | HEIGHT: 66 IN | TEMPERATURE: 98.9 F | WEIGHT: 128 LBS | OXYGEN SATURATION: 97 %

## 2022-08-06 DIAGNOSIS — B34.9 VIRAL ILLNESS: Primary | ICD-10-CM

## 2022-08-06 DIAGNOSIS — R05.9 COUGH: ICD-10-CM

## 2022-08-06 LAB
FLUAV+FLUBV AG NOSE QL IA.RAPID: NEGATIVE
FLUAV+FLUBV AG NOSE QL IA.RAPID: NEGATIVE
SARS-COV-2 PCR, POC: POSITIVE
VALID INTERNAL CONTROL?: YES

## 2022-08-06 PROCEDURE — 87635 SARS-COV-2 COVID-19 AMP PRB: CPT | Performed by: NURSE PRACTITIONER

## 2022-08-06 PROCEDURE — 87804 INFLUENZA ASSAY W/OPTIC: CPT | Performed by: NURSE PRACTITIONER

## 2022-08-06 PROCEDURE — 99214 OFFICE O/P EST MOD 30 MIN: CPT | Performed by: NURSE PRACTITIONER

## 2022-08-06 RX ORDER — AZITHROMYCIN 250 MG/1
TABLET, FILM COATED ORAL
Qty: 6 TABLET | Refills: 0 | Status: SHIPPED | OUTPATIENT
Start: 2022-08-06 | End: 2022-08-11

## 2022-08-06 RX ORDER — BENZONATATE 200 MG/1
200 CAPSULE ORAL
Qty: 21 CAPSULE | Refills: 0 | Status: SHIPPED | OUTPATIENT
Start: 2022-08-06 | End: 2022-08-13

## 2022-08-06 NOTE — LETTER
NOTIFICATION RETURN TO WORK / SCHOOL    8/6/2022 9:21 AM    Ms. Ethel DOMINGUEZ [address] [address].      To Whom It May Concern:    Fernando Treviño is currently under the care of [address]. She will return to work/school on: 8/12/22. If there are questions or concerns please have the patient contact our office.         Sincerely,      E PROVIDER

## 2022-08-06 NOTE — PATIENT INSTRUCTIONS
Follow up with your primary care provider if symptoms persist.  Go to the Emergency Department with development of any acute symptoms. Study Result    Narrative & Impression   EXAM:  XR CHEST PA LAT     INDICATION:   Cough     COMPARISON: 2019. FINDINGS: PA and lateral radiographs of the chest demonstrate clear lungs. The  cardiac and mediastinal contours and pulmonary vascularity are normal.  The  bones and soft tissues are within normal limits.      IMPRESSION  No acute process

## 2022-08-06 NOTE — PROGRESS NOTES
Yoly Ortiz is a 30 y.o. female with hx pneumonia and allergies presenting to clinic today with fever (101.7*F last night), body aches, productive cough, sore throat, and right ear pain that has been ongoing for 5 days. She states that she was exposed to Plasmon at work on 8/2/22. Has not been vaccinated for COVID due to anaphylactic reaction to a component of the flu vaccine. Reports that she has very quickly escalated in the past to pneumonia and wants to rule this out today. No other complaints. The history is provided by the patient. History limited by: nothing.         Past Medical History:   Diagnosis Date    Abdominal pain, other specified site     Calculus of kidney     RIGHT    Fibromyalgia 06/2016    [facility]    GERD (gastroesophageal reflux disease)     Hydronephrosis     Hypoglycemia, unspecified     Irregular menstrual cycle     Lumbago     Lupus ([address] 06/2016    [facility]    Migraine with aura, without mention of intractable migraine without mention of status migrainosus     Other ill-defined conditions(799.89)     mitral valve prolapse    Sacroiliitis, not elsewhere classified ([facility] [address]         Past Surgical History:   Procedure Laterality Date    HX ADENOIDECTOMY      HX TONSILLECTOMY      DE COLONOSCOPY FLX DX W/COLLJ SPEC WHEN PFRMD  4/7/2014         DE EGD TRANSORAL BIOPSY SINGLE/MULTIPLE  4/7/2014              Family History   Problem Relation Age of Onset    Cancer Mother     Diabetes Mother     OSTEOARTHRITIS Mother     Thyroid Disease Mother     Migraines Mother     Seizures Mother     Diabetes Maternal Grandmother     Rashes/Skin Problems Maternal Grandmother     Diabetes Maternal Grandfather     Heart Disease Maternal Grandfather     Heart Attack Maternal Grandfather     Hypertension Maternal Grandfather     High Cholesterol Maternal Grandfather     Stroke Maternal Grandfather         Social History     Socioeconomic History    Marital status: SINGLE     Spouse name: Not on file    Number of children: 0    Years of education: Not on file    Highest education level: Not on file   Occupational History     Employer: [employer]   Tobacco Use    Smoking status: Never    Smokeless tobacco: Never   Substance and Sexual Activity    Alcohol use: No    Drug use: No    Sexual activity: Yes     Partners: Male     Birth control/protection: Condom   Other Topics Concern    Not on file   Social History Narrative    Not on file     Social Determinants of Health     Financial Resource Strain: Not on file   Food Insecurity: Not on file   Transportation Needs: Not on file   Physical Activity: Not on file   Stress: Not on file   Social Connections: Not on file   Intimate Partner Violence: Not on file   Housing Stability: Not on file                ALLERGIES: Latex; Clindamycin; Corn; Egg; Milk; Peanut; Sesame seed; Ciprofloxacin; Guaifenesin; Ibuprofen; Influenza virus vaccine, specific; Nystatin; Oxycodone; Qvar [beclomethasone dipropionate]; Shellfish derived; Sulfa (sulfonamide antibiotics); [id number] antimigraine agents; and Wheat    Review of Systems   Constitutional:  Positive for fever. Negative for chills. HENT:  Positive for ear pain and sore throat. Negative for congestion, rhinorrhea and sneezing. Respiratory:  Positive for cough. Negative for chest tightness, shortness of breath and wheezing. Cardiovascular:  Negative for chest pain. Musculoskeletal:  Positive for myalgias. Negative for back pain. Vitals:    08/06/22 0821   BP: 108/76   Pulse: 84   Resp: 16   Temp: 98.9 °F (37.2 °C)   SpO2: 97%   Weight: 128 lb (58.1 kg)   Height: 5' 6\" (1.676 m)       Physical Exam  Vitals and nursing note reviewed. Constitutional:       General: She is not in acute distress. Appearance: Normal appearance. She is not ill-appearing, toxic-appearing or diaphoretic. HENT:      Head: Normocephalic and atraumatic.       Ears:      Comments: BL TM pearly gray, no erythema, no effusion, no bulging  Tonsils 2+BL, no erythema, no exudate, uvula midline. Overall good dentition. No visible nasal congestion. No obvious palpable lymphadenopathy. Eyes:      Extraocular Movements: Extraocular movements intact. Conjunctiva/sclera: Conjunctivae normal.   Cardiovascular:      Rate and Rhythm: Normal rate. Heart sounds: Normal heart sounds. Pulmonary:      Effort: Pulmonary effort is normal. No respiratory distress. Comments: Speaking in complete sentences without difficulty. Lungs diminished throughout. Musculoskeletal:      Cervical back: Normal range of motion. Neurological:      Mental Status: She is alert. Psychiatric:         Mood and Affect: Mood normal.         Behavior: Behavior normal.       MDM  Results for orders placed or performed in visit on 08/06/22   POCT COVID-19, SARS-COV-2, PCR   Result Value Ref Range    SARS-COV-2 PCR, POC Positive (A) Negative   AMB POC DI INFLUENZA A/B TEST   Result Value Ref Range    VALID INTERNAL CONTROL POC Yes     Influenza A Ag POC Negative Negative    Influenza B Ag POC Negative Negative     XR Results (most recent):  Results from Appointment encounter on 08/06/22    XR CHEST PA LAT    Narrative  EXAM:  XR CHEST PA LAT    INDICATION:   Cough    COMPARISON: 2019. FINDINGS: PA and lateral radiographs of the chest demonstrate clear lungs. The  cardiac and mediastinal contours and pulmonary vascularity are normal.  The  bones and soft tissues are within normal limits. Impression  No acute process    PLAN:  Patient presents to clinic today with URI symptoms. Afebrile, nontoxic appearing, lungs diminished today. COVID POSITIVE, patient aware. Patient to quarantine per current CDC guidelines. Flu negative  CXR with no acute process  Discussed possibly prescribing paxlovid to patient due to her pulmonary history.  She declined due to allergy fears and requested azithromycin, as this was prescribed the last time she had COVID and she had good results. Rx azithromycin. Rx tessalon perles as needed for cough. Follow up with PCP if symptoms pereist.  Go to ED with development of any acute symptoms. DIAGNOSES:    ICD-10-CM ICD-9-CM    1. Viral illness  B34.9 079.99       2. Cough  R05.9 786.2 POCT COVID-19, SARS-COV-2, PCR      AMB POC DI INFLUENZA A/B TEST      XR CHEST PA LAT        Medications Ordered Today   Medications    benzonatate (TESSALON) 200 mg capsule     Sig: Take 1 Capsule by mouth three (3) times daily as needed for Cough for up to 7 days. Dispense:  21 Capsule     Refill:  0    azithromycin (ZITHROMAX) 250 mg tablet     Sig: Take 2 tablets today, then take 1 tablet daily     Dispense:  6 Tablet     Refill:  0     The patients condition was discussed with the patient and they understand. The patient is to follow up with primary care doctor ,If signs and symptoms become worse the pt is to go to the ER. The patient is to take medications as prescribed.                  Procedures

## 2022-11-17 ENCOUNTER — DOCUMENTATION ONLY (OUTPATIENT)
Dept: INTERNAL MEDICINE CLINIC | Age: 37
End: 2022-11-17

## 2022-11-17 NOTE — PROGRESS NOTES
Patient presents to the office to drop off Vaccine Exemption form for Allison De Jesus DO. Patient advised of 7-10 business day turnaround time for form completion & may incur a fee of $25.00. This has been fully explained to the patient, who indicates understanding.      Pt requests call once completed

## 2022-11-28 ENCOUNTER — OFFICE VISIT (OUTPATIENT)
Dept: INTERNAL MEDICINE CLINIC | Age: 37
End: 2022-11-28
Payer: COMMERCIAL

## 2022-11-28 VITALS
HEART RATE: 73 BPM | TEMPERATURE: 98.7 F | RESPIRATION RATE: 14 BRPM | HEIGHT: 66 IN | DIASTOLIC BLOOD PRESSURE: 64 MMHG | OXYGEN SATURATION: 97 % | WEIGHT: 129.8 LBS | BODY MASS INDEX: 20.86 KG/M2 | SYSTOLIC BLOOD PRESSURE: 98 MMHG

## 2022-11-28 DIAGNOSIS — R10.13 DYSPEPSIA: ICD-10-CM

## 2022-11-28 DIAGNOSIS — M79.7 FIBROMYALGIA SYNDROME: ICD-10-CM

## 2022-11-28 DIAGNOSIS — J45.20 MILD INTERMITTENT ASTHMA WITHOUT COMPLICATION: ICD-10-CM

## 2022-11-28 DIAGNOSIS — E03.8 SUBCLINICAL HYPOTHYROIDISM: ICD-10-CM

## 2022-11-28 DIAGNOSIS — Z00.00 ANNUAL PHYSICAL EXAM: Primary | ICD-10-CM

## 2022-11-28 PROCEDURE — 99395 PREV VISIT EST AGE 18-39: CPT | Performed by: INTERNAL MEDICINE

## 2022-11-28 RX ORDER — CETIRIZINE HYDROCHLORIDE 10 MG/1
10 TABLET ORAL DAILY
COMMUNITY

## 2022-11-28 RX ORDER — MULTIVIT WITH IRON,MINERALS
99 TABLET ORAL AS NEEDED
COMMUNITY

## 2022-11-28 NOTE — PROGRESS NOTES
1. \"Have you been to the ER, urgent care clinic since your last visit? Hospitalized since your last visit? \" Yes covid in august, [facility]    2. \"Have you seen or consulted any other health care providers outside of the [address] [place] [address] since your last visit? \" No     3. For patients aged 39-70: Has the patient had a colonoscopy / FIT/ Cologuard? NA - based on age      If the patient is female:    4. For patients aged 41-77: Has the patient had a mammogram within the past 2 years? NA - based on age or sex      11. For patients aged 21-65: Has the patient had a pap smear?  Yes - no Care Gap present

## 2022-11-28 NOTE — PROGRESS NOTES
Eulogio Leblanc is a 30 y.o. female who presents for evaluation of annual cpe. Last seen by me may 31, 2022 for fevers with n/v, she was worried that was due to tick borne flare. All labs and workup was negative. She has since stopped taking her synthroid, and she is feeling and doing much better. She recently closed on her new house, which she had built as well. Did have covid infection in august, and has needed to use her proair mdi more often since then.       ROS:  Constitutional: negative for fevers, chills, anorexia and weight loss  Eyes:   negative for visual disturbance and irritation  ENT:   negative for tinnitus,sore throat,nasal congestion,ear pain,hoarseness  Respiratory:  negative for cough, hemoptysis, dyspnea,wheezing  CV:   negative for chest pain, palpitations, lower extremity edema  GI:   negative for nausea, vomiting, diarrhea, abdominal pain,melena  Genitourinary: negative for frequency, dysuria and hematuria  Musculoskel: negative for myalgias, arthralgias, back pain, muscle weakness, joint pain  Neurological:  negative for headaches, dizziness, focal weakness, numbness  Psychiatric:     Negative for depression or anxiety      Past Medical History:   Diagnosis Date    Abdominal pain, other specified site     Asthma 2018    Calculus of kidney     RIGHT    Fibromyalgia 06/2016    [facility]    GERD (gastroesophageal reflux disease)     Hydronephrosis     Hypoglycemia, unspecified     Irregular menstrual cycle     Lumbago     Lupus ([facility] [address] 06/2016    [facility]    Migraine with aura, without mention of intractable migraine without mention of status migrainosus     Other ill-defined conditions(799.89)     mitral valve prolapse    Sacroiliitis, not elsewhere classified ([address]     Thyroid disease        Past Surgical History:   Procedure Laterality Date    HX ADENOIDECTOMY      HX TONSILLECTOMY      NV COLONOSCOPY FLX DX W/COLLJ SPEC WHEN PFRMD  4/7/2014         NV EGD TRANSORAL BIOPSY SINGLE/MULTIPLE  2014            Family History   Problem Relation Age of Onset    Cancer Mother         Ovarian & Uteran    Diabetes Mother     OSTEOARTHRITIS Mother     Thyroid Disease Mother     Migraines Mother         Migraines for 20+years    Seizures Mother     Angelica Pena Mother         she had a cholestectomy    Diabetes Maternal Grandmother     Rashes/Skin Problems Maternal Grandmother     Diabetes Maternal Grandfather     Heart Disease Maternal Grandfather         /heart attack    Heart Attack Maternal Grandfather     Hypertension Maternal Grandfather     High Cholesterol Maternal Grandfather     Stroke Maternal Grandfather     Bleeding Prob Sister         Focal dystonia & Eahlers Danlos       Social History     Socioeconomic History    Marital status: SINGLE     Spouse name: Not on file    Number of children: 0    Years of education: Not on file    Highest education level: Not on file   Occupational History     Employer: [employer]   Tobacco Use    Smoking status: Never    Smokeless tobacco: Never   Substance and Sexual Activity    Alcohol use: Never    Drug use: Never    Sexual activity: Yes     Partners: Male     Birth control/protection: Condom   Other Topics Concern    Not on file   Social History Narrative    Not on file     Social Determinants of Health     Financial Resource Strain: Low Risk     Difficulty of Paying Living Expenses: Not hard at all   Food Insecurity: No Food Insecurity    Worried About Running Out of Food in the Last Year: Never true    Ran Out of Food in the Last Year: Never true   Transportation Needs: Not on file   Physical Activity: Not on file   Stress: Not on file   Social Connections: Not on file   Intimate Partner Violence: Not on file   Housing Stability: Not on file          Visit Vitals  BP 98/64 (BP 1 Location: Left upper arm, BP Patient Position: Sitting)   Pulse 73   Temp 98.7 °F (37.1 °C) (Temporal)   Resp 14   Ht 5' 6\" (1.676 m)   Wt 129 lb 12.8 oz (58.9 kg)   LMP 11/16/2022   SpO2 97%   BMI 20.95 kg/m²       Physical Examination:   General - Well appearing female  HEENT - PERRL, TM no erythema/opacification, normal nasal turbinates, no oropharyngeal erythema or exudate, MMM  Neck - supple, no bruits, no thyroidomegaly, no lymphadenopathy  Pulm - clear to auscultation bilaterally  Cardio - RRR, normal S1 S2, no murmur  Abd - soft, nontender, no masses, no HSM  Extrem - no edema, +2 distal pulses  Neuro-  No focal deficits, CN intact     Assessment/Plan:     Annual cpe--labs done recently are good. No need to recheck today  Subclinical hypothyroid--stopped synthroid, and has felt better   Asthma--has prn proair. Bit worse since had covid in august 2022  Fibromyalgia--continue regular exercise    Declines tdap due to severe allergy to flu shot  Rtc one year.         Amanda Barajas III, DO

## 2023-02-12 ENCOUNTER — HOSPITAL ENCOUNTER (EMERGENCY)
Age: 38
Discharge: HOME | End: 2023-02-12
Attending: EMERGENCY MEDICINE
Payer: COMMERCIAL

## 2023-02-12 ENCOUNTER — NURSE TRIAGE (OUTPATIENT)
Dept: OTHER | Facility: CLINIC | Age: 38
End: 2023-02-12

## 2023-02-12 VITALS
OXYGEN SATURATION: 100 % | RESPIRATION RATE: 14 BRPM | DIASTOLIC BLOOD PRESSURE: 78 MMHG | HEIGHT: 66 IN | TEMPERATURE: 97.9 F | HEART RATE: 73 BPM | BODY MASS INDEX: 21.79 KG/M2 | SYSTOLIC BLOOD PRESSURE: 123 MMHG | WEIGHT: 135.58 LBS

## 2023-02-12 DIAGNOSIS — M54.12 CERVICAL RADICULOPATHY AT C5: Primary | ICD-10-CM

## 2023-02-12 DIAGNOSIS — S16.1XXA STRAIN OF NECK MUSCLE, INITIAL ENCOUNTER: ICD-10-CM

## 2023-02-12 PROCEDURE — 99283 EMERGENCY DEPT VISIT LOW MDM: CPT

## 2023-02-12 RX ORDER — NAPROXEN 500 MG/1
500 TABLET ORAL 2 TIMES DAILY WITH MEALS
Qty: 20 TABLET | Refills: 0 | Status: SHIPPED | OUTPATIENT
Start: 2023-02-12 | End: 2023-02-22

## 2023-02-12 RX ORDER — CYCLOBENZAPRINE HCL 10 MG
10 TABLET ORAL
Qty: 30 TABLET | Refills: 0 | Status: SHIPPED | OUTPATIENT
Start: 2023-02-12

## 2023-02-12 RX ORDER — PREDNISONE 10 MG/1
TABLET ORAL
Qty: 42 TABLET | Refills: 0 | Status: SHIPPED | OUTPATIENT
Start: 2023-02-12

## 2023-02-12 NOTE — DISCHARGE INSTRUCTIONS
It was a pleasure taking care of you at [facility] Emergency Department today. We know that when you come to [facility], you are entrusting us with your health, comfort, and safety. Our physicians and nurses honor that trust, and we truly appreciate the opportunity to care for you and your loved ones. We also value your feedback. If you receive a survey about your Emergency Department experience today, please fill it out. We care about our patients' feedback, and we listen to what you have to say. Thank you!

## 2023-02-12 NOTE — Clinical Note
[address]  [facility] EMERGENCY DEPT  [address]  [place] [address]  [phone]    Work/School Note    Date: 2/12/2023    To Whom It May concern:    Bryan Manzo was seen and treated today in the emergency room by the following provider(s):  Attending Provider: Tari Andrade MD.      Bryan Manzo is excused from work/school on 2/12/2023 through 2/14/2023. She is medically clear to return to work/school on 2/15/2023.          Sincerely,          mAy Cardoso MD

## 2023-02-12 NOTE — TELEPHONE ENCOUNTER
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 I was pulling the pt. I felt like a rubber band gave away. It felt like a muscle pop in my L side of my neck. The pain goes L side of neck into the shoulder and clavicle area, down the L side of back and into L hip. The back of my head hurts - I get migraines, and am wondering if the pressure is causing a headache. \"     Pain Severity:  5 out of 10    What has been tried: ice     Recommended disposition: Go to ED Now    Employee injury packet sent to employee with listing of approved providers and locations. Employee aware they must be seen by one of the panel physicians, not their own PCP. Employee verbalizes understanding. Care advice provided, employee verbalizes understanding; denies any other questions or concerns; instructed to call back for any new or worsening symptoms. Associate states understanding and will go to ED now.   Understands that an injury packet will be emailed to her email address    Reason for Disposition   [1] Numbness, tingling, or burning of arms, upper back/chest or legs AND [2] not present now (i.e., completely resolved)    Protocols used: Neck Injury-ADULT-

## 2023-02-12 NOTE — ED NOTES
Pt discharged by Dr Sally Berman. Discharge instructions discussed and pt given opportunity to ask questions.  Pt ambulatory out of ED

## 2023-02-12 NOTE — ED PROVIDER NOTES
[facility] EMERGENCY DEPT  EMERGENCY DEPARTMENT ENCOUNTER       Pt Name: Nanci Davis  MRN: [id number]  [place] 1985  Date of evaluation: 2/12/2023  Provider: Gabriela Powell MD   PCP: Amparo Mcdaniel DO  Note Started: 8:24 AM 2/12/23     CHIEF COMPLAINT       Chief Complaint   Patient presents with    Neck Pain     Pulled a neck muscle yesterday at work , pulling on a very heavy patient. Woke up with pain radiating from same area to upper left clavicle area and down the upper back on left side and into the left hip. Advise to come to ED, from Associate services. HISTORY OF PRESENT ILLNESS: 1 or more elements      History From: Patient  HPI Limitations : None     Nanci Davis is a 30 y.o. female who presents presenting with complaints of neck pain since yesterday at work. Patient reports she was moving a heavy patient and suddenly strained and felt pain severely along the left side of the neck rating to the left clavicle and down the left side of the back. It is worse with movement and better at rest.  Patient denies any direct trauma, any chest pain or trouble breathing or any other associated symptoms. Nursing Notes were all reviewed and agreed with or any disagreements were addressed in the HPI. REVIEW OF SYSTEMS      Review of Systems     Positives and Pertinent negatives as per HPI.     PAST HISTORY     Past Medical History:  Past Medical History:   Diagnosis Date    Abdominal pain, other specified site     Asthma 2018    Calculus of kidney     RIGHT    Fibromyalgia 06/2016    [facility]    GERD (gastroesophageal reflux disease)     Hydronephrosis     Hypoglycemia, unspecified     Irregular menstrual cycle     Lumbago     Lupus ([facility] [address] 06/2016    [facility]    Migraine with aura, without mention of intractable migraine without mention of status migrainosus     Other ill-defined conditions(799.89)     mitral valve prolapse    Sacroiliitis, not elsewhere classified ([facility]     Thyroid disease        Past Surgical History:  Past Surgical History:   Procedure Laterality Date    HX ADENOIDECTOMY      HX TONSILLECTOMY      NV COLONOSCOPY FLX DX W/COLLJ SPEC WHEN PFRMD  2014         NV EGD TRANSORAL BIOPSY SINGLE/MULTIPLE  2014            Family History:  Family History   Problem Relation Age of Onset    Cancer Mother         Ovarian & [place]    Diabetes Mother     OSTEOARTHRITIS Mother     Thyroid Disease Mother     Migraines Mother         Migraines for 20+years    Seizures Mother     Gall Bladder Disease Mother         she had a cholestectomy    Diabetes Maternal Grandmother     Rashes/Skin Problems Maternal Grandmother     Diabetes Maternal Grandfather     Heart Disease Maternal Grandfather         /heart attack    Heart Attack Maternal Grandfather     Hypertension Maternal Grandfather     High Cholesterol Maternal Grandfather     Stroke Maternal Grandfather     Bleeding Prob Sister         Focal dystonia & Eahlers Danlos       Social History:  Social History     Tobacco Use    Smoking status: Never    Smokeless tobacco: Never   Substance Use Topics    Alcohol use: Never    Drug use: Never       Allergies:   Allergies   Allergen Reactions    Latex Hives    Clindamycin Anaphylaxis    Corn Anaphylaxis    Egg Anaphylaxis    Milk Anaphylaxis    Peanut Anaphylaxis    Sesame Seed Anaphylaxis    Ciprofloxacin Other (comments)    Guaifenesin Other (comments)     Gained weight    Ibuprofen Other (comments)     \"started burning really bad\"    Influenza Virus Vaccine, Specific Other (comments)     Quadrivalent   Severe HA and neurological symptoms     Nystatin Hives    Oxycodone Other (comments)    Qvar [Beclomethasone Dipropionate] Hives    Shellfish Derived Rash    Sulfa (Sulfonamide Antibiotics) Hives and Other (comments)     achy    [id number] Antimigraine Agents Palpitations     Chest pain    Wheat Other (comments)     Mouth bleeds       CURRENT MEDICATIONS      Previous Medications    ALBUTEROL (PROVENTIL VENTOLIN) 2.5 MG /3 ML (0.083 %) NEBU    USE 1 NEBULE EVERY 4 HRS AS NEEDED    B COMPLEX VITAMINS TABLET    Take 1 Tab by mouth daily. C/SOURCHERRY/CELERY/GRAPE SEED (TART CHERRY PO)    Take 1 Capsule by mouth daily. CETIRIZINE (ZYRTEC) 10 MG TABLET    Take 10 mg by mouth daily. EPINEPHRINE (EPIPEN) 0.3 MG/0.3 ML INJECTION    0.3 mL by IntraMUSCular route as needed for Anaphylaxis. LACTOBACILLUS ACIDOPHILUS (PROBIOTIC PO)    Take 2 Capsules by mouth daily. LEVOTHYROXINE (SYNTHROID) 50 MCG TABLET    Take 1 Tablet by mouth Daily (before breakfast). MAGNESIUM PO    Take  by mouth. PANTOTHENIC ACID, VIT B5, 500 MG TAB    Take 500 mg by mouth two (2) times a day. POTASSIUM GLUCONATE 2.5 MEQ TAB    Take 99 mg by mouth as needed. PROAIR HFA 90 MCG/ACTUATION INHALER    INHALE 2 PUFFS EVERY 4 HOURS AS NEEDED. MAY USE 2 PUFFS 20-30 MINUTES BEFORE PHYSICAL EXERTION    PYRIDOXINE HCL, VITAMIN B6, (VITAMIN B-6 PO)    Take  by mouth daily. QUERCETIN DIHYDRATE, BULK,    Take 1 Capsule by mouth nightly. PHYSICAL EXAM      ED Triage Vitals [02/12/23 0803]   ED Encounter Vitals Group      /78      Pulse (Heart Rate) 73      Resp Rate 14      Temp 97.9 °F (36.6 °C)      Temp src       O2 Sat (%) 100 %      Weight 135 lb 9.3 oz      Height 5' 6\"        Physical Exam  Vitals and nursing note reviewed. Constitutional:       General: She is not in acute distress. Appearance: She is well-developed. She is not ill-appearing. HENT:      Head: Normocephalic and atraumatic. Mouth/Throat:      Pharynx: No oropharyngeal exudate. Eyes:      Conjunctiva/sclera: Conjunctivae normal.      Pupils: Pupils are equal, round, and reactive to light. Cardiovascular:      Rate and Rhythm: Normal rate and regular rhythm. Heart sounds: Normal heart sounds. No murmur heard.   Pulmonary:      Effort: Pulmonary effort is normal. No tachypnea or accessory muscle usage. Breath sounds: Normal breath sounds. No wheezing or rales. Abdominal:      General: Bowel sounds are normal. There is no distension. Palpations: Abdomen is soft. Tenderness: There is no abdominal tenderness. Musculoskeletal:         General: No deformity. Normal range of motion. Cervical back: Normal range of motion and neck supple. Back:       Comments: Patient is tenderness to palpation, mild muscle spasm on the superior trapezius muscle, left paracervical muscles, but no midline tenderness. Skin:     General: Skin is warm. Findings: No rash. Neurological:      Mental Status: She is alert and oriented to person, place, and time. Sensory: No sensory deficit. Coordination: Coordination normal.      Comments: Symmetric smile. No facial droop. Patient has normal sensation and motor function C5-T1 bilaterally. Psychiatric:         Behavior: Behavior normal.          DIAGNOSTIC RESULTS   LABS:    No results found for this or any previous visit (from the past 12 hour(s)). EKG:      RADIOLOGY:  Non-plain film images such as CT, Ultrasound and MRI are read by the radiologist. Plain radiographic images are visualized and preliminarily interpreted by the ED Provider with the below findings:        Interpretation per the Radiologist below, if available at the time of this note:     No results found.       PROCEDURES   Unless otherwise noted below, none  Procedures     CRITICAL CARE TIME       EMERGENCY DEPARTMENT COURSE and DIFFERENTIAL DIAGNOSIS/MDM   Vitals:    Vitals:    02/12/23 0803   BP: 123/78   Pulse: 73   Resp: 14   Temp: 97.9 °F (36.6 °C)   SpO2: 100%   Weight: 61.5 kg (135 lb 9.3 oz)   Height: 5' 6\" (1.676 m)        Patient was given the following medications:  Medications - No data to display    CONSULTS: (Who and What was discussed)  None    Chronic Conditions:     Social Determinants affecting Dx or Tx: None Records Reviewed (source and summary of external notes): None    CC/HPI Summary, DDx, ED Course, and Reassessment: Patient is a 80-year-old female presenting with symptoms of acute neck pain after heavy lift yesterday. Considered cervical spine fracture, acute herniated disc, however patient has normal neurologic exam in the upper extremities, no signs of cord impingement. At this time I risk of radiation far exceeds is the value of any CT scan at this time. We will treat for musculoskeletal cervical strain, standard therapy, outpatient follow-up, return precautions given. Disposition Considerations (Tests not done, Shared Decision Making, Pt Expectation of Test or Tx.):      FINAL IMPRESSION     1. Cervical radiculopathy at C5    2. Strain of neck muscle, initial encounter          DISPOSITION/PLAN   Discharged    Discharge Note: The patient is stable for discharge home. The signs, symptoms, diagnosis, and discharge instructions have been discussed, understanding conveyed, and agreed upon. The patient is to follow up as recommended or return to ER should their symptoms worsen. PATIENT REFERRED TO:  Follow-up Information       Follow up With Specialties Details Why Contact Shaun Epstein DO Internal Medicine Physician In 1 week For a follow-up evaluation. [address] [facility]  [phone]      [facility] EMERGENCY DEPT Emergency Medicine In 2 days If symptoms worsen [address] [place] [address]  [phone]              DISCHARGE MEDICATIONS:  Current Discharge Medication List        START taking these medications    Details   naproxen (Naprosyn) 500 mg tablet Take 1 Tablet by mouth two (2) times daily (with meals) for 10 days. Qty: 20 Tablet, Refills: 0  Start date: 2/12/2023, End date: 2/22/2023      cyclobenzaprine (FLEXERIL) 10 mg tablet Take 1 Tablet by mouth three (3) times daily (with meals).   Qty: 30 Tablet, Refills: 0  Start date: 2/12/2023      predniSONE (STERAPRED DS) 10 mg dose pack Take 5tab(50mg)x3days, 4tab(40mg)x3days, 3tab(30mg)x3days, 2tab(20mg)x2days, 1tab(10mg)x2days. #42  Qty: 42 Tablet, Refills: 0  Start date: 2/12/2023               DISCONTINUED MEDICATIONS:  Current Discharge Medication List          I am the Primary Clinician of Record. Marylen Lazier, MD (electronically signed)    (Please note that parts of this dictation were completed with voice recognition software. Quite often unanticipated grammatical, syntax, homophones, and other interpretive errors are inadvertently transcribed by the computer software. Please disregards these errors.  Please excuse any errors that have escaped final proofreading.)

## 2023-03-08 ENCOUNTER — HOSPITAL ENCOUNTER (OUTPATIENT)
Dept: PHYSICAL THERAPY | Age: 38
Discharge: HOME | End: 2023-03-08
Payer: OTHER MISCELLANEOUS

## 2023-03-08 PROCEDURE — 97140 MANUAL THERAPY 1/> REGIONS: CPT

## 2023-03-08 PROCEDURE — 97162 PT EVAL MOD COMPLEX 30 MIN: CPT

## 2023-03-08 PROCEDURE — 97530 THERAPEUTIC ACTIVITIES: CPT

## 2023-03-08 PROCEDURE — 97110 THERAPEUTIC EXERCISES: CPT

## 2023-03-08 NOTE — THERAPY EVALUATION
[address] Physical Therapy  [address] [facility] ([facility]), [address] [place] [facility]  Phone: [phone] Fax: [phone]    Plan of Care/Statement of Necessity for Physical Therapy Services  2-15    Patient name: Joaquina Mata  : 1985  Provider#: [id number]  Referral source: Earnest Corbett MD      Medical/Treatment Diagnosis: Neck pain [M54.2]  Left arm pain [M79.602]     Prior Hospitalization: see medical history     Comorbidities: Fibromyalgia, history of thyroid dysfunction, asthma, lupus, migraines, history of R occipital neuralgia  Prior Level of Function: Independent, active work  Medications: Verified on Patient Summary List    Start of Care: 23      Onset Date: 23       The Plan of Care and following information is based on the information from the initial evaluation. Assessment/ key information:     The patient is a 30year old female who presents to physical therapy services due to acute-onset L-sided neck/L UE/low back/L hip pain/numbness/tingling status post work injury on 23. The patient demonstrates signs and symptoms consistent with neurodynamic, mobility, movement coordination, and muscle power impairments, including (+) [place] cluster for cervical radiculopathy, significant tenderness to palpation along L scalenes/first rib/upper trapezius/levator scapula/pectoralis major/minor/subscapularis/biceps/triceps muscle bellies, decreased and painful multiplanar cervicothoracic AROM, decreased and painful multiplanar L shoulder AROM, decreased multiplanar L > R UE/periscapular strength, decreased L  strength (R = 54.5 lbs.; L = 40.5 lbs.), and aberrant movement patterns with turning head and multiplanar reaching. Patient noting significant decrease in familiar neck/L UE symptoms with multiplanar L shoulder AROM post-evaluation and treatment today.  She may be a good candidate to trial dry needling services + biomechanical/muscular re-education as prescribed to improve neuromodulation and dampen neurological response during prolonged ADLs/IADLs and work activities pending response to conservative management. The patient would benefit from continued skilled physical therapy services to improve symptom management and safety/endurance/independence with functional tasks such as ADLs/IADLs and work activities.                                                     Cervical AROM:                                                                       R                      L                        Flexion                                     50 (neck \"pulling\")                                             Extension                                38 (neck p!)                                           Side Bending                           22 (L-sided neck \"soreness\")              20 (R-sided neck \"soreness\")                   Rotation                                   50 (neck \"pulling\")                   60 (neck \"pulling\")                                                     Thoracic AROM:                                                                      R                      L                        Flexion                                     32                                              Extension                                23 (L posterior shoulder \"pulling\")                                            Side Bending                           20                    19 (L-sided low back p!)                       Rotation                                   50 (R-sided low back \"pulling\")                       55     A/PROM Shoulder:                Right                           Left              IR                                 Hand to T6/WNL                     Hand to T6 (patient noting \"bruisey knot\" along back of L arm)/WNL              ER                               Hand to T4/WNL                     Hand to T2 (L shoulder \"catching/popping\")/WNL     *Shoulder IR/ER PROM measured at 45 degrees of shoulder abduction*    Evaluation Complexity History HIGH Complexity :3+ comorbidities / personal factors will impact the outcome/ POC ; Examination HIGH Complexity : 4+ Standardized tests and measures addressing body structure, function, activity limitation and / or participation in recreation  ;Presentation MEDIUM Complexity : Evolving with changing characteristics  ; Clinical Decision Making MEDIUM Complexity : FOTO score of 26-74  Overall Complexity Rating: MEDIUM    Problem List: pain affecting function, decrease ROM, decrease strength, edema affecting function, decrease ADL/ functional abilitiies, decrease activity tolerance, decrease flexibility/ joint mobility, and decrease transfer abilities   Treatment Plan may include any combination of the following: Therapeutic exercise, Neuromuscular reeducation, Manual therapy, Therapeutic activity, Self care/home management, Electric stim unattended , Vasopneumatic device, Gait training, Ultrasound, Mechanical traction, Electric stim attended, Needle insertion w/o injection (1 or 2 muscles), and Needle insertion w/o injection (3+ muscles)  Patient / Family readiness to learn indicated by: asking questions, trying to perform skills, and interest  Persons(s) to be included in education: patient (P)  Barriers to Learning/Limitations: None  Patient Goal (s): Some pain relief. Extra mobility, and some endurance.   Patient Self Reported Health Status: good  Rehabilitation Potential: good    Short Term Goals: To be accomplished in 6-8 treatments: The patient will demonstrate understanding of and compliance with updated and progressive HEP toward improved participation in physical therapy plan of care. The patient will demonstrate bilateral thoracic rotation AROM >= 60 degrees toward improved mechanics with bending/lifting tasks and rolling while sleeping.               The patient will demonstrate (-) L median/radial nerve tension tests toward improved mechanics with overhead reaching and ADLs/IADLs. Long Term Goals: To be accomplished in 16-20 treatments: The patient will demonstrate multiplanar L UE/periscapular strength increased by >= 1/2 MMT grade toward improved endurance with functional activities such as ADLs/IADLs and work tasks. The patient will demonstrate L  strength >= 50 lbs. average over two trials toward improved functional endurance with ADLs/IADLs and work tasks. 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 The patient will score >= 63 on neck FOTO and >= 71 on shoulder FOTO to demonstrate significantly improved subjective report of function. Frequency / Duration: Patient to be seen 2 times per week for 16-20 treatments. Patient/ Caregiver education and instruction: self care, activity modification, and exercises    [x]  Plan of care has been reviewed with LATASHA Eagle PT, DPT 3/8/2023     ________________________________________________________________________    I certify that the above Therapy Services are being furnished while the patient is under my care. I agree with the treatment plan and certify that this therapy is necessary.     Physician's Signature:____________________  Date:____________Time: _________      Mian Jones MD

## 2023-03-08 NOTE — PROGRESS NOTES
PT INITIAL EVALUATION NOTE 2-15    Patient Name: Ada Velasco  Date:3/8/2023  : 1985  [x]  Patient  Verified  Payor: Nico Quinwood / Plan: [id number] / Product Type: Workers Comp /    In time: 7606  Out time: 1513  Total Treatment Time (min): 95  Visit #: 1     Treatment Area: Neck pain [M54.2]  Left arm pain [M79.602]    SUBJECTIVE  Pain Level (0-10 scale): 5 currently throughout neck/L shoulder/UE. The patient describes symptoms as \"a band of pressure\" in neck; \"pulling/burning sensation\" in L shoulder; and \"pulling\" along L inner arm to elbow and into pinkie finger. 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 She notes she was able to [facility] this out\", but by that night was inflamed and cramping from her neck into her L shoulder and arm. She notes she called her supervisor the following morning, and was referred to ED, where she underwent full neurological work up which came back clear. She was prescribed steroid dose pack, which eased symptoms, however symptoms returned and have not abated since tapering off dose pack. She notes she additionally does experience headaches which tend to localize to base of skull and/or around the sides/top of her head, however flexeril helps these. She has been taking flexeril at night to allow improved sleep and not impact her as much at work.  She notes that recently, symptoms to include neck/L arm pain/cramping feel better in the mornings and then increase into low back and L hip by the end of her work shifts. She does note more recent and intermittent onset of numbness/tingling/coldness into L thumb and pinkie regions. She has been using Tylenol (suppository) and heat to alleviate symptoms throughout day and at night. She does endorse several episodes of dizziness since onset of current condition; she notes that once earlier this week she sharita from her recliner, took several steps, and then collapsed forward due to onset of dizziness, however blood pressure had dropped somewhat and recovered rapidly. She is currently communicating and delegating, however not performing clinical work. She is to be scheduled for neck/L shoulder MRI. Current functional limitations include: donning bra; washing hair; starting IVs at work; keeping arms up in air for prolonged periods; sleeping on L side and rolling; checking L blind spot when driving; and holding weight with L arm. Goals: \"Some pain relief. Extra mobility, and some endurance. \"    OBJECTIVE/EXAMINATION    Posture: Forwrad head posture, bilateral shoulder internal rotation, bilateral scapular winging, increased bilateral accessory muscle recruitment   Other Observations: Shoulder AROM: Grossly WNL bilaterally with shoulder flexion/abduction AROM; patient noting onset of \"a band of pain\" around her L shoulder with end-range abduction > flexion AROM  Gait and Functional Mobility: The patient ambulates with increased yaya, decreased bilateral arm swing, stiff gait pattern, increased bilateral accessory muscle recruitment, bilateral knee varus, and narrow base of support  Palpation: Tender to palpation at L triceps/biceps/pectoralis major/minor/subscapularis/infraspinatus/teres minor. Noted increased muscle turgor at L > R cervical paraspinals.   Vitals: /83   HR 81 seated at outset of session today        Cervical AROM:        R  L    Flexion    50 (neck \"pulling\")      Extension   38 (neck p!)      Side Bending   22 (L-sided neck \"soreness\")  20 (R-sided neck \"soreness\")    Rotation   50 (neck \"pulling\")  60 (neck \"pulling\")        Thoracic AROM:        R  L    Flexion    32      Extension   23 (L posterior shoulder \"pulling\")      Side Bending   20  19 (L-sided low back p!)    Rotation   50 (R-sided low back \"pulling\")  55    A/PROM Shoulder:   Right   Left   IR   Hand to T6/WNL  Hand to T6 (patient noting \"bruisey knot\" along back of L arm)/WNL   ER   Hand to T4/WNL             Hand to T2 (L shoulder \"catching/popping\")/WNL    *Shoulder IR/ER PROM measured at 45 degrees of shoulder abduction*    Joint Mobility Assessment: Cervical: Noted mild hypomobility to PA/bilateral lateral/rotational joint mobilizations (C2-7)                                                  Thoracic: WNL to mid-thoracic PA joint mobilizations (T4-10)     Flexibility: Noted grossly significantly decreased L > R upper trapezius/pectoralis major/minor/latissimus dorsi/subscapularis muscle length as assessed in standing/seated/mat table positions    UPPER QUARTER     MUSCLE STRENGTH  KEY        R  L  0 - No Contraction   Flexion   4+  4+  1 - Trace    Extension (elbow) 4+  4  2 - Poor    Abduction  4  4 (L upper arm p!)  3 - Fair     IR   4+  4+  4 - Good    ER   4  4- (neck p!)  5 - Normal    Periscapular MMT:                          R                              L  Middle Trapezius                              4+                             4+  Lower Trapezius                       4                               4-     Strength: R = 50 lbs. ; 59 lbs. Average = 54.5 lbs. L = 40 lbs. ; 41 lbs. Average = 40.5 lbs.         Special Tests: Yergason: (+) on L > R                           Cervical Compression: (+) for L forearm \"soreness\"                  Cervical Distraction: (+) for centralization of symptoms to neck                         ULNTT: Overtly (+) with distal sensitizer (cervical side bending) for L median/radial nerves; MSK response for L ulnar nerve     15 min Therapeutic Exercise:  [x] See flow sheet :   Rationale: increase ROM, increase strength, improve coordination, and increase proprioception to improve the patients ability to perform ADLs/IADLs and complete work tasks    15 min Therapeutic Activity:  []  See flow sheet : Includes time spent on patient education regarding role of physical therapy interventions to include trigger point versus functional dry needling in improving healing and recovery response and prognosis, as well as time spent reviewing/signing informed consent forms. Patient verbalizing and demonstrating understanding of provided education with 100% accuracy using teach back method.     Rationale: increase ROM, increase strength, improve coordination, and increase proprioception  to improve the patients ability to perform ADLs/IADLs and complete work tasks    15 min Manual Therapy: Bilateral cervical PA/lateral/rotational joint mobilizations (Grade II-III, C2-7); prone mid-thoracic joint mobilizations (T4-10, (-) cavitation, Grade III-IV); L shoulder PROM, all planes to tolerance; L glenohumeral inferior/AP joint mobilizations (Grade III-IV); STM/TPR bilateral cervical paraspinals/L upper trapezius/levator scapula/scalenes/pectoralis major/minor/supraspinatus/infraspinatus/teres minor/subscapularis/biceps/triceps muscle bellies; supine L first rib inferior joint mobilizations (Grade III-IV); L subscapularis MWM (pin + stretch with L shoulder ER PROM); L pectoralis major MWM (pin + stretch with L shoulder horizontal abduction at 120 degrees of elevation); manual L UE median/radial nerve glides (20x each position)   Rationale: decrease pain, increase ROM, increase tissue extensibility, decrease trigger points, and increase postural awareness  to improve the patients ability to perform ADLs/IADLs and complete work tasks          With   [x] TE   [x] TA   [] Neuro   [] SC   [] other: Patient Education: [x] Review HEP    [] Progressed/Changed HEP based on:   [] positioning   [] body mechanics   [] transfers   [] heat/ice application    [] other:      Other Objective/Functional Measures: FOTO Functional Measure: 38/100 (neck); 49/100 (shoulder)                Pain Level (0-10 scale) post treatment: 3 \"burning and soreness, like fatigue\" in L UE with multiplanar L shoulder AROM post-evaluation and treatment today    ASSESSMENT:      [x]  See Plan of [address] Weber, PT, DPT 3/8/2023

## 2023-03-09 ENCOUNTER — TRANSCRIBE ORDER (OUTPATIENT)
Dept: SCHEDULING | Age: 38
End: 2023-03-09

## 2023-03-09 DIAGNOSIS — S46.012D STRAIN OF TENDON OF LEFT ROTATOR CUFF, SUBSEQUENT ENCOUNTER: ICD-10-CM

## 2023-03-09 DIAGNOSIS — S13.4XXD SPRAIN OF LIGAMENTS OF CERVICAL SPINE, SUBSEQUENT ENCOUNTER: Primary | ICD-10-CM

## 2023-03-13 ENCOUNTER — HOSPITAL ENCOUNTER (OUTPATIENT)
Dept: PHYSICAL THERAPY | Age: 38
Discharge: HOME | End: 2023-03-13
Payer: OTHER MISCELLANEOUS

## 2023-03-13 PROCEDURE — 97530 THERAPEUTIC ACTIVITIES: CPT

## 2023-03-13 PROCEDURE — 97014 ELECTRIC STIMULATION THERAPY: CPT

## 2023-03-13 PROCEDURE — 20561 NDL INSJ W/O NJX 3+ MUSC: CPT

## 2023-03-13 PROCEDURE — 97110 THERAPEUTIC EXERCISES: CPT

## 2023-03-13 PROCEDURE — 97140 MANUAL THERAPY 1/> REGIONS: CPT

## 2023-03-13 NOTE — PROGRESS NOTES
PT DAILY TREATMENT NOTE 2-15    Patient Name: Aparna Sauceda  OSAK:  : 1985  [x]  Patient  Verified  Payor: Tashia Ly / Plan: [id number] [facility], [place] / Product Type: Workers Comp /    In time: 1300  Out time: 1434  Total Treatment Time (min): 80  Visit #: 2    *Patient's mother Starboogiee Due) present throughout first half of therapy visit throughout this date*    Treatment Area: Neck pain [M54.2]  Left arm pain [M79.602]    SUBJECTIVE  Pain Level (0-10 scale): 4 in L > R-sided neck/shoulder/UE  Any medication changes, allergies to medications, adverse drug reactions, diagnosis change, or new procedure performed?: [x] No    [] Yes (see summary sheet for update)  Subjective functional status/changes:   [] No changes reported    The patient reports she felt good after last visit, then had to take Aleve due to soreness to sleep that night; she returned to work Saturday, felt a \"pop\" in neck that day, then notes return of \"tightness\" in neck and both arms. She notes she felt increased muscle spasms in her low back last night, which she managed with heat. She additionally reports difficulty swallowing her normal medications since injury.     OBJECTIVE    32 min Therapeutic Exercise:  [x] See flow sheet :   Rationale: increase ROM, increase strength, improve coordination, and increase proprioception to improve the patients ability to perform ADLs/IADLs and complete work tasks    20 min Therapeutic Activity:  []  See flow sheet : Includes time spent reviewing anatomy/pathophysiology of functional dry needling to include potential systemic effects, and on time spent monitoring patient post-dry needling intervention secondary to onset of dizziness and nausea   Rationale: increase ROM, increase strength, improve coordination, improve balance, and increase proprioception  to improve the patients ability to perform ADLs/IADLs and complete work tasks     12 min Manual Therapy: Bilateral cervical PA/lateral/rotational joint mobilizations (Grade II-III, C2-7); prone mid-thoracic joint mobilizations (T4-10, (-) cavitation, Grade III-IV); L shoulder PROM, all planes to tolerance; L glenohumeral inferior/AP joint mobilizations (Grade III-IV); STM/TPR bilateral cervical paraspinals/L upper trapezius/levator scapula/scalenes/pectoralis major/minor/supraspinatus/infraspinatus/teres minor/subscapularis/biceps/triceps muscle bellies; supine L first rib inferior joint mobilizations (Grade III-IV); L subscapularis MWM (pin + stretch with L shoulder ER PROM); L pectoralis major MWM (pin + stretch with L shoulder horizontal abduction at 120 degrees of elevation); manual L UE median/radial nerve glides (20x each position)   Rationale: decrease pain, increase ROM, increase tissue extensibility, decrease edema , decrease trigger points, and increase postural awareness  to improve the patients ability to perform ADLs/IADLs and complete work tasks    NT   min Dry Needling without E-Stim         (not to be included in total timed codes or 1:1 Treatment Time)   NT  min Dry Needling with E-Stim: Attended      Type:       Muscles:    30 min Dry Needling with E-Stim: Unattended      Type: Direct current (10 Hz, intensity to tolerance)      Muscles: See below     Billing:  []  15252 (1-2 muscles)         [x]  35005 (3+ muscles)         [x]  Script obtained from MD specifying \"Dry Needling\"    Written Informed Consent Obtained and Document Included in Chart: YES    Procedure: Intramuscular Dry Needling was done with a 30-50 gauge solid monofilament needle,                       under aseptic technique      Rationale/Necessity: Decrease pain, Increase ROM, Increase/Improve Function, Increase Tissue Extensibility, Reduce Trigger Points, Reduce Spasm, Restore Muscle Balance, and Reduce Headaches    Muscles Treated:  [x]  Bilateral: Cervical paraspinals (C7)                                 []  Right: [x]  Left: Supraspinatus/infraspinatus/teres major/triceps; upper trapezius/pectoralis major/latissimus dorsi (trigger point)                           [x]  Hemostasis applied after each needle application     Response: Reduce Pain (post treatment pain level: Decreased/10), Localized Twitch Response, Increased ROM, Increased Tolerance to Exercise/Activity, Increased Motor Recruitment, Improve Soft Tissue Mobility, and Reduced Tightness    Education: Purpose or rationale of dry needling                      Side effects and possible adverse effects of the treatment                      The need to report the use of blood thinners and/or immunosupressant medications                      How to respond to possible adverse effects of the treatment                       Self-treatment of post needling soreness (ice/heat, stretching, activity modification)                      Opportunity was given to ask questions; all questions were answered           With   [x] TE   [x] TA   [] Neuro   [] SC   [] other: Patient Education: [x] Review HEP    [] Progressed/Changed HEP based on:   [] positioning   [] body mechanics   [] transfers   [] heat/ice application    [] other:      Other Objective/Functional Measures: None noted     Pain Level (0-10 scale) post treatment: Patient noting abolishment of familiar \"burning/sharp\" pain, increased exercise-induced fatigue and soreness seated at mat table post-session today    ASSESSMENT/Changes in Function:   The patient tolerated therapy visit well at this date. Initiated trigger point/functional dry needling treatment to improve neuromuscular modulation of symptoms; patient tolerated moderately well with no noted redness/bruising/bleeding, however reporting increased localized neck \"aching\" and onset of mild-moderate dizziness/nausea post-dry needling treatment which resolved with emphasis on quiet rest and hydration.  Patient noting centralization of symptoms to neck with test-retest post-dry needling intervention. Educated to avoid use of ice/anti-inflammatories for 24-48 hours to allow healing response status post functional dry needling intervention. Patient noting significant global fatigue, reduction in familiar symptoms post-session today. Continue to progress as tolerated. Patient will continue to benefit from skilled PT services to modify and progress therapeutic interventions, address functional mobility deficits, address ROM deficits, address strength deficits, analyze and address soft tissue restrictions, analyze and cue movement patterns, analyze and modify body mechanics/ergonomics, assess and modify postural abnormalities, address imbalance/dizziness, and instruct in home and community integration to attain remaining goals. [x]  See Plan of Care  []  See progress note/recertification  []  See Discharge Summary         Progress towards goals / Updated goals:    Short Term Goals: To be accomplished in 6-8 treatments: The patient will demonstrate understanding of and compliance with updated and progressive HEP toward improved participation in physical therapy plan of care. - Progressing              The patient will demonstrate bilateral thoracic rotation AROM >= 60 degrees toward improved mechanics with bending/lifting tasks and rolling while sleeping. - Progressing              The patient will demonstrate (-) L median/radial nerve tension tests toward improved mechanics with overhead reaching and ADLs/IADLs. - Progressing  Long Term Goals: To be accomplished in 16-20 treatments: The patient will demonstrate multiplanar L UE/periscapular strength increased by >= 1/2 MMT grade toward improved endurance with functional activities such as ADLs/IADLs and work tasks. - Progressing              The patient will demonstrate L  strength >= 50 lbs. average over two trials toward improved functional endurance with ADLs/IADLs and work tasks.  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 - Progressing              The patient will score >= 63 on neck FOTO and >= 71 on shoulder FOTO to demonstrate significantly improved subjective report of function. - Progressing  Frequency / Duration: Patient to be seen 2 times per week for 16-20 treatments.     PLAN  [x]  Upgrade activities as tolerated     [x]  Continue plan of care  [x]  Update interventions per flow sheet       []  Discharge due to:_  []  Other:_      Susy Pryor, PT, DPT 3/13/2023

## 2023-03-17 ENCOUNTER — HOSPITAL ENCOUNTER (OUTPATIENT)
Dept: PHYSICAL THERAPY | Age: 38
Discharge: HOME | End: 2023-03-17
Payer: OTHER MISCELLANEOUS

## 2023-03-17 PROCEDURE — 97140 MANUAL THERAPY 1/> REGIONS: CPT

## 2023-03-17 PROCEDURE — 97110 THERAPEUTIC EXERCISES: CPT

## 2023-03-17 PROCEDURE — 97530 THERAPEUTIC ACTIVITIES: CPT

## 2023-03-17 NOTE — PROGRESS NOTES
PT DAILY TREATMENT NOTE 2-15    Patient Name: Vilma Acuna  AZZM:  : 1985  [x]  Patient  Verified  Payor: Mickey Courser / Plan: [address] / Product Type: Workers Comp /    In time: 1100  Out time: 1220  Total Treatment Time (min): 80  Visit #: 3    *Patient's mother Jojo Rose) present throughout therapy visit this date*    Treatment Area: Neck pain [M54.2]  Left arm pain [M79.602]    SUBJECTIVE  Pain Level (0-10 scale): 5 in L > R-sided neck/shoulder/UE  Any medication changes, allergies to medications, adverse drug reactions, diagnosis change, or new procedure performed?: [x] No    [] Yes (see summary sheet for update)  Subjective functional status/changes:   [] No changes reported    The patient reports she was in \"excruciating pain\" which caused her to cry related to neck pain the evening after last visit. She notes she held off on Aleve, then felt somewhat better in her L shoulder/arm and was able to swallow her medications without issue Tuesday evening. She reports her radiating pain into her L arm has been nearly gone, and she continues to experience increased neck pain and soreness. She worked two straight days Wednesday and Thursday, which increased her anxiety and pain, and took Aleve again last night. She does note her neck \"locks up\" when rotating after periods of time when fatigued at work; she feels like she has a \"band around my head, and an ice pick in the top of my head\" which causes headaches. She did have some popping her her L jaw after last visit. She notes she has been using exercises to manage symptoms at work, which has helped.     OBJECTIVE    40 min Therapeutic Exercise:  [x] See flow sheet :   Rationale: increase ROM, increase strength, improve coordination, and increase proprioception to improve the patients ability to perform ADLs/IADLs and complete work tasks    20 min Therapeutic Activity:  []  See flow sheet : Includes time spent reviewing recent events, anatomy/pathophysiology of functional dry needling to include potential systemic effects, and on pain neuroscience education and graded activity principles to tolerance. Patient and her mother verbalizing and demonstrating understanding of provided education with 100% accuracy using teach back method.    Rationale: increase ROM, increase strength, improve coordination, improve balance, and increase proprioception  to improve the patients ability to perform ADLs/IADLs and complete work tasks     20 min Manual Therapy: Bilateral cervical PA/lateral/rotational joint mobilizations (Grade II-III, C2-7); prone mid-thoracic joint mobilizations (T4-10, (-) cavitation, Grade III-IV); L shoulder PROM, all planes to tolerance; L glenohumeral inferior/AP joint mobilizations (Grade III-IV); STM/TPR bilateral cervical paraspinals/L upper trapezius/levator scapula/scalenes/pectoralis major/minor/supraspinatus/infraspinatus/teres minor/subscapularis/biceps/triceps muscle bellies; supine L first rib inferior joint mobilizations (Grade III-IV); L subscapularis MWM (pin + stretch with L shoulder ER PROM); L pectoralis major MWM (pin + stretch with L shoulder horizontal abduction at 120 degrees of elevation); manual L UE median/radial nerve glides (20x each position)   Rationale: decrease pain, increase ROM, increase tissue extensibility, decrease edema , decrease trigger points, and increase postural awareness  to improve the patients ability to perform ADLs/IADLs and complete work tasks    NT   min Dry Needling without E-Stim         (not to be included in total timed codes or 1:1 Treatment Time)   NT  min Dry Needling with E-Stim: Attended      Type:       Muscles:    NT min Dry Needling with E-Stim: Unattended      Type: Direct current (10 Hz, intensity to tolerance)      Muscles: See below     Billing:  []  [id number] (1-2 muscles)         [x]  04831 (3+ muscles)         [x]  Script obtained from MD specifying \"Dry Needling\"    Written Informed Consent Obtained and Document Included in Chart: YES    Procedure: Intramuscular Dry Needling was done with a 30-50 gauge solid monofilament needle,                       under aseptic technique      Rationale/Necessity: Decrease pain, Increase ROM, Increase/Improve Function, Increase Tissue Extensibility, Reduce Trigger Points, Reduce Spasm, Restore Muscle Balance, and Reduce Headaches    Muscles Treated:  [x]  Bilateral: Cervical paraspinals (C7)                                 []  Right:                                  [x]  Left: Supraspinatus/infraspinatus/teres major/triceps; upper trapezius/pectoralis major/latissimus dorsi (trigger point)                           [x]  Hemostasis applied after each needle application     Response: Reduce Pain (post treatment pain level: Decreased/10), Localized Twitch Response, Increased ROM, Increased Tolerance to Exercise/Activity, Increased Motor Recruitment, Improve Soft Tissue Mobility, and Reduced Tightness    Education: Purpose or rationale of dry needling                      Side effects and possible adverse effects of the treatment                      The need to report the use of blood thinners and/or immunosupressant medications                      How to respond to possible adverse effects of the treatment                       Self-treatment of post needling soreness (ice/heat, stretching, activity modification)                      Opportunity was given to ask questions; all questions were answered           With   [x] TE   [x] TA   [] Neuro   [] SC   [] other: Patient Education: [x] Review HEP    [] Progressed/Changed HEP based on:   [] positioning   [] body mechanics   [] transfers   [] heat/ice application    [] other:      Other Objective/Functional Measures: None noted     Pain Level (0-10 scale) post treatment: Patient noting abolishment of familiar \"burning/sharp\" pain, demonstrating improved neck discomfort and bilateral rotation AROM, reporting increased exercise-induced fatigue and soreness to 1-2 on VAS seated at mat table post-session today    ASSESSMENT/Changes in Function:   The patient tolerated therapy visit well at this date. Emphasis on continued pain neuroscience education to improve activity tolerance and symptom response in home/work environment. Deferred trigger point/functional dry needling treatment today secondary to patient request. Patient continues to respond very well to gentle manual intervention; noted abolishment of familiar headache with emphasis on cervical paraspinal/L shoulder/UE STM/TPR and joint mobilizations. Noted increased accessory muscle recruitment and cervical flexion during seated chin tucks; patient benefits from verbal and visual cues to recruit deep cervical stabilizers. Patient continues to fatigue rapidly with strengthening/stabilization intervention. Patient noting significant global fatigue, significant reduction in familiar symptoms post-session today. Continue to progress as tolerated. Patient will continue to benefit from skilled PT services to modify and progress therapeutic interventions, address functional mobility deficits, address ROM deficits, address strength deficits, analyze and address soft tissue restrictions, analyze and cue movement patterns, analyze and modify body mechanics/ergonomics, assess and modify postural abnormalities, address imbalance/dizziness, and instruct in home and community integration to attain remaining goals. [x]  See Plan of Care  []  See progress note/recertification  []  See Discharge Summary         Progress towards goals / Updated goals:    Short Term Goals: To be accomplished in 6-8 treatments: The patient will demonstrate understanding of and compliance with updated and progressive HEP toward improved participation in physical therapy plan of care.  - Progressing              The patient will demonstrate bilateral thoracic rotation AROM >= 60 degrees toward improved mechanics with bending/lifting tasks and rolling while sleeping. - Progressing              The patient will demonstrate (-) L median/radial nerve tension tests toward improved mechanics with overhead reaching and ADLs/IADLs. - Progressing  Long Term Goals: To be accomplished in 16-20 treatments: The patient will demonstrate multiplanar L UE/periscapular strength increased by >= 1/2 MMT grade toward improved endurance with functional activities such as ADLs/IADLs and work tasks. - Progressing              The patient will demonstrate L  strength >= 50 lbs. average over two trials toward improved functional endurance with ADLs/IADLs and work tasks. 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 - Progressing              The patient will score >= 63 on neck FOTO and >= 71 on shoulder FOTO to demonstrate significantly improved subjective report of function. - Progressing  Frequency / Duration: Patient to be seen 2 times per week for 16-20 treatments.     PLAN  [x]  Upgrade activities as tolerated     [x]  Continue plan of care  [x]  Update interventions per flow sheet       []  Discharge due to:_  []  Other:_      Domitila Philip, PT, DPT 3/17/2023

## 2023-03-20 ENCOUNTER — HOSPITAL ENCOUNTER (OUTPATIENT)
Dept: MRI IMAGING | Age: 38
Discharge: HOME | End: 2023-03-20
Attending: EMERGENCY MEDICINE

## 2023-03-20 ENCOUNTER — APPOINTMENT (OUTPATIENT)
Dept: PHYSICAL THERAPY | Age: 38
End: 2023-03-20
Payer: OTHER MISCELLANEOUS

## 2023-03-20 DIAGNOSIS — S13.4XXD SPRAIN OF LIGAMENTS OF CERVICAL SPINE, SUBSEQUENT ENCOUNTER: ICD-10-CM

## 2023-03-20 DIAGNOSIS — S46.012D STRAIN OF TENDON OF LEFT ROTATOR CUFF, SUBSEQUENT ENCOUNTER: ICD-10-CM

## 2023-03-20 PROCEDURE — 73221 MRI JOINT UPR EXTREM W/O DYE: CPT

## 2023-03-20 PROCEDURE — 72141 MRI NECK SPINE W/O DYE: CPT

## 2023-03-23 ENCOUNTER — HOSPITAL ENCOUNTER (OUTPATIENT)
Dept: PHYSICAL THERAPY | Age: 38
Discharge: HOME | End: 2023-03-23
Payer: OTHER MISCELLANEOUS

## 2023-03-23 PROCEDURE — 97110 THERAPEUTIC EXERCISES: CPT

## 2023-03-23 PROCEDURE — 97530 THERAPEUTIC ACTIVITIES: CPT

## 2023-03-23 PROCEDURE — 97014 ELECTRIC STIMULATION THERAPY: CPT

## 2023-03-23 PROCEDURE — 97140 MANUAL THERAPY 1/> REGIONS: CPT

## 2023-03-23 PROCEDURE — 20561 NDL INSJ W/O NJX 3+ MUSC: CPT

## 2023-03-23 NOTE — PROGRESS NOTES
PT DAILY TREATMENT NOTE 2-15    Patient Name: Alexandru Cook  Date:3/23/2023  : 1985  [x]  Patient  Verified  Payor: Nick Horner / Plan: [id number] / Product Type: Workers Comp /    In time: 1500  Out time: 1628  Total Treatment Time (min): 80  Visit #: 4    Treatment Area: Neck pain [M54.2]  Left arm pain [M79.602]    SUBJECTIVE  Pain Level (0-10 scale): 3-4 in L > R-sided neck/shoulder/UE  Any medication changes, allergies to medications, adverse drug reactions, diagnosis change, or new procedure performed?: [x] No    [] Yes (see summary sheet for update)  Subjective functional status/changes:   [] No changes reported    The patient reports she felt good after last visit; she took flexeril subsequent to this which helped her to manage, and she feels she has globally increased inflammatory response at the moment. She followed up with referring physician this past Tuesday, who continued light duty for three more weeks; patient is scheduled to return to full duty on 23 at this time. She notes she has been pushing herself more, and pushed shopping cart for 20 minutes until it got too heavy the other day; additionally, she has been reaching overhead more with light weight at home. She worked 8h-12h-5h the past three days; she notes increased \"burning\" in both upper arms and rotator cuffs. Recent MRIs came back \"normal\". She notes home exercises continue to challenge her well, and alleviate her symptoms. She reports dizziness is still present but improving. Exercises continue to challenge her well; she continues to have difficulty pushing/pulling workstations on wheels at work.     OBJECTIVE    25 min Therapeutic Exercise:  [x] See flow sheet :   Rationale: increase ROM, increase strength, improve coordination, and increase proprioception to improve the patients ability to perform ADLs/IADLs and complete work tasks    18 min Therapeutic Activity:  []  See flow sheet : Includes time spent reviewing recent events, anatomy/pathophysiology of functional dry needling to include potential systemic effects, and on pain neuroscience education and graded activity principles to tolerance. Patient verbalizing and demonstrating understanding of provided education with 100% accuracy using teach back method.    Rationale: increase ROM, increase strength, improve coordination, improve balance, and increase proprioception  to improve the patients ability to perform ADLs/IADLs and complete work tasks     15 min Manual Therapy: Bilateral cervical PA/lateral/rotational joint mobilizations (Grade II-III, C2-7); prone mid-thoracic joint mobilizations (T4-10, (-) cavitation, Grade III-IV); L shoulder PROM, all planes to tolerance; L glenohumeral inferior/AP joint mobilizations (Grade III-IV); STM/TPR bilateral cervical paraspinals/L upper trapezius/levator scapula/scalenes/pectoralis major/minor/supraspinatus/infraspinatus/teres minor/subscapularis/biceps/triceps muscle bellies; supine L first rib inferior joint mobilizations (Grade III-IV); L subscapularis MWM (pin + stretch with L shoulder ER PROM); L pectoralis major MWM (pin + stretch with L shoulder horizontal abduction at 120 degrees of elevation); manual L UE median/radial nerve glides (20x each position)   Rationale: decrease pain, increase ROM, increase tissue extensibility, decrease edema , decrease trigger points, and increase postural awareness  to improve the patients ability to perform ADLs/IADLs and complete work tasks    NT   min Dry Needling without E-Stim         (not to be included in total timed codes or 1:1 Treatment Time)   NT  min Dry Needling with E-Stim: Attended      Type:       Muscles:    30 min Dry Needling with E-Stim: Unattended      Type: Direct current (10 Hz, intensity to tolerance)      Muscles: See below     Billing:  []  [id number] (1-2 muscles)         [x]  [id number] (3+ muscles)         [x]  Script obtained from MD specifying \"Dry Needling\"    Written Informed Consent Obtained and Document Included in Chart: YES    Procedure: Intramuscular Dry Needling was done with a 30-50 gauge solid monofilament needle,                       under aseptic technique      Rationale/Necessity: Decrease pain, Increase ROM, Increase/Improve Function, Increase Tissue Extensibility, Reduce Trigger Points, Reduce Spasm, Restore Muscle Balance, and Reduce Headaches    Muscles Treated:  []  Bilateral: NT                                 []  Right: NT                                 [x]  Left: Supraspinatus/infraspinatus/teres major/triceps; upper trapezius/pectoralis major/latissimus dorsi (trigger point)                           [x]  Hemostasis applied after each needle application     Response: Reduce Pain (post treatment pain level: 2/10), Localized Twitch Response, Increased ROM, Increased Tolerance to Exercise/Activity, Increased Motor Recruitment, Improve Soft Tissue Mobility, and Reduced Tightness    Education: Purpose or rationale of dry needling                      Side effects and possible adverse effects of the treatment                      The need to report the use of blood thinners and/or immunosupressant medications                      How to respond to possible adverse effects of the treatment                       Self-treatment of post needling soreness (ice/heat, stretching, activity modification)                      Opportunity was given to ask questions; all questions were answered           With   [x] TE   [x] TA   [] Neuro   [] SC   [] other: Patient Education: [x] Review HEP    [] Progressed/Changed HEP based on:   [] positioning   [] body mechanics   [] transfers   [] heat/ice application    [] other:      Other Objective/Functional Measures: 3 in neck/L shoulder/UE; patient notes \"it feels different\" post-manual intervention     Pain Level (0-10 scale) post treatment: 2 at base of neck with multiplanar cervical AROM seated at edge of mat table post-session today    ASSESSMENT/Changes in Function:   The patient tolerated therapy visit well at this date. Noted decreased L shoulder IR AROM with prone lift off holds, however patient able to increase IR AROM tolerably with repetition. Continued trigger point/functional dry needling treatment to improve neuromuscular modulation of symptoms; patient tolerated well with no noted redness/bruising/bleeding, however reporting mildly increased localized neck \"aching\" and no onset of dizziness/nausea post-dry needling treatment with emphasis on L shoulder needling. Patient noting centralization of symptoms to neck with test-retest post-dry needling intervention. Educated to avoid use of ice/anti-inflammatories for 24-48 hours to allow healing response status post functional dry needling intervention. Patient noting significant global fatigue, reduction in familiar symptoms post-session today. Continue to progress as tolerated. Patient will continue to benefit from skilled PT services to modify and progress therapeutic interventions, address functional mobility deficits, address ROM deficits, address strength deficits, analyze and address soft tissue restrictions, analyze and cue movement patterns, analyze and modify body mechanics/ergonomics, assess and modify postural abnormalities, address imbalance/dizziness, and instruct in home and community integration to attain remaining goals. [x]  See Plan of Care  []  See progress note/recertification  []  See Discharge Summary         Progress towards goals / Updated goals:    Short Term Goals: To be accomplished in 6-8 treatments: The patient will demonstrate understanding of and compliance with updated and progressive HEP toward improved participation in physical therapy plan of care.  - Progressing              The patient will demonstrate bilateral thoracic rotation AROM >= 60 degrees toward improved mechanics with bending/lifting tasks and rolling while sleeping. - Progressing              The patient will demonstrate (-) L median/radial nerve tension tests toward improved mechanics with overhead reaching and ADLs/IADLs. - Progressing  Long Term Goals: To be accomplished in 16-20 treatments: The patient will demonstrate multiplanar L UE/periscapular strength increased by >= 1/2 MMT grade toward improved endurance with functional activities such as ADLs/IADLs and work tasks. - Progressing              The patient will demonstrate L  strength >= 50 lbs. average over two trials toward improved functional endurance with ADLs/IADLs and work tasks. 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 - Progressing              The patient will score >= 63 on neck FOTO and >= 71 on shoulder FOTO to demonstrate significantly improved subjective report of function. - Progressing  Frequency / Duration: Patient to be seen 2 times per week for 16-20 treatments.     PLAN  [x]  Upgrade activities as tolerated     [x]  Continue plan of care  [x]  Update interventions per flow sheet       []  Discharge due to:_  []  Other:_      Magali Dinh, PT, DPT 3/23/2023

## 2023-03-27 ENCOUNTER — HOSPITAL ENCOUNTER (OUTPATIENT)
Dept: PHYSICAL THERAPY | Age: 38
Discharge: HOME | End: 2023-03-27
Payer: OTHER MISCELLANEOUS

## 2023-03-27 PROCEDURE — 97140 MANUAL THERAPY 1/> REGIONS: CPT

## 2023-03-27 PROCEDURE — 20561 NDL INSJ W/O NJX 3+ MUSC: CPT

## 2023-03-27 PROCEDURE — 97530 THERAPEUTIC ACTIVITIES: CPT

## 2023-03-27 PROCEDURE — 97014 ELECTRIC STIMULATION THERAPY: CPT

## 2023-03-27 PROCEDURE — 97110 THERAPEUTIC EXERCISES: CPT

## 2023-03-27 NOTE — PROGRESS NOTES
PT DAILY TREATMENT NOTE 2-15    Patient Name: Amy Yu  Date:3/27/2023  : 1985  [x]  Patient  Verified  Payor: Jakob Pichardo / Plan: [id number] / Product Type: Workers Comp /    In time: 934  Out time: 110  Total Treatment Time (min): 90  Visit #: 5    Treatment Area: Neck pain [M54.2]  Left arm pain [M79.602]    SUBJECTIVE  Pain Level (0-10 scale): 3 in L > R-sided neck/shoulder/UE  Any medication changes, allergies to medications, adverse drug reactions, diagnosis change, or new procedure performed?: [x] No    [] Yes (see summary sheet for update)  Subjective functional status/changes:   [] No changes reported    The patient reports she felt \"much better\" after last visit; she took 2.5mg flexeril and slept well that night after last visit, however did not have significant pain response and in fact felt better. She was able to lift 5# box while doing housework this weekend and was somewhat sore in her muscles, however used her chin tuck and shoulder blade pinch to position herself well, which helped. She was also able to carry light trash bags from her house without issue. She continues to be anxious about pushing/pulling/bending/lifting she will be required to perform when returning to full duty at work. She notes exercise involving punching on her back has gotten easier at home, and nerve glides tend to help symptoms. She has noticed more ease with donning/doffing bra since initiating lift off exercise last visit.     OBJECTIVE    20 min Therapeutic Exercise:  [x] See flow sheet :   Rationale: increase ROM, increase strength, improve coordination, and increase proprioception to improve the patients ability to perform ADLs/IADLs and complete work tasks    25 min Therapeutic Activity:  []  See flow sheet : Includes time spent reviewing recent events, anatomy/pathophysiology of functional dry needling to include potential systemic effects, and on pain neuroscience education and graded activity principles to tolerance. Patient verbalizing and demonstrating understanding of provided education with 100% accuracy using teach back method.    Rationale: increase ROM, increase strength, improve coordination, improve balance, and increase proprioception  to improve the patients ability to perform ADLs/IADLs and complete work tasks     15 min Manual Therapy: Bilateral cervical PA/lateral/rotational joint mobilizations (Grade II-III, C2-7); prone mid-thoracic joint mobilizations (T4-10, (-) cavitation, Grade III-IV); L shoulder PROM, all planes to tolerance; L glenohumeral inferior/AP joint mobilizations (Grade III-IV); STM/TPR bilateral cervical paraspinals/L upper trapezius/levator scapula/scalenes/pectoralis major/minor/supraspinatus/infraspinatus/teres minor/subscapularis/biceps/triceps muscle bellies; supine L first rib inferior joint mobilizations (Grade III-IV); L subscapularis MWM (pin + stretch with L shoulder ER PROM); L pectoralis major MWM (pin + stretch with L shoulder horizontal abduction at 120 degrees of elevation); manual L UE median/radial nerve glides (20x each position)   Rationale: decrease pain, increase ROM, increase tissue extensibility, decrease edema , decrease trigger points, and increase postural awareness  to improve the patients ability to perform ADLs/IADLs and complete work tasks    NT   min Dry Needling without E-Stim         (not to be included in total timed codes or 1:1 Treatment Time)   NT  min Dry Needling with E-Stim: Attended      Type:       Muscles:    30 min Dry Needling with E-Stim: Unattended      Type: Direct current (10 Hz, intensity to tolerance)      Muscles: See below     Billing:  []  [id number] (1-2 muscles)         [x]  [id number] (3+ muscles)         [x]  Script obtained from MD specifying \"Dry Needling\"    Written Informed Consent Obtained and Document Included in Chart: YES    Procedure: Intramuscular Dry Needling was done with a 30-50 gauge solid monofilament needle,                       under aseptic technique      Rationale/Necessity: Decrease pain, Increase ROM, Increase/Improve Function, Increase Tissue Extensibility, Reduce Trigger Points, Reduce Spasm, Restore Muscle Balance, and Reduce Headaches    Muscles Treated:  []  Bilateral: NT                                 []  Right: NT                                 [x]  Left: Supraspinatus/infraspinatus/teres major/triceps; upper trapezius/pectoralis major/latissimus dorsi (trigger point)                           [x]  Hemostasis applied after each needle application     Response: Reduce Pain (post treatment pain level: 3/10), Localized Twitch Response, Increased ROM, Increased Tolerance to Exercise/Activity, Increased Motor Recruitment, Improve Soft Tissue Mobility, and Reduced Tightness    Education: Purpose or rationale of dry needling                      Side effects and possible adverse effects of the treatment                      The need to report the use of blood thinners and/or immunosupressant medications                      How to respond to possible adverse effects of the treatment                       Self-treatment of post needling soreness (ice/heat, stretching, activity modification)                      Opportunity was given to ask questions; all questions were answered           With   [x] TE   [x] TA   [] Neuro   [] SC   [] other: Patient Education: [x] Review HEP    [] Progressed/Changed HEP based on:   [] positioning   [] body mechanics   [] transfers   [] heat/ice application    [] other:      Other Objective/Functional Measures: 2 in neck; patient notes \"it feels different, more sore\" in upper neck post-manual intervention     Pain Level (0-10 scale) post treatment: 3 \"soreness\" into L posterior shoulder/L UE with multiplanar cervical AROM seated at edge of mat table post-session today    ASSESSMENT/Changes in Function:   The patient tolerated therapy visit well at this date. Patient demonstrates decreased muscular stability and rapid fatigue with serratus wall slides and supine serratus punch loading progression. Noted decreased cervical paraspinal muscle turgor versus previously during manual intervention. Continued trigger point/functional dry needling treatment to improve neuromuscular modulation of symptoms; patient tolerated well with minimal noted redness/bruising/bleeding, however reporting mildly increased L posterior shoulder/UE \"soreness\" and no onset of dizziness/nausea post-dry needling treatment with emphasis on L shoulder needling. Educated to avoid use of ice/anti-inflammatories for 24-48 hours to allow healing response status post functional dry needling intervention. Patient noting significant global fatigue, change in familiar symptoms post-session today. Continue to progress as tolerated. Patient will continue to benefit from skilled PT services to modify and progress therapeutic interventions, address functional mobility deficits, address ROM deficits, address strength deficits, analyze and address soft tissue restrictions, analyze and cue movement patterns, analyze and modify body mechanics/ergonomics, assess and modify postural abnormalities, address imbalance/dizziness, and instruct in home and community integration to attain remaining goals. [x]  See Plan of Care  []  See progress note/recertification  []  See Discharge Summary         Progress towards goals / Updated goals:    Short Term Goals: To be accomplished in 6-8 treatments: The patient will demonstrate understanding of and compliance with updated and progressive HEP toward improved participation in physical therapy plan of care.  - Progressing              The patient will demonstrate bilateral thoracic rotation AROM >= 60 degrees toward improved mechanics with bending/lifting tasks and rolling while sleeping. - Progressing              The patient will demonstrate (-) L median/radial nerve tension tests toward improved mechanics with overhead reaching and ADLs/IADLs. - Progressing  Long Term Goals: To be accomplished in 16-20 treatments: The patient will demonstrate multiplanar L UE/periscapular strength increased by >= 1/2 MMT grade toward improved endurance with functional activities such as ADLs/IADLs and work tasks. - Progressing              The patient will demonstrate L  strength >= 50 lbs. average over two trials toward improved functional endurance with ADLs/IADLs and work tasks. 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 - Progressing              The patient will score >= 63 on neck FOTO and >= 71 on shoulder FOTO to demonstrate significantly improved subjective report of function. - Progressing  Frequency / Duration: Patient to be seen 2 times per week for 16-20 treatments.     PLAN  [x]  Upgrade activities as tolerated     [x]  Continue plan of care  [x]  Update interventions per flow sheet       []  Discharge due to:_  []  Other:_      Rafal Johnson, PT, DPT 3/27/2023

## 2023-03-30 ENCOUNTER — HOSPITAL ENCOUNTER (OUTPATIENT)
Dept: PHYSICAL THERAPY | Age: 38
End: 2023-03-30
Payer: OTHER MISCELLANEOUS

## 2023-03-30 PROCEDURE — 97140 MANUAL THERAPY 1/> REGIONS: CPT

## 2023-03-30 PROCEDURE — 97530 THERAPEUTIC ACTIVITIES: CPT

## 2023-03-30 PROCEDURE — 97110 THERAPEUTIC EXERCISES: CPT

## 2023-03-30 NOTE — PROGRESS NOTES
PT DAILY TREATMENT NOTE 2-15    Patient Name: Joaquina Mata  Date:3/30/2023  : 1985  [x]  Patient  Verified  Payor: Darylene Porch / Plan: [id number] / Product Type: Workers Comp /    In time: 1505  Out time: 1608  Total Treatment Time (min): 61  Visit #: 10    *Patient's mother Brandt Bruce) present throughout first half of therapy visit throughout this date*    Treatment Area: Neck pain [M54.2]  Left arm pain [M79.602]    SUBJECTIVE  Pain Level (0-10 scale): 3 in L > R-sided neck/shoulder/UE  Any medication changes, allergies to medications, adverse drug reactions, diagnosis change, or new procedure performed?: [x] No    [] Yes (see summary sheet for update)  Subjective functional status/changes:   [] No changes reported    The patient reports she did five minutes off/five minutes on pushing workstation on wheels at work yesterday; she was fatigued and hurt along armpits and shoulders by the end of this. She also notes she carried iPad around for one hour yesterday, which she felt a bit along the front of her L forearm but otherwise tolerated well. She did \"okay\" after last visit; she took half a flexeril but no anti-inflammatories. 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 She notes home exercises continue to challenge her well, and she continues to use these to manage symptoms. OBJECTIVE    11 min Therapeutic Exercise:  [x] See flow sheet :   Rationale: increase ROM, increase strength, improve coordination, and increase proprioception to improve the patients ability to perform ADLs/IADLs and complete work tasks    38 min Therapeutic Activity:  []  See flow sheet : Includes time spent reviewing recent events, anatomy/pathophysiology of functional dry needling to include potential systemic effects, and on pain neuroscience education and graded activity principles to tolerance.  Additionally, includes time spent educating/demonstrating/trialing pillow positioning in L sidelying, pushing/pulling workstation on wheels with simulation using work cart, and reaching overhead into [employer] station with bilateral versus unilateral UEs. Patient verbalizing and demonstrating understanding of provided education with 100% accuracy using teach back method.    Rationale: increase ROM, increase strength, improve coordination, improve balance, and increase proprioception  to improve the patients ability to perform ADLs/IADLs and complete work tasks     14 min Manual Therapy: Bilateral cervical PA/lateral/rotational joint mobilizations (Grade II-III, C2-7); prone mid-thoracic joint mobilizations (T4-10, (-) cavitation, Grade III-IV); L shoulder PROM, all planes to tolerance; L glenohumeral inferior/AP joint mobilizations (Grade III-IV); STM/TPR bilateral cervical paraspinals/L upper trapezius/levator scapula/scalenes/pectoralis major/minor/supraspinatus/infraspinatus/teres minor/subscapularis/biceps/triceps muscle bellies; supine L first rib inferior joint mobilizations (Grade III-IV); L subscapularis MWM (pin + stretch with L shoulder ER PROM); L pectoralis major MWM (pin + stretch with L shoulder horizontal abduction at 120 degrees of elevation); manual L UE median/radial nerve glides (20x each position)   Rationale: decrease pain, increase ROM, increase tissue extensibility, decrease edema , decrease trigger points, and increase postural awareness  to improve the patients ability to perform ADLs/IADLs and complete work tasks    NT   min Dry Needling without E-Stim         (not to be included in total timed codes or 1:1 Treatment Time)   NT  min Dry Needling with E-Stim: Attended      Type:       Muscles:    NT min Dry Needling with E-Stim: Unattended      Type: Direct current (10 Hz, intensity to tolerance)      Muscles: See below     Billing:  []  27851 (1-2 muscles)         [x]  32875 (3+ muscles)         [x]  Script obtained from MD specifying \"Dry Needling\"    Written Informed Consent Obtained and Document Included in Chart: YES    Procedure: Intramuscular Dry Needling was done with a 30-50 gauge solid monofilament needle,                       under aseptic technique      Rationale/Necessity: Decrease pain, Increase ROM, Increase/Improve Function, Increase Tissue Extensibility, Reduce Trigger Points, Reduce Spasm, Restore Muscle Balance, and Reduce Headaches    Muscles Treated:  []  Bilateral: NT                                 []  Right: NT                                 [x]  Left: Supraspinatus/infraspinatus/teres major/triceps; upper trapezius/pectoralis major/latissimus dorsi (trigger point)                           [x]  Hemostasis applied after each needle application     Response: Reduce Pain (post treatment pain level: 3/10), Localized Twitch Response, Increased ROM, Increased Tolerance to Exercise/Activity, Increased Motor Recruitment, Improve Soft Tissue Mobility, and Reduced Tightness    Education: Purpose or rationale of dry needling                      Side effects and possible adverse effects of the treatment                      The need to report the use of blood thinners and/or immunosupressant medications                      How to respond to possible adverse effects of the treatment                       Self-treatment of post needling soreness (ice/heat, stretching, activity modification)                      Opportunity was given to ask questions; all questions were answered           With   [x] TE   [x] TA   [] Neuro   [] SC   [] other: Patient Education: [x] Review HEP    [] Progressed/Changed HEP based on:   [] positioning   [] body mechanics   [] transfers   [] heat/ice application    [] other:      Other Objective/Functional Measures: 1-2 in neck; patient notes \"it feels different, more sore\" in upper neck/L posterior shoulder post-manual intervention     Pain Level (0-10 scale) post treatment: 1-2 \"soreness\" into L posterior shoulder/L UE with multiplanar cervical AROM seated at edge of mat table post-session today    ASSESSMENT/Changes in Function:   The patient tolerated therapy visit well at this date. Noted increased L-sided cervical paraspinal and upper trapezius/scalenes muscle turgor versus previously during manual intervention. Emphasis on educating/demonstrating/trialing various work tasks to improve pushing/pulling/reaching technique; patient demonstrates UE-dominant movement patterns and educated on positioning to improve use of whole body kinetic chain. Patient noting significant global fatigue, decrease in familiar symptoms post-session today. Continue to progress as tolerated. Patient will continue to benefit from skilled PT services to modify and progress therapeutic interventions, address functional mobility deficits, address ROM deficits, address strength deficits, analyze and address soft tissue restrictions, analyze and cue movement patterns, analyze and modify body mechanics/ergonomics, assess and modify postural abnormalities, address imbalance/dizziness, and instruct in home and community integration to attain remaining goals. [x]  See Plan of Care  []  See progress note/recertification  []  See Discharge Summary         Progress towards goals / Updated goals:    Short Term Goals: To be accomplished in 6-8 treatments: The patient will demonstrate understanding of and compliance with updated and progressive HEP toward improved participation in physical therapy plan of care. - Progressing              The patient will demonstrate bilateral thoracic rotation AROM >= 60 degrees toward improved mechanics with bending/lifting tasks and rolling while sleeping. - Progressing              The patient will demonstrate (-) L median/radial nerve tension tests toward improved mechanics with overhead reaching and ADLs/IADLs. - Progressing  Long Term Goals:  To be accomplished in 16-20 treatments: The patient will demonstrate multiplanar L UE/periscapular strength increased by >= 1/2 MMT grade toward improved endurance with functional activities such as ADLs/IADLs and work tasks. - Progressing              The patient will demonstrate L  strength >= 50 lbs. average over two trials toward improved functional endurance with ADLs/IADLs and work tasks. 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 - Progressing              The patient will score >= 63 on neck FOTO and >= 71 on shoulder FOTO to demonstrate significantly improved subjective report of function. - Progressing  Frequency / Duration: Patient to be seen 2 times per week for 16-20 treatments.     PLAN  [x]  Upgrade activities as tolerated     [x]  Continue plan of care  [x]  Update interventions per flow sheet       []  Discharge due to:_  []  Other:_      Magali Dinh, PT, DPT 3/30/2023

## 2023-04-24 ENCOUNTER — HOSPITAL ENCOUNTER (OUTPATIENT)
Dept: PHYSICAL THERAPY | Age: 38
Discharge: HOME | End: 2023-04-24
Payer: OTHER MISCELLANEOUS

## 2023-04-24 PROCEDURE — 20561 NDL INSJ W/O NJX 3+ MUSC: CPT

## 2023-04-24 PROCEDURE — 97530 THERAPEUTIC ACTIVITIES: CPT

## 2023-04-24 PROCEDURE — 97110 THERAPEUTIC EXERCISES: CPT

## 2023-04-24 PROCEDURE — 97140 MANUAL THERAPY 1/> REGIONS: CPT

## 2023-04-24 PROCEDURE — 97032 APPL MODALITY 1+ESTIM EA 15: CPT

## 2023-04-24 NOTE — PROGRESS NOTES
PT DAILY TREATMENT NOTE 2-15    Patient Name: Arabella Pac  Date:2023  : 1985  [x]  Patient  Verified  Payor: [employer] / Plan: [id number] / Product Type: Workers Comp /    In time: 8189  Out time: 1218  Total Treatment Time (min): 98  Visit #: 8    Treatment Area: Neck pain [M54.2]  Left arm pain [M79.602]    SUBJECTIVE  Pain Level (0-10 scale): 3 in R-sided neck/shoulder p! Any medication changes, allergies to medications, adverse drug reactions, diagnosis change, or new procedure performed?: [x] No    [] Yes (see summary sheet for update)  Subjective functional status/changes:   [] No changes reported    The patient reports she was notified that worker's compensation will not longer cover her physical therapy appointments, and she is self-paying for today's appointment. She notes that she has been doing exercises \"religiously\" and feels she has gotten stronger since last visit. 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 She feels like she is reaching better overhead as well, and picking up fluid bags has been easier. She feels she is 95% back to prior level of function at this point. She returns to full duty in two weekends. OBJECTIVE    23 min Therapeutic Exercise:  [x] See flow sheet :   Rationale: increase ROM, increase strength, improve coordination, and increase proprioception to improve the patients ability to perform ADLs/IADLs and complete work tasks    30 min Therapeutic Activity:  []  See flow sheet : Includes time spent reviewing recent events, anatomy/pathophysiology of functional dry needling to include potential systemic effects, and on pain neuroscience education and graded activity principles to tolerance.  Additionally, includes time spent educating patient on physical therapy plan of care and follow up/return to work plan, as well as on appropriate bending/lifting mechanics per patient request. Patient verbalizing and demonstrating understanding of provided education with 100% accuracy using teach back method.    Rationale: increase ROM, increase strength, improve coordination, improve balance, and increase proprioception  to improve the patients ability to perform ADLs/IADLs and complete work tasks     15 min Manual Therapy: Bilateral cervical PA/lateral/rotational joint mobilizations (Grade II-III, C2-7); prone mid-thoracic joint mobilizations (T4-10, (-) cavitation, Grade III-IV); L shoulder PROM, all planes to tolerance; L glenohumeral inferior/AP joint mobilizations (Grade III-IV); STM/TPR bilateral cervical paraspinals/L upper trapezius/levator scapula/scalenes/pectoralis major/minor/supraspinatus/infraspinatus/teres minor/subscapularis/biceps/triceps muscle bellies; supine L first rib inferior joint mobilizations (Grade III-IV); L subscapularis MWM (pin + stretch with L shoulder ER PROM); L pectoralis major MWM (pin + stretch with L shoulder horizontal abduction at 120 degrees of elevation); manual L UE median/radial nerve glides (20x each position)   Rationale: decrease pain, increase ROM, increase tissue extensibility, decrease edema , decrease trigger points, and increase postural awareness  to improve the patients ability to perform ADLs/IADLs and complete work tasks    NT   min Dry Needling without E-Stim         (not to be included in total timed codes or 1:1 Treatment Time)   30  min Dry Needling with E-Stim: Attended      Type: Direct current (2 Hz, intensity to tolerance)      Muscles: See below   NT min Dry Needling with E-Stim: Unattended      Type: Direct current (10 Hz, intensity to tolerance)      Muscles: See below     Billing:  []  [id number] (1-2 muscles)         [x]  84871 (3+ muscles)         [x]  Script obtained from MD specifying \"Dry Needling\"    Written Informed Consent Obtained and Document Included in Chart: YES    Procedure: Intramuscular Dry Needling was done with a 30-50 gauge solid monofilament needle,                       under aseptic technique      Rationale/Necessity: Decrease pain, Increase ROM, Increase/Improve Function, Increase Tissue Extensibility, Reduce Trigger Points, Reduce Spasm, Restore Muscle Balance, and Reduce Headaches    Muscles Treated:  []  Bilateral: NT                                 [x]  Right: Supraspinatus/infraspinatus/teres major/triceps; upper trapezius/pectoralis major/latissimus dorsi (trigger point)                                 []  Left: NT                 [x]  Hemostasis applied after each needle application     Response: Reduce Pain (post treatment pain level: 2-3/10), Localized Twitch Response, Increased ROM, Increased Tolerance to Exercise/Activity, Increased Motor Recruitment, Improve Soft Tissue Mobility, and Reduced Tightness    Education: Purpose or rationale of dry needling                      Side effects and possible adverse effects of the treatment                      The need to report the use of blood thinners and/or immunosupressant medications                      How to respond to possible adverse effects of the treatment                       Self-treatment of post needling soreness (ice/heat, stretching, activity modification)                      Opportunity was given to ask questions; all questions were answered           With   [x] TE   [x] TA   [] Neuro   [] SC   [] other: Patient Education: [x] Review HEP    [] Progressed/Changed HEP based on:   [] positioning   [] body mechanics   [] transfers   [] heat/ice application    [] other:      Other Objective/Functional Measures: Patient noting reduction in R UE symptoms to 2/10 on VAS with multiplanar cervical/R UE AROM post-manual intervention     Pain Level (0-10 scale) post treatment: 2-3 \"soreness\" into R posterior shoulder/R UE, with multiplanar R UE AROM seated at edge of mat table post-session today    ASSESSMENT/Changes in Function:   The patient tolerated therapy visit well at this date. Noted increased R-sided cervical paraspinal and upper trapezius/scalenes muscle turgor versus previously during manual intervention. Patient demonstrates improved maintenance of neutral spine with use of wall cue during hip hinging. Patient benefits from review of proper body mechanics during bending/lifting tasks for improved tolerance to work tasks. Continued trigger point/functional dry needling treatment to improve neuromuscular modulation of symptoms; patient tolerated well with minimal noted redness/bruising/bleeding, however reporting mildly increased R posterior shoulder/UE \"soreness\" and mild and transient onset of dizziness post-dry needling treatment with emphasis on R shoulder needling. Educated to avoid use of ice/anti-inflammatories for 24-48 hours to allow healing response status post functional dry needling intervention. Patient noting significant global fatigue, mild decrease in familiar R UE symptoms post-session today. Continue to progress as tolerated. Patient will continue to benefit from skilled PT services to modify and progress therapeutic interventions, address functional mobility deficits, address ROM deficits, address strength deficits, analyze and address soft tissue restrictions, analyze and cue movement patterns, analyze and modify body mechanics/ergonomics, assess and modify postural abnormalities, address imbalance/dizziness, and instruct in home and community integration to attain remaining goals. [x]  See Plan of Care  []  See progress note/recertification  []  See Discharge Summary         Progress towards goals / Updated goals:    Short Term Goals: To be accomplished in 6-8 treatments: The patient will demonstrate understanding of and compliance with updated and progressive HEP toward improved participation in physical therapy plan of care.  - Met The patient will demonstrate bilateral thoracic rotation AROM >= 60 degrees toward improved mechanics with bending/lifting tasks and rolling while sleeping. - Continue goal              The patient will demonstrate (-) L median/radial nerve tension tests toward improved mechanics with overhead reaching and ADLs/IADLs. - Met  Long Term Goals: To be accomplished in 16-20 treatments: The patient will demonstrate multiplanar L UE/periscapular strength increased by >= 1/2 MMT grade toward improved endurance with functional activities such as ADLs/IADLs and work tasks. - Progressing              The patient will demonstrate L  strength >= 50 lbs. average over two trials toward improved functional endurance with ADLs/IADLs and work tasks. 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 - Progressing              The patient will score >= 63 on neck FOTO and >= 71 on shoulder FOTO to demonstrate significantly improved subjective report of function. - Progressing  Frequency / Duration: Patient to be seen 2 times per week for 8-12 treatments.     PLAN  [x]  Upgrade activities as tolerated     [x]  Continue plan of care  [x]  Update interventions per flow sheet       []  Discharge due to:_  []  Other:_      Joseph Polanco, PT, DPT 4/24/2023

## 2023-05-02 ENCOUNTER — HOSPITAL ENCOUNTER (OUTPATIENT)
Dept: PHYSICAL THERAPY | Age: 38
Discharge: HOME | End: 2023-05-02
Payer: OTHER MISCELLANEOUS

## 2023-05-02 PROCEDURE — 97140 MANUAL THERAPY 1/> REGIONS: CPT

## 2023-05-02 PROCEDURE — 9990 CHARGE CONVERSION

## 2023-05-02 PROCEDURE — 97530 THERAPEUTIC ACTIVITIES: CPT

## 2023-05-02 PROCEDURE — 97110 THERAPEUTIC EXERCISES: CPT

## 2023-05-05 ENCOUNTER — APPOINTMENT (OUTPATIENT)
Facility: HOSPITAL | Age: 38
End: 2023-05-05

## 2023-05-09 ENCOUNTER — OFFICE VISIT (OUTPATIENT)
Age: 38
End: 2023-05-09
Payer: COMMERCIAL

## 2023-05-09 VITALS
BODY MASS INDEX: 21.73 KG/M2 | DIASTOLIC BLOOD PRESSURE: 66 MMHG | OXYGEN SATURATION: 100 % | SYSTOLIC BLOOD PRESSURE: 103 MMHG | HEART RATE: 74 BPM | WEIGHT: 135.2 LBS | TEMPERATURE: 98.5 F | HEIGHT: 66 IN | RESPIRATION RATE: 14 BRPM

## 2023-05-09 DIAGNOSIS — M79.674 PAIN OF TOE OF RIGHT FOOT: Primary | ICD-10-CM

## 2023-05-09 DIAGNOSIS — M79.7 FIBROMYALGIA: ICD-10-CM

## 2023-05-09 PROCEDURE — 99213 OFFICE O/P EST LOW 20 MIN: CPT | Performed by: INTERNAL MEDICINE

## 2023-05-09 RX ORDER — QUERCETIN DIHYDRATE 100 %
1 POWDER (GRAM) MISCELLANEOUS NIGHTLY
COMMUNITY

## 2023-05-09 RX ORDER — GREEN TEA/HOODIA GORDONII 315-12.5MG
2 CAPSULE ORAL DAILY
COMMUNITY

## 2023-05-09 SDOH — ECONOMIC STABILITY: INCOME INSECURITY: HOW HARD IS IT FOR YOU TO PAY FOR THE VERY BASICS LIKE FOOD, HOUSING, MEDICAL CARE, AND HEATING?: NOT HARD AT ALL

## 2023-05-09 SDOH — ECONOMIC STABILITY: FOOD INSECURITY: WITHIN THE PAST 12 MONTHS, YOU WORRIED THAT YOUR FOOD WOULD RUN OUT BEFORE YOU GOT MONEY TO BUY MORE.: NEVER TRUE

## 2023-05-09 SDOH — ECONOMIC STABILITY: FOOD INSECURITY: WITHIN THE PAST 12 MONTHS, THE FOOD YOU BOUGHT JUST DIDN'T LAST AND YOU DIDN'T HAVE MONEY TO GET MORE.: NEVER TRUE

## 2023-05-09 SDOH — ECONOMIC STABILITY: HOUSING INSECURITY
IN THE LAST 12 MONTHS, WAS THERE A TIME WHEN YOU DID NOT HAVE A STEADY PLACE TO SLEEP OR SLEPT IN A SHELTER (INCLUDING NOW)?: NO

## 2023-05-09 ASSESSMENT — PATIENT HEALTH QUESTIONNAIRE - PHQ9
1. LITTLE INTEREST OR PLEASURE IN DOING THINGS: 0
SUM OF ALL RESPONSES TO PHQ QUESTIONS 1-9: 0
SUM OF ALL RESPONSES TO PHQ QUESTIONS 1-9: 0
2. FEELING DOWN, DEPRESSED OR HOPELESS: 0
SUM OF ALL RESPONSES TO PHQ QUESTIONS 1-9: 0
SUM OF ALL RESPONSES TO PHQ QUESTIONS 1-9: 0
SUM OF ALL RESPONSES TO PHQ9 QUESTIONS 1 & 2: 0

## 2023-05-09 NOTE — PATIENT INSTRUCTIONS
Would use ice to sore toes/foot for 15 minutes twice daily. Ice works better than heat at treating inflammation and swelling.
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
## 2023-05-09 NOTE — PROGRESS NOTES
1. \"Have you been to the ER, urgent care clinic since your last visit? Hospitalized since your last visit? \" Yes see encounters for ED    2. \"Have you seen or consulted any other health care providers outside of the [address] [place] [address] since your last visit? \" Yes [facility], see encounters      3. For patients aged 39-70: Has the patient had a colonoscopy / FIT/ Cologuard? NA - based on age      If the patient is female:    4. For patients aged 41-77: Has the patient had a mammogram within the past 2 years? NA - based on age or sex      11. For patients aged 21-65: Has the patient had a pap smear? Yes - Care Gap present.  Rooming MA/LPN to request most recent results

## 2023-05-16 ENCOUNTER — HOSPITAL ENCOUNTER (OUTPATIENT)
Facility: HOSPITAL | Age: 38
Setting detail: RECURRING SERIES
Discharge: HOME | End: 2023-05-19
Payer: COMMERCIAL

## 2023-05-16 PROCEDURE — 97162 PT EVAL MOD COMPLEX 30 MIN: CPT

## 2023-05-16 PROCEDURE — 97110 THERAPEUTIC EXERCISES: CPT

## 2023-05-16 PROCEDURE — 97140 MANUAL THERAPY 1/> REGIONS: CPT

## 2023-05-16 NOTE — THERAPY EVALUATION
[facility]  [address] [place]  Phone: [phone]   Fax: [phone]      PHYSICAL THERAPY - MEDICARE EVALUATION/PLAN OF CARE NOTE (updated 3/23)    Date: 2023        Patient Name:  Kade Grayson :  1985   Medical   Diagnosis:  R foot pain (M79.671) Treatment Diagnosis:  M79.671  RIGHT FOOT PAIN    Referral Source:  [facility]iv[facility] Provider #:  [id number]                Insurance: Payor: Santos Moore / Plan: Frankie Philippe [address] [place] [address] / Product Type: *No Product type* /      Patient  verified yes     Visit #   Current  / Total 1 20   Time   In / Out 1046 1159   Total Treatment Time 73   Total Timed Codes 25   1:1 Treatment Time 25      [facility] Totals Reminder:  bill using total billable   min of TIMED therapeutic procedures and modalities. 8-22 min = 1 unit; 23-37 min = 2 units; 38-52 min = 3 units;  53-67 min = 4 units; 68-82 min = 5 units     *Patient requested to leave by approximately 12:00pm secondary to family schedule at this date*    SUBJECTIVE  Pain Level (0-10 scale): 5/10 currently in R medial arch/heel/third/fourth digit.  The patient describes symptoms as \"grabbing, 10/10 excruciating\" when intermittently hits, and \"aching\" which lasts afterward.  []constant [x]intermittent []improving []worsening []no change since onset    Any medication changes, allergies to medications, adverse drug reactions, diagnosis change, or new procedure performed?: [x] No    [] Yes (see summary sheet for update)  Medications: Verified on Patient Summary List    Subjective functional status/changes:       Start of Care: 2023  Onset Date: 2023  PLOF: Independent, active work  PMHx/Surgical Hx/Comorbidites: Fibromyalgia, history of thyroid dysfunction, asthma, lupus, migraines, history of R occipital neuralgia, hypermobility    *The patient is R hand dominant*    The patient reports

## 2023-05-18 ENCOUNTER — APPOINTMENT (OUTPATIENT)
Facility: HOSPITAL | Age: 38
End: 2023-05-18
Payer: COMMERCIAL

## 2023-05-22 ENCOUNTER — HOSPITAL ENCOUNTER (OUTPATIENT)
Facility: HOSPITAL | Age: 38
Setting detail: RECURRING SERIES
Discharge: HOME | End: 2023-05-25
Payer: COMMERCIAL

## 2023-05-22 PROCEDURE — 97110 THERAPEUTIC EXERCISES: CPT

## 2023-05-22 PROCEDURE — 97140 MANUAL THERAPY 1/> REGIONS: CPT
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
## 2023-05-25 ENCOUNTER — APPOINTMENT (OUTPATIENT)
Facility: HOSPITAL | Age: 38
End: 2023-05-25
Payer: COMMERCIAL

## 2023-05-30 ENCOUNTER — HOSPITAL ENCOUNTER (OUTPATIENT)
Facility: HOSPITAL | Age: 38
Setting detail: RECURRING SERIES
Discharge: HOME | End: 2023-06-02
Payer: COMMERCIAL

## 2023-05-30 PROCEDURE — 97140 MANUAL THERAPY 1/> REGIONS: CPT

## 2023-05-30 PROCEDURE — 97110 THERAPEUTIC EXERCISES: CPT

## 2023-05-30 PROCEDURE — 97530 THERAPEUTIC ACTIVITIES: CPT

## 2023-05-30 NOTE — PROGRESS NOTES
PHYSICAL THERAPY - MEDICARE DAILY TREATMENT NOTE (updated 3/23)    Date: 2023        Patient Name:  Dallas Daigle :  1985   Medical   Diagnosis:  R foot pain (M79.671) Treatment Diagnosis:  M79.671  RIGHT FOOT PAIN    Referral Source:  Juan Early* Insurance:   Payor: Miguel Mac / Plan: [address] / Product Type: *No Product type* /                   Patient  verified yes     Visit #   Current  / Total 3 20   Time   In / Out 0932 1019   Total Treatment Time 47   Total Timed Codes 47   1:1 Treatment Time 52      [facility] Totals Reminder:  bill using total billable   min of TIMED therapeutic procedures and modalities. 8-22 min = 1 unit; 23-37 min = 2 units; 38-52 min = 3 units; 53-67 min = 4 units; 68-82 min = 5 units        SUBJECTIVE    Pain Level (0-10 scale): 4 \"aching\" in R foot    Any medication changes, allergies to medications, adverse drug reactions, diagnosis change, or new procedure performed?: [x] No    [] Yes (see summary sheet for update)  Medications: Verified on Patient Summary List    Subjective functional status/changes: The patient reports her R foot has been doing better since initiating physical therapy services and home exercises, and after second appointment her foot did not hurt at all; however she continues to have increased \"aching and crunching\" when walking more at work. She notes she did well over the first 1-2 days of her previous work shift, then had a \"crunch\" in her foot on , and since then she has had more popping and aching in the fourth toe. She notes her foot swells more at the end of her days. She feels 30-35% improvement in R foot pain over physical therapy plan of care; she requests to make today her last physical therapy visit for the moment.     OBJECTIVE    Ankle AROM:                           R              Dorsiflexion                   8     LOWER QUARTER                            MUSCLE STRENGTH  KEY

## 2023-08-29 DIAGNOSIS — Z11.1 SCREENING FOR TUBERCULOSIS: Primary | ICD-10-CM

## 2023-09-06 ENCOUNTER — TELEPHONE (OUTPATIENT)
Age: 38
End: 2023-09-06

## 2023-09-06 NOTE — TELEPHONE ENCOUNTER
Pt sent a My Chart message on 8-28-23 and would like to know why she can't get a call back. Pt also needs a physical for school. Six more weeks prior to clinicals. See My Chart message for what is needed. The only thing missing from My Chart message is that she also needs a physical.    Please call pt to go over and help in anyway you can. Thanks.

## 2023-09-08 ENCOUNTER — OFFICE VISIT (OUTPATIENT)
Age: 38
End: 2023-09-08

## 2023-09-08 VITALS
RESPIRATION RATE: 20 BRPM | HEIGHT: 66 IN | HEART RATE: 73 BPM | BODY MASS INDEX: 22.08 KG/M2 | WEIGHT: 137.4 LBS | SYSTOLIC BLOOD PRESSURE: 95 MMHG | TEMPERATURE: 97.4 F | DIASTOLIC BLOOD PRESSURE: 61 MMHG | OXYGEN SATURATION: 99 %

## 2023-09-08 DIAGNOSIS — E03.8 SUBCLINICAL HYPOTHYROIDISM: ICD-10-CM

## 2023-09-08 DIAGNOSIS — Z02.0 SCHOOL PHYSICAL EXAM: Primary | ICD-10-CM

## 2023-09-08 DIAGNOSIS — M79.7 FIBROMYALGIA: ICD-10-CM

## 2023-09-08 DIAGNOSIS — J45.20 MILD INTERMITTENT ASTHMA, UNCOMPLICATED: ICD-10-CM

## 2023-09-08 SDOH — ECONOMIC STABILITY: FOOD INSECURITY: WITHIN THE PAST 12 MONTHS, THE FOOD YOU BOUGHT JUST DIDN'T LAST AND YOU DIDN'T HAVE MONEY TO GET MORE.: NEVER TRUE

## 2023-09-08 SDOH — ECONOMIC STABILITY: INCOME INSECURITY: HOW HARD IS IT FOR YOU TO PAY FOR THE VERY BASICS LIKE FOOD, HOUSING, MEDICAL CARE, AND HEATING?: NOT HARD AT ALL

## 2023-09-08 SDOH — ECONOMIC STABILITY: FOOD INSECURITY: WITHIN THE PAST 12 MONTHS, YOU WORRIED THAT YOUR FOOD WOULD RUN OUT BEFORE YOU GOT MONEY TO BUY MORE.: NEVER TRUE

## 2023-09-08 ASSESSMENT — PATIENT HEALTH QUESTIONNAIRE - PHQ9
SUM OF ALL RESPONSES TO PHQ9 QUESTIONS 1 & 2: 0
1. LITTLE INTEREST OR PLEASURE IN DOING THINGS: 0
SUM OF ALL RESPONSES TO PHQ QUESTIONS 1-9: 0
2. FEELING DOWN, DEPRESSED OR HOPELESS: 0
SUM OF ALL RESPONSES TO PHQ QUESTIONS 1-9: 0

## 2023-09-08 NOTE — PROGRESS NOTES
Brisa Molina is a 41 y.o. female who presents for evaluation of school physical.  Last seen by me may 9, 2023. She has overall done well since then, though did have a bout of gi distress a few months ago, likely viral gastroenteritis. She has recovered well from that. 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       ROS:  Constitutional: negative for fevers, chills, anorexia and weight loss  Eyes:   negative for visual disturbance and irritation  ENT:   negative for tinnitus,sore throat,nasal congestion,ear pain,hoarseness  Respiratory:  negative for cough, hemoptysis, dyspnea,wheezing  CV:   negative for chest pain, palpitations, lower extremity edema  GI:   negative for nausea, vomiting, diarrhea, abdominal pain,melena  Genitourinary: negative for frequency, dysuria and hematuria  Musculoskel: negative for myalgias, arthralgias, back pain, muscle weakness, joint pain  Neurological:  negative for headaches, dizziness, focal weakness, numbness  Psychiatric:     Negative for depression or anxiety      Past Medical History:   Diagnosis Date    Abdominal pain, other specified site     Asthma 2018    Calculus of kidney     RIGHT    Fibromyalgia 06/2016    [facility]    GERD (gastroesophageal reflux disease)     Hydronephrosis     Hypoglycemia, unspecified     Irregular menstrual cycle     Lumbago     Lupus ([address]) 06/2016    [facility]    Migraine with aura, without mention of intractable migraine without mention of status migrainosus     Other ill-defined conditions(799.89)     mitral valve prolapse    Sacroiliitis, not elsewhere classified ([address])     Thyroid disease        Past Surgical History:   Procedure Laterality Date    ADENOIDECTOMY      COLONOSCOPY FLX DX W/COLLJ SPEC WHEN PFRMD  4/7/2014         EGD TRANSORAL BIOPSY SINGLE/MULTIPLE  4/7/2014         TONSILLECTOMY

## 2023-09-08 NOTE — PROGRESS NOTES
1. \"Have you been to the ER, urgent care clinic since your last visit? Hospitalized since your last visit? \" No    2. \"Have you seen or consulted any other health care providers outside of the [address] [place] [address] since your last visit? \" No     3. For patients aged 43-73: Has the patient had a colonoscopy / FIT/ Cologuard? NA - based on age      If the patient is female:    4. For patients aged 43-66: Has the patient had a mammogram within the past 2 years? NA - based on age or sex      11. For patients aged 21-65: Has the patient had a pap smear?  Yes - no Care Gap present

## 2023-09-13 LAB
M TB IFN-G BLD-IMP: NEGATIVE
M TB IFN-G CD4+ T-CELLS BLD-ACNC: 0.06 IU/ML
M TBIFN-G CD4+ CD8+T-CELLS BLD-ACNC: 0.08 IU/ML
QUANTIFERON CRITERIA: NORMAL
QUANTIFERON MITOGEN VALUE: >10 IU/ML
QUANTIFERON NIL VALUE: 0.05 IU/ML

## 2023-12-26 ENCOUNTER — HOSPITAL ENCOUNTER (OUTPATIENT)
Facility: HOSPITAL | Age: 38
Discharge: HOME | End: 2023-12-29
Attending: INTERNAL MEDICINE

## 2023-12-26 DIAGNOSIS — R10.11 INTERMITTENT RIGHT UPPER QUADRANT ABDOMINAL PAIN: ICD-10-CM

## 2024-01-27 ENCOUNTER — NURSE TRIAGE (OUTPATIENT)
Dept: OTHER | Facility: CLINIC | Age: 39
End: 2024-01-27

## 2024-01-27 NOTE — TELEPHONE ENCOUNTER
[facility]    Current Symptoms: Pt reports bilateral ear pain, sore throat and headache. She is concerned because her hearing has decreased today.     She would like to know which Urgent Care locations are covered under her insurance plan.    Onset: 3 days ago, worsening    Associated Symptoms: reduced activity    Pain Severity: 6 out of 10    Temperature: Low grade fever, 99s    What has been tried: Hydration, peroxide eardrops    Pregnant: Unknown    Recommended disposition: See PCP within 24 Hours    Care advice provided, patient verbalizes understanding; denies any other questions or concerns; instructed to call back for any new or worsening symptoms.    Patient/caller agrees to proceed to nearest [facility]     Attention Provider:  Thank you for allowing me to participate in the care of your patient. The patient was connected to triage in response to symptoms provided.   Please do not respond through this encounter as the response is not directed to a shared pool.    Reason for Disposition   Earache  (Exceptions: brief ear pain of < 60 minutes duration, earache occurring during air travel    Protocols used: Earache-ADULT-

## 2024-01-28 ENCOUNTER — OFFICE VISIT (OUTPATIENT)
Age: 39
End: 2024-01-28

## 2024-01-28 VITALS
HEART RATE: 80 BPM | WEIGHT: 141 LBS | DIASTOLIC BLOOD PRESSURE: 76 MMHG | TEMPERATURE: 98.7 F | HEIGHT: 66 IN | RESPIRATION RATE: 18 BRPM | SYSTOLIC BLOOD PRESSURE: 113 MMHG | BODY MASS INDEX: 22.66 KG/M2 | OXYGEN SATURATION: 100 %

## 2024-01-28 DIAGNOSIS — L50.9 HIVES: ICD-10-CM

## 2024-01-28 DIAGNOSIS — H66.92 LEFT OTITIS MEDIA, UNSPECIFIED OTITIS MEDIA TYPE: Primary | ICD-10-CM

## 2024-01-28 DIAGNOSIS — H61.23 BILATERAL IMPACTED CERUMEN: ICD-10-CM

## 2024-01-28 DIAGNOSIS — J02.9 SORE THROAT: ICD-10-CM

## 2024-01-28 LAB
STREP PYOGENES DNA, POC: NEGATIVE
VALID INTERNAL CONTROL, POC: NORMAL

## 2024-01-28 RX ORDER — PREDNISONE 10 MG/1
TABLET ORAL
Qty: 1 EACH | Refills: 0 | Status: SHIPPED | OUTPATIENT
Start: 2024-01-28

## 2024-01-28 RX ORDER — AMOXICILLIN AND CLAVULANATE POTASSIUM 875; 125 MG/1; MG/1
1 TABLET, FILM COATED ORAL 2 TIMES DAILY
Qty: 20 TABLET | Refills: 0 | Status: SHIPPED | OUTPATIENT
Start: 2024-01-28 | End: 2024-02-07

## 2024-02-03 ENCOUNTER — HOSPITAL ENCOUNTER (EMERGENCY)
Facility: HOSPITAL | Age: 39
Discharge: HOME | End: 2024-02-03
Attending: EMERGENCY MEDICINE
Payer: COMMERCIAL

## 2024-02-03 VITALS
BODY MASS INDEX: 25.09 KG/M2 | TEMPERATURE: 98.1 F | OXYGEN SATURATION: 98 % | HEART RATE: 79 BPM | SYSTOLIC BLOOD PRESSURE: 106 MMHG | HEIGHT: 65 IN | WEIGHT: 150.57 LBS | DIASTOLIC BLOOD PRESSURE: 72 MMHG | RESPIRATION RATE: 16 BRPM

## 2024-02-03 DIAGNOSIS — J01.90 ACUTE SINUSITIS, RECURRENCE NOT SPECIFIED, UNSPECIFIED LOCATION: Primary | ICD-10-CM

## 2024-02-03 DIAGNOSIS — R42 ORTHOSTATIC LIGHTHEADEDNESS: ICD-10-CM

## 2024-02-03 DIAGNOSIS — B34.9 VIRAL SYNDROME: ICD-10-CM

## 2024-02-03 DIAGNOSIS — H69.90 DYSFUNCTION OF EUSTACHIAN TUBE, UNSPECIFIED LATERALITY: ICD-10-CM

## 2024-02-03 LAB
ALBUMIN SERPL-MCNC: 4 G/DL (ref 3.5–5)
ALBUMIN/GLOB SERPL: 1 (ref 1.1–2.2)
ALP SERPL-CCNC: 88 U/L (ref 45–117)
ALT SERPL-CCNC: 21 U/L (ref 12–78)
ANION GAP SERPL CALC-SCNC: 1 MMOL/L (ref 5–15)
AST SERPL-CCNC: 9 U/L (ref 15–37)
BASOPHILS # BLD: 0 K/UL (ref 0–0.1)
BASOPHILS NFR BLD: 1 % (ref 0–1)
BILIRUB SERPL-MCNC: 0.5 MG/DL (ref 0.2–1)
BUN SERPL-MCNC: 14 MG/DL (ref 6–20)
BUN/CREAT SERPL: 14 (ref 12–20)
CALCIUM SERPL-MCNC: 8.8 MG/DL (ref 8.5–10.1)
CHLORIDE SERPL-SCNC: 106 MMOL/L (ref 97–108)
CO2 SERPL-SCNC: 33 MMOL/L (ref 21–32)
CREAT SERPL-MCNC: 1 MG/DL (ref 0.55–1.02)
DIFFERENTIAL METHOD BLD: NORMAL
EKG ATRIAL RATE: 79 BPM
EKG DIAGNOSIS: NORMAL
EKG P AXIS: 33 DEGREES
EKG P-R INTERVAL: 128 MS
EKG Q-T INTERVAL: 380 MS
EKG QRS DURATION: 82 MS
EKG QTC CALCULATION (BAZETT): 435 MS
EKG R AXIS: 42 DEGREES
EKG T AXIS: 59 DEGREES
EKG VENTRICULAR RATE: 79 BPM
EOSINOPHIL # BLD: 0.1 K/UL (ref 0–0.4)
EOSINOPHIL NFR BLD: 1 % (ref 0–7)
ERYTHROCYTE [DISTWIDTH] IN BLOOD BY AUTOMATED COUNT: 12.3 % (ref 11.5–14.5)
FLUAV AG NPH QL IA: NEGATIVE
FLUBV AG NOSE QL IA: NEGATIVE
GLOBULIN SER CALC-MCNC: 4 G/DL (ref 2–4)
GLUCOSE SERPL-MCNC: 147 MG/DL (ref 65–100)
HCG SERPL QL: NEGATIVE
HCT VFR BLD AUTO: 41.7 % (ref 35–47)
HGB BLD-MCNC: 13.4 G/DL (ref 11.5–16)
IMM GRANULOCYTES # BLD AUTO: 0 K/UL (ref 0–0.04)
IMM GRANULOCYTES NFR BLD AUTO: 0 % (ref 0–0.5)
LYMPHOCYTES # BLD: 2 K/UL (ref 0.8–3.5)
LYMPHOCYTES NFR BLD: 33 % (ref 12–49)
MCH RBC QN AUTO: 30.7 PG (ref 26–34)
MCHC RBC AUTO-ENTMCNC: 32.1 G/DL (ref 30–36.5)
MCV RBC AUTO: 95.6 FL (ref 80–99)
MONOCYTES # BLD: 0.5 K/UL (ref 0–1)
MONOCYTES NFR BLD: 9 % (ref 5–13)
NEUTS SEG # BLD: 3.4 K/UL (ref 1.8–8)
NEUTS SEG NFR BLD: 56 % (ref 32–75)
NRBC # BLD: 0 K/UL (ref 0–0.01)
NRBC BLD-RTO: 0 PER 100 WBC
PLATELET # BLD AUTO: 224 K/UL (ref 150–400)
PMV BLD AUTO: 10.6 FL (ref 8.9–12.9)
POTASSIUM SERPL-SCNC: 3.5 MMOL/L (ref 3.5–5.1)
PROT SERPL-MCNC: 8 G/DL (ref 6.4–8.2)
RBC # BLD AUTO: 4.36 M/UL (ref 3.8–5.2)
SARS-COV-2 RDRP RESP QL NAA+PROBE: NOT DETECTED
SODIUM SERPL-SCNC: 140 MMOL/L (ref 136–145)
SOURCE: NORMAL
WBC # BLD AUTO: 6.1 K/UL (ref 3.6–11)

## 2024-02-03 PROCEDURE — 84703 CHORIONIC GONADOTROPIN ASSAY: CPT

## 2024-02-03 PROCEDURE — 2580000003 HC RX 258: Performed by: EMERGENCY MEDICINE

## 2024-02-03 PROCEDURE — 99284 EMERGENCY DEPT VISIT MOD MDM: CPT

## 2024-02-03 PROCEDURE — 87804 INFLUENZA ASSAY W/OPTIC: CPT

## 2024-02-03 PROCEDURE — 36415 COLL VENOUS BLD VENIPUNCTURE: CPT

## 2024-02-03 PROCEDURE — 96360 HYDRATION IV INFUSION INIT: CPT

## 2024-02-03 PROCEDURE — 85025 COMPLETE CBC W/AUTO DIFF WBC: CPT

## 2024-02-03 PROCEDURE — 80053 COMPREHEN METABOLIC PANEL: CPT

## 2024-02-03 PROCEDURE — 87635 SARS-COV-2 COVID-19 AMP PRB: CPT

## 2024-02-03 RX ORDER — PSEUDOEPHEDRINE HCL 120 MG/1
120 TABLET, FILM COATED, EXTENDED RELEASE ORAL EVERY 12 HOURS PRN
Qty: 14 TABLET | Refills: 0 | Status: SHIPPED | OUTPATIENT
Start: 2024-02-03 | End: 2024-02-10

## 2024-02-03 RX ORDER — METHYLPREDNISOLONE 4 MG/1
TABLET ORAL
Qty: 21 TABLET | Refills: 0 | Status: SHIPPED | OUTPATIENT
Start: 2024-02-03 | End: 2024-02-09

## 2024-02-03 RX ORDER — 0.9 % SODIUM CHLORIDE 0.9 %
1000 INTRAVENOUS SOLUTION INTRAVENOUS ONCE
Status: COMPLETED | OUTPATIENT
Start: 2024-02-03 | End: 2024-02-03

## 2024-02-03 RX ADMIN — SODIUM CHLORIDE 1000 ML: 9 INJECTION, SOLUTION INTRAVENOUS at 17:15

## 2024-02-03 ASSESSMENT — PAIN - FUNCTIONAL ASSESSMENT
PAIN_FUNCTIONAL_ASSESSMENT: 0-10
PAIN_FUNCTIONAL_ASSESSMENT: ACTIVITIES ARE NOT PREVENTED

## 2024-02-03 ASSESSMENT — PAIN SCALES - GENERAL: PAINLEVEL_OUTOF10: 2

## 2024-02-03 ASSESSMENT — PAIN DESCRIPTION - DESCRIPTORS: DESCRIPTORS: ACHING

## 2024-02-03 ASSESSMENT — PAIN DESCRIPTION - LOCATION: LOCATION: NECK;HEAD;SHOULDER

## 2024-02-03 NOTE — ED PROVIDER NOTES
[facility] EMERGENCY DEPT  EMERGENCY DEPARTMENT ENCOUNTER       Pt Name: Melia Calderon  MRN: [id number]  Birthdate 1985  Date of evaluation: 2/3/2024  Provider: Pipe Mak MD   PCP: Sarthak Chand III, DO  Note Started: 9:43 PM 2/3/24     CHIEF COMPLAINT       Chief Complaint   Patient presents with    Nausea    Otalgia     Bilateral ear pain, treated for infection in left ear with augmentin and medrol dose pack.     Neck Pain     Reports \"excruciating headache and neck feels stiff\" reports 2 weeks ago she had a rash on her neck which subsided after there steriods    Sore Throat    Dizziness     Report feeling dizzy like she could pass out. Hx of mitral valve prolapse. Believes she may have passed out on Saturday         HISTORY OF PRESENT ILLNESS: 1 or more elements      History From: Patient, History limited by: No limitations     Melia Calderon is a 38 y.o. female who presents with chief complaint of generalized bodyaches, subjective chills, bilateral ear pain.  Symptoms have been ongoing over the past few days, she went to urgent care a few days ago and was prescribed Augmentin and Medrol Dosepak out of concern for left otitis media and effusion.  She has been taking meds as directed but still endorses diffuse sinus pressure and pain located to the eustachian tubes.  Endorses sore throat and lymph node swelling.  Denies any cough, chest pain, shortness of breath.  Today while she was walking up stairs she felt lightheaded and came to the ED for evaluation.     Nursing Notes were all reviewed and agreed with or any disagreements were addressed in the HPI.     REVIEW OF SYSTEMS        Positives and Pertinent negatives as per HPI.    PAST HISTORY     Past Medical History:  Past Medical History:   Diagnosis Date    Abdominal pain, other specified site     Asthma 2018    Calculus of kidney     RIGHT    Fibromyalgia 06/2016    [facility]    GERD (gastroesophageal reflux disease)     Hydronephrosis

## 2024-02-03 NOTE — DISCHARGE INSTRUCTIONS
It was a pleasure taking care of you at [facility] today.  We know that when you come to [facility], you are entrusting us with your health, comfort, and safety.  Our physicians and nurses honor that trust, and we truly appreciate the opportunity to care for you and your loved ones.      We also value your feedback.  If you receive a survey about your Emergency Department experience today, please fill it out.  We care about our patients' feedback, and we listen to what you have to say.  Thank you!

## 2024-02-05 ENCOUNTER — TELEPHONE (OUTPATIENT)
Age: 39
End: 2024-02-05

## 2024-02-05 NOTE — TELEPHONE ENCOUNTER
Emergency Room follow-up appt requested.    Details:  Diagnosis/Symptoms:   Acute sinusitis, recurrence not specified, unspecified location +3 more    Prescribed Meds:  methylPREDNISolone 4 MG Take by mouth.  Pseudoephedrine HCl 120 mg Oral EVERY 12 HOURS FL    Date of Visit:  2/3/23  Facility:   [facility] EMERGENCY DEPT      Patient States:  Only 2 days left of antibiotic.  Referred to ENT and PCP for follow up  Ent cannot see pt until feb 20  Needs appt with pcp    Appointments:  Last seen by pcp:  12/20/2023           Please call pt directly to schedule as soon as able.  [phone]

## 2024-02-07 ENCOUNTER — OFFICE VISIT (OUTPATIENT)
Age: 39
End: 2024-02-07
Payer: COMMERCIAL

## 2024-02-07 VITALS
SYSTOLIC BLOOD PRESSURE: 96 MMHG | HEART RATE: 74 BPM | BODY MASS INDEX: 23.96 KG/M2 | TEMPERATURE: 98.1 F | OXYGEN SATURATION: 100 % | WEIGHT: 143.8 LBS | RESPIRATION RATE: 16 BRPM | HEIGHT: 65 IN | DIASTOLIC BLOOD PRESSURE: 66 MMHG

## 2024-02-07 DIAGNOSIS — K90.41 NON-CELIAC GLUTEN SENSITIVITY: ICD-10-CM

## 2024-02-07 DIAGNOSIS — E03.8 SUBCLINICAL HYPOTHYROIDISM: ICD-10-CM

## 2024-02-07 DIAGNOSIS — J01.00 SUBACUTE MAXILLARY SINUSITIS: Primary | ICD-10-CM

## 2024-02-07 DIAGNOSIS — Z09 HOSPITAL DISCHARGE FOLLOW-UP: ICD-10-CM

## 2024-02-07 DIAGNOSIS — M79.7 FIBROMYALGIA: ICD-10-CM

## 2024-02-07 PROCEDURE — G8427 DOCREV CUR MEDS BY ELIG CLIN: HCPCS | Performed by: INTERNAL MEDICINE

## 2024-02-07 PROCEDURE — 1036F TOBACCO NON-USER: CPT | Performed by: INTERNAL MEDICINE

## 2024-02-07 PROCEDURE — 99213 OFFICE O/P EST LOW 20 MIN: CPT | Performed by: INTERNAL MEDICINE

## 2024-02-07 PROCEDURE — G8420 CALC BMI NORM PARAMETERS: HCPCS | Performed by: INTERNAL MEDICINE

## 2024-02-07 PROCEDURE — G8484 FLU IMMUNIZE NO ADMIN: HCPCS | Performed by: INTERNAL MEDICINE

## 2024-02-07 PROCEDURE — 1111F DSCHRG MED/CURRENT MED MERGE: CPT | Performed by: INTERNAL MEDICINE

## 2024-02-07 RX ORDER — CYCLOBENZAPRINE HCL 10 MG
10 TABLET ORAL 3 TIMES DAILY PRN
Qty: 90 TABLET | Refills: 1 | Status: SHIPPED | OUTPATIENT
Start: 2024-02-07

## 2024-02-07 RX ORDER — PREDNISONE 20 MG/1
TABLET ORAL
Qty: 18 TABLET | Refills: 0 | Status: SHIPPED | OUTPATIENT
Start: 2024-02-07

## 2024-02-07 ASSESSMENT — PATIENT HEALTH QUESTIONNAIRE - PHQ9
1. LITTLE INTEREST OR PLEASURE IN DOING THINGS: 0
SUM OF ALL RESPONSES TO PHQ QUESTIONS 1-9: 0
2. FEELING DOWN, DEPRESSED OR HOPELESS: 0
SUM OF ALL RESPONSES TO PHQ QUESTIONS 1-9: 0
SUM OF ALL RESPONSES TO PHQ9 QUESTIONS 1 & 2: 0
SUM OF ALL RESPONSES TO PHQ QUESTIONS 1-9: 0
SUM OF ALL RESPONSES TO PHQ QUESTIONS 1-9: 0

## 2024-02-07 NOTE — PROGRESS NOTES
\"Have you been to the ER, urgent care clinic since your last visit?  Hospitalized since your last visit?\"    Yes -Urgent [facility] 01/28/2024 [facility] ear infection   [facility]     “Have you seen or consulted any other health care providers outside of [facility] since your last visit?”    NO

## 2024-02-07 NOTE — PROGRESS NOTES
Melia Calderon is a 38 y.o. female who presents for evaluation of ED follow up.   Last seen by me dec 20, 2023 in [facility].  Came down with sinus and OM end of jan.  [facility], and was given augmentin and steroids.  Improved slightly, then had some vertigo symptoms, and went to ED on feb 3.   Was given another course of steroids.  Feeling better, but not back to 100% yet.  Wanted me to evaluate her.  Denies f/c.  Doing well in NP program.      ROS:  Constitutional: negative for fevers, chills, anorexia and weight loss  Eyes:   negative for visual disturbance and irritation  ENT:   negative for tinnitus,sore throat,nasal congestion,ear pain,hoarseness  Respiratory:  negative for cough, hemoptysis, dyspnea,wheezing  CV:   negative for chest pain, palpitations, lower extremity edema  GI:   negative for nausea, vomiting, diarrhea, abdominal pain,melena  Genitourinary: negative for frequency, dysuria and hematuria  Musculoskel: negative for myalgias, arthralgias, back pain, muscle weakness, joint pain  Neurological:  negative for headaches, dizziness, focal weakness, numbness  Psychiatric:     Negative for depression or anxiety      Past Medical History:   Diagnosis Date    Abdominal pain, other specified site     Asthma 2018    Calculus of kidney     RIGHT    Fibromyalgia 06/2016    [facility]    GERD (gastroesophageal reflux disease)     Hydronephrosis     Hypoglycemia, unspecified     Irregular menstrual cycle     Lumbago     Lupus ([facility]) 06/2016    [facility]    Migraine with aura, without mention of intractable migraine without mention of status migrainosus     Other ill-defined conditions(799.89)     mitral valve prolapse    Sacroiliitis, not elsewhere classified (HCC)     Thyroid disease        Past Surgical History:   Procedure Laterality Date    ADENOIDECTOMY      COLONOSCOPY FLX DX W/COLLJ SPEC WHEN PFRMD  4/7/2014         EGD TRANSORAL BIOPSY SINGLE/MULTIPLE

## 2024-05-11 ENCOUNTER — OFFICE VISIT (OUTPATIENT)
Age: 39
End: 2024-05-11

## 2024-05-11 VITALS
OXYGEN SATURATION: 96 % | HEIGHT: 65 IN | BODY MASS INDEX: 23.99 KG/M2 | DIASTOLIC BLOOD PRESSURE: 77 MMHG | HEART RATE: 93 BPM | RESPIRATION RATE: 16 BRPM | SYSTOLIC BLOOD PRESSURE: 120 MMHG | WEIGHT: 144 LBS | TEMPERATURE: 99.2 F

## 2024-05-11 DIAGNOSIS — J45.21 MILD INTERMITTENT ASTHMA WITH EXACERBATION: ICD-10-CM

## 2024-05-11 DIAGNOSIS — J20.8 ACUTE BACTERIAL BRONCHITIS: Primary | ICD-10-CM

## 2024-05-11 DIAGNOSIS — J00 ACUTE NASOPHARYNGITIS: ICD-10-CM

## 2024-05-11 DIAGNOSIS — B96.89 ACUTE BACTERIAL BRONCHITIS: Primary | ICD-10-CM

## 2024-05-11 LAB
Lab: NORMAL
PERFORMING INSTRUMENT: NORMAL
QC PASS/FAIL: NORMAL
QUICKVUE INFLUENZA TEST: NEGATIVE
RSV, POC: NEGATIVE
SARS-COV-2, POC: NORMAL
VALID INTERNAL CONTROL, POC: NORMAL
VALID INTERNAL CONTROL: NORMAL

## 2024-05-11 RX ORDER — AZITHROMYCIN 250 MG/1
TABLET, FILM COATED ORAL
Qty: 6 TABLET | Refills: 0 | Status: SHIPPED | OUTPATIENT
Start: 2024-05-11 | End: 2024-05-21

## 2024-05-11 RX ORDER — FLUCONAZOLE 150 MG/1
TABLET ORAL
Qty: 2 TABLET | Refills: 0 | Status: SHIPPED | OUTPATIENT
Start: 2024-05-11

## 2024-05-11 RX ORDER — METHYLPREDNISOLONE 4 MG/1
TABLET ORAL
Qty: 1 KIT | Refills: 0 | Status: SHIPPED | OUTPATIENT
Start: 2024-05-11 | End: 2024-05-17

## 2024-05-11 RX ORDER — BENZONATATE 200 MG/1
200 CAPSULE ORAL 3 TIMES DAILY PRN
Qty: 30 CAPSULE | Refills: 0 | Status: SHIPPED | OUTPATIENT
Start: 2024-05-11 | End: 2024-05-21

## 2024-05-11 NOTE — PROGRESS NOTES
(ZITHROMAX) 250 MG tablet; 500mg on day 1 followed by 250mg on days 2 - 5, Disp-6 tablet, R-0Normal  -     fluconazole (DIFLUCAN) 150 MG tablet; Take 1 tablet by mouth repeat another dose in 72 hrs, Disp-2 tablet, R-0Normal  2. Mild intermittent asthma with exacerbation  -     methylPREDNISolone (MEDROL, LENORE,) 4 MG tablet; Take by mouth., Disp-1 kit, R-0Normal  -     benzonatate (TESSALON) 200 MG capsule; Take 1 capsule by mouth 3 times daily as needed for Cough, Disp-30 capsule, R-0Normal  3. Acute nasopharyngitis  -     benzonatate (TESSALON) 200 MG capsule; Take 1 capsule by mouth 3 times daily as needed for Cough, Disp-30 capsule, R-0Normal  -     AMB POC RAPID INFLUENZA TEST  -     POCT COVID-19, Antigen  -     AMB POC RSV       - Take tylenol or motrin prn for fever.   - Use humidifier  - Use flonase & saline nasal spray.          Test Results:    Results for orders placed or performed in visit on 05/11/24   AMB POC RAPID INFLUENZA TEST   Result Value Ref Range    Valid Internal Control, POC PASS     QuickVue Influenza test Negative    POCT COVID-19, Antigen   Result Value Ref Range    SARS-COV-2, POC Not-Detected Not Detected    Lot Number [id number]     QC Pass/Fail PASS     Performing Instrument BinaxNOW    AMB POC RSV   Result Value Ref Range    Internal Control PASS     RSV, POC Negative        Follow up:  Follow up immediately for any new, worsening or changes or if symptoms are not improving over the next 5-7 days.         TABITHA Woodard - CNP

## 2024-06-02 ENCOUNTER — NURSE TRIAGE (OUTPATIENT)
Dept: OTHER | Facility: CLINIC | Age: 39
End: 2024-06-02

## 2024-06-02 PROCEDURE — 99284 EMERGENCY DEPT VISIT MOD MDM: CPT

## 2024-06-03 ENCOUNTER — HOSPITAL ENCOUNTER (EMERGENCY)
Facility: HOSPITAL | Age: 39
Discharge: HOME | End: 2024-06-03
Payer: COMMERCIAL

## 2024-06-03 VITALS
TEMPERATURE: 98.2 F | HEART RATE: 91 BPM | WEIGHT: 147.93 LBS | OXYGEN SATURATION: 98 % | SYSTOLIC BLOOD PRESSURE: 105 MMHG | RESPIRATION RATE: 16 BRPM | BODY MASS INDEX: 23.77 KG/M2 | HEIGHT: 66 IN | DIASTOLIC BLOOD PRESSURE: 65 MMHG

## 2024-06-03 DIAGNOSIS — Z23 NEED FOR PROPHYLACTIC VACCINATION AGAINST DIPHTHERIA AND TETANUS: ICD-10-CM
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
## 2024-06-03 PROCEDURE — 90715 TDAP VACCINE 7 YRS/> IM: CPT | Performed by: PHYSICIAN ASSISTANT

## 2024-06-03 PROCEDURE — 90471 IMMUNIZATION ADMIN: CPT | Performed by: PHYSICIAN ASSISTANT

## 2024-06-03 PROCEDURE — 6360000002 HC RX W HCPCS: Performed by: PHYSICIAN ASSISTANT

## 2024-06-03 PROCEDURE — 6370000000 HC RX 637 (ALT 250 FOR IP): Performed by: PHYSICIAN ASSISTANT

## 2024-06-03 RX ORDER — FLUCONAZOLE 150 MG/1
150 TABLET ORAL ONCE
Qty: 2 TABLET | Refills: 0 | Status: SHIPPED | OUTPATIENT
Start: 2024-06-03 | End: 2024-06-03

## 2024-06-03 RX ORDER — AMOXICILLIN AND CLAVULANATE POTASSIUM 875; 125 MG/1; MG/1
1 TABLET, FILM COATED ORAL
Status: COMPLETED | OUTPATIENT
Start: 2024-06-03 | End: 2024-06-03

## 2024-06-03 RX ORDER — AMOXICILLIN AND CLAVULANATE POTASSIUM 875; 125 MG/1; MG/1
1 TABLET, FILM COATED ORAL 2 TIMES DAILY
Qty: 14 TABLET | Refills: 0 | Status: SHIPPED | OUTPATIENT
Start: 2024-06-03 | End: 2024-06-10

## 2024-06-03 RX ORDER — AMOXICILLIN AND CLAVULANATE POTASSIUM 875; 125 MG/1; MG/1
1 TABLET, FILM COATED ORAL 2 TIMES DAILY
Qty: 14 TABLET | Refills: 0 | Status: SHIPPED | OUTPATIENT
Start: 2024-06-03 | End: 2024-06-03

## 2024-06-03 RX ADMIN — AMOXICILLIN AND CLAVULANATE POTASSIUM 1 TABLET: 875; 125 TABLET, FILM COATED ORAL at 00:36

## 2024-06-03 RX ADMIN — TETANUS TOXOID, REDUCED DIPHTHERIA TOXOID AND ACELLULAR PERTUSSIS VACCINE, ADSORBED 0.5 ML: 5; 2.5; 8; 8; 2.5 SUSPENSION INTRAMUSCULAR at 00:36

## 2024-06-03 ASSESSMENT — LIFESTYLE VARIABLES
HOW MANY STANDARD DRINKS CONTAINING ALCOHOL DO YOU HAVE ON A TYPICAL DAY: PATIENT DOES NOT DRINK
HOW OFTEN DO YOU HAVE A DRINK CONTAINING ALCOHOL: NEVER

## 2024-06-03 ASSESSMENT — VISUAL ACUITY: OU: 1

## 2024-06-03 ASSESSMENT — PAIN SCALES - GENERAL: PAINLEVEL_OUTOF10: 3

## 2024-06-03 ASSESSMENT — PAIN DESCRIPTION - ORIENTATION: ORIENTATION: RIGHT;LOWER

## 2024-06-03 ASSESSMENT — PAIN DESCRIPTION - LOCATION: LOCATION: FACE

## 2024-06-03 ASSESSMENT — PAIN DESCRIPTION - DESCRIPTORS: DESCRIPTORS: PRESSURE

## 2024-06-03 ASSESSMENT — PAIN - FUNCTIONAL ASSESSMENT: PAIN_FUNCTIONAL_ASSESSMENT: 0-10

## 2024-06-03 NOTE — ED NOTES
Discharge instructions reviewed at this time. Patient verbalized understanding. Patient A&Ox4 and ambulatory at this time. Patient instructed on follow ups, medications and education and verbalized understanding. Patient discharge complete.

## 2024-06-03 NOTE — ED PROVIDER NOTES
12:46:35 AM    Discharge Note: The patient is stable for discharge home. The signs, symptoms, diagnosis, and discharge instructions have been discussed, understanding conveyed, and agreed upon. The patient is to follow up as recommended or return to ER should their symptoms worsen.      PATIENT REFERRED TO:  Sarthak Chand III, DO  [address]  [place] [address]  [phone]    Call in 2 days  PRIMARY CARE: follow up in 2 days for a wound check if there is any concern       DISCHARGE MEDICATIONS:     Medication List        START taking these medications      amoxicillin-clavulanate 875-125 MG per tablet  Commonly known as: AUGMENTIN  Take 1 tablet by mouth 2 times daily for 7 days            CHANGE how you take these medications      * fluconazole 150 MG tablet  Commonly known as: DIFLUCAN  Take 1 tablet by mouth repeat another dose in 72 hrs  What changed: Another medication with the same name was added. Make sure you understand how and when to take each.     * fluconazole 150 MG tablet  Commonly known as: DIFLUCAN  Take 1 tablet by mouth once for 1 dose May repeat in 7 days if symptoms persist  What changed: You were already taking a medication with the same name, and this prescription was added. Make sure you understand how and when to take each.           * This list has 2 medication(s) that are the same as other medications prescribed for you. Read the directions carefully, and ask your doctor or other care provider to review them with you.                ASK your doctor about these medications      * albuterol sulfate  (90 Base) MCG/ACT inhaler  Commonly known as: PROVENTIL;VENTOLIN;PROAIR     * albuterol (2.5 MG/3ML) 0.083% nebulizer solution  Commonly known as: PROVENTIL     B COMPLEX 1 PO     cetirizine 10 MG tablet  Commonly known as: ZYRTEC     cyclobenzaprine 10 MG tablet  Commonly known as: FLEXERIL  Take 1 tablet by mouth 3 times daily as needed for

## 2024-06-04 ENCOUNTER — CARE COORDINATION (OUTPATIENT)
Dept: OTHER | Facility: CLINIC | Age: 39
End: 2024-06-04

## 2024-06-04 NOTE — CARE COORDINATION
Ambulatory Care Manager (ACM) contacted the patient to introduce the Associate Care Management Program related to cat bite - ED visit on 6/3/24. ACM reviewed and updated CC Protocol  and medication  patient was agreeable to follow up Care Management calls.      Summary Note: Patient states she continues to have fever, no worsening symptoms.  Feels achy in neck and shoulder, numbness in her face.  Called Nurse Access line, was advised to go to the ED with in three hours of being bit.  Patient states she still has a fever. Encouraged her to reach out to her PCP to let him know.  She does have a follow up visit scheduled for tomorrow, 6/5/24.    Provided Education:  Discussed red flags and appropriate site of care based on symptoms and resources available to patient including: PCP  Urgent care clinics  When to call 911.   Provided the following associate/dependent related resources: [facility]- #[phone] or [phone], [email or web address]  [facility]: [phone] (Questions-0, Refills-1)    Nurse Access line 24/7 # [phone]    Importance and benefits of: Follow up with PCP and specialist, medication adherence, self monitoring and reporting of symptoms.      Plan:  ACM provided contact information for future needs. Plan for follow-up call in 1-2 days based on severity of symptoms and risk factors.  Plan for next call: Review and update: CC Protocol  and medication     Follow up on: symptom management-pain, fever  Patient  verbalized understanding and is agreeable to follow up call.

## 2024-06-05 ENCOUNTER — OFFICE VISIT (OUTPATIENT)
Age: 39
End: 2024-06-05
Payer: COMMERCIAL

## 2024-06-05 ENCOUNTER — CARE COORDINATION (OUTPATIENT)
Dept: OTHER | Facility: CLINIC | Age: 39
End: 2024-06-05

## 2024-06-05 VITALS
TEMPERATURE: 98.1 F | HEIGHT: 65 IN | WEIGHT: 144.8 LBS | HEART RATE: 81 BPM | DIASTOLIC BLOOD PRESSURE: 71 MMHG | OXYGEN SATURATION: 97 % | RESPIRATION RATE: 14 BRPM | BODY MASS INDEX: 24.12 KG/M2 | SYSTOLIC BLOOD PRESSURE: 103 MMHG
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
## 2024-06-05 DIAGNOSIS — K90.41 NON-CELIAC GLUTEN SENSITIVITY: ICD-10-CM

## 2024-06-05 DIAGNOSIS — M79.7 FIBROMYALGIA: ICD-10-CM

## 2024-06-05 DIAGNOSIS — E03.8 SUBCLINICAL HYPOTHYROIDISM: ICD-10-CM

## 2024-06-05 PROCEDURE — 1036F TOBACCO NON-USER: CPT | Performed by: INTERNAL MEDICINE

## 2024-06-05 PROCEDURE — 99213 OFFICE O/P EST LOW 20 MIN: CPT | Performed by: INTERNAL MEDICINE

## 2024-06-05 PROCEDURE — G8420 CALC BMI NORM PARAMETERS: HCPCS | Performed by: INTERNAL MEDICINE

## 2024-06-05 PROCEDURE — G8427 DOCREV CUR MEDS BY ELIG CLIN: HCPCS | Performed by: INTERNAL MEDICINE

## 2024-06-05 NOTE — CARE COORDINATION
Care Transitions Note    Follow Up Call      Attempted to reach patient for transitions of care follow up.  Unable to reach patient.      Outreach Attempts:   HIPAA compliant voicemail left for patient.     Care Summary Note: Left message with contact information.    Follow Up Appointment:   Future Appointments         Provider Specialty Dept Phone    12/24/2024 3:40 PM Sarthak Chand III,  Internal Medicine [phone]            Plan for follow-up call in 2-5 days based on severity of symptoms and risk factors. Plan for next call: symptom management-improvements?    Sylwia Correa RN

## 2024-06-05 NOTE — PROGRESS NOTES
\"Have you been to the ER, urgent care clinic since your last visit?  Hospitalized since your last visit?\"    YES - When: approximately 3 days ago.  Where and Why: [facility] ED-Animal Bite.    “Have you seen or consulted any other health care providers outside of [facility] since your last visit?”    NO            Click Here for Release of Records Request

## 2024-06-05 NOTE — PROGRESS NOTES
Melia Calderon is a 39 y.o. female who presents for evaluation of ED follow up.  Last seen by me 2024.  She got bit by her house cat on  evening about 22:40, and went to ED later that night.  Was given tdap booster shot and d/c to home with augmentin.  Swelling, erythema, and tenderness all much improved.      ROS:  Constitutional: negative for fevers, chills, anorexia and weight loss  Eyes:   negative for visual disturbance and irritation  ENT:   negative for tinnitus,sore throat,nasal congestion,ear pain,hoarseness  Respiratory:  negative for cough, hemoptysis, dyspnea,wheezing  CV:   negative for chest pain, palpitations, lower extremity edema  GI:   negative for nausea, vomiting, diarrhea, abdominal pain,melena  Genitourinary: negative for frequency, dysuria and hematuria  Musculoskel: negative for myalgias, arthralgias, back pain, muscle weakness, joint pain  Neurological:  negative for headaches, dizziness, focal weakness, numbness  Psychiatric:     Negative for depression or anxiety      Past Medical History:   Diagnosis Date    Abdominal pain, other specified site     Asthma 2018    Calculus of kidney     RIGHT    Fibromyalgia 2016    [facility]    GERD (gastroesophageal reflux disease)     Hydronephrosis     Hypoglycemia, unspecified     Irregular menstrual cycle     Lumbago     Lupus (HCC) 2016    [facility]    Migraine with aura, without mention of intractable migraine without mention of status migrainosus     Other ill-defined conditions(799.89)     mitral valve prolapse    Sacroiliitis, not elsewhere classified (HCC)     Thyroid disease        Past Surgical History:   Procedure Laterality Date    ADENOIDECTOMY      COLONOSCOPY FLX DX W/COLLJ SPEC WHEN PFRMD  2014         EGD TRANSORAL BIOPSY SINGLE/MULTIPLE  2014         TONSILLECTOMY         Family History   Problem Relation Age of Onset    Heart Disease Maternal Grandfather         /heart attack

## 2024-06-12 ENCOUNTER — CARE COORDINATION (OUTPATIENT)
Dept: OTHER | Facility: CLINIC | Age: 39
End: 2024-06-12

## 2024-06-12 NOTE — CARE COORDINATION
Ambulatory Care Coordination Note     2024 2:42 PM     Patient Current Location:  [facility] contacted the patient by telephone. Verified name and  with patient as identifiers.         ACM: Sylwia Correa RN     Challenges to be reviewed by the provider   Additional needs identified to be addressed with provider No  none               Method of communication with provider: none.    Care Summary Note: Patient states she is doing much better, has completed round of antibiotics, still taking diflucan.  She found out that her cat had two molars that were rotted and that he wasn't passing urine correctly, so that is why he bit her.  Cat is currently in the hospital, she is grateful to know why he acted out.    Offered patient enrollment in the Remote Patient Monitoring (RPM) program for in-home monitoring: Patient is not eligible for RPM program because: insurance coverage.     Assessments Completed:   No changes since last call    Medications Reviewed:   Completed during a previous call     Advance Care Planning:   Not reviewed during this call     Care Planning:   Not completed during this call    PCP/Specialist follow up:   Future Appointments         Provider Specialty Dept Phone    2024 3:40 PM Sarthak Chand III,  Internal Medicine [phone]            Follow Up:   Plan for next ACM outreach in approximately 3 weeks to complete:  - check in on patient and her cat .   patient  is agreeable to this plan.

## 2024-07-03 ENCOUNTER — CARE COORDINATION (OUTPATIENT)
Dept: OTHER | Facility: CLINIC | Age: 39
End: 2024-07-03

## 2024-07-03 NOTE — CARE COORDINATION
Ambulatory Care Coordination Note     7/3/2024 11:56 AM     Patient Current Location:  Home:  [address]  [facility] [address]     Patient graduated from the High Risk Care Management program on 7/3/2024.  Patient verbalizes confidence in the ability to self-manage at this time..  Care management goals have been completed. No further Ambulatory Care Manager follow up scheduled.      ACM: Sylwia Correa RN     Challenges to be reviewed by the provider   Additional needs identified to be addressed with provider No  none               Method of communication with provider: none.    Care Summary Note: Patient states she is doing fine.  Her cat has been in and out of the hospital due to teeth being pulled and getting an infection.  She got an antibiotic and he is doing better now.  No further needs.  Patient appreciative.    Offered patient enrollment in the Remote Patient Monitoring (RPM) program for in-home monitoring: Patient is not eligible for RPM program because: insurance coverage.     Assessments Completed:   No changes since last call    Medications Reviewed:   Completed during a previous call     Advance Care Planning:   Not reviewed during this call     Care Planning:   Not completed during this call    PCP/Specialist follow up:   Future Appointments         Provider Specialty Dept Phone    12/24/2024 3:40 PM Sarthak Chand III, DO Internal Medicine [phone]            Follow Up:   No further Ambulatory Care Management follow-up scheduled at this time.  patient  has Ambulatory Care Manager's contact information for any further questions, concerns or needs.

## 2024-07-30 DIAGNOSIS — Z23 IMMUNIZATION DUE: Primary | ICD-10-CM

## 2024-08-27 ENCOUNTER — TELEPHONE (OUTPATIENT)
Age: 39
End: 2024-08-27

## 2024-08-27 ENCOUNTER — LAB (OUTPATIENT)
Age: 39
End: 2024-08-27

## 2024-08-27 DIAGNOSIS — Z11.1 SCREENING FOR TUBERCULOSIS: ICD-10-CM

## 2024-08-27 DIAGNOSIS — Z23 IMMUNIZATION DUE: ICD-10-CM

## 2024-08-27 NOTE — TELEPHONE ENCOUNTER
Patient presents to the office to drop off [facility] preceptor agreement form for Sarthak Chand III, . Patient advised of potential 10-14 business day turnaround time for form completion & may incur a fee of $25.00. This has been fully explained to the patient, who indicates understanding.

## 2024-09-01 LAB
M TB IFN-G BLD-IMP: NEGATIVE
M TB IFN-G CD4+ T-CELLS BLD-ACNC: 0.08 IU/ML
M TBIFN-G CD4+ CD8+T-CELLS BLD-ACNC: 0.02 IU/ML
QUANTIFERON CRITERIA: NORMAL
QUANTIFERON MITOGEN VALUE: >10 IU/ML
QUANTIFERON NIL VALUE: 0.03 IU/ML

## 2024-12-24 ENCOUNTER — OFFICE VISIT (OUTPATIENT)
Age: 39
End: 2024-12-24
Payer: COMMERCIAL

## 2024-12-24 VITALS
BODY MASS INDEX: 23.16 KG/M2 | HEART RATE: 78 BPM | TEMPERATURE: 97.1 F | OXYGEN SATURATION: 98 % | DIASTOLIC BLOOD PRESSURE: 71 MMHG | RESPIRATION RATE: 14 BRPM | WEIGHT: 139 LBS | HEIGHT: 65 IN | SYSTOLIC BLOOD PRESSURE: 107 MMHG

## 2024-12-24 DIAGNOSIS — Z00.00 ANNUAL PHYSICAL EXAM: Primary | ICD-10-CM

## 2024-12-24 DIAGNOSIS — E03.8 SUBCLINICAL HYPOTHYROIDISM: ICD-10-CM

## 2024-12-24 DIAGNOSIS — K90.41 NON-CELIAC GLUTEN SENSITIVITY: ICD-10-CM

## 2024-12-24 DIAGNOSIS — R53.83 OTHER FATIGUE: ICD-10-CM

## 2024-12-24 DIAGNOSIS — M79.7 FIBROMYALGIA: ICD-10-CM

## 2024-12-24 DIAGNOSIS — R73.03 PREDIABETES: ICD-10-CM

## 2024-12-24 DIAGNOSIS — E78.2 MIXED HYPERLIPIDEMIA: ICD-10-CM

## 2024-12-24 PROCEDURE — 99395 PREV VISIT EST AGE 18-39: CPT | Performed by: INTERNAL MEDICINE

## 2024-12-24 RX ORDER — EPINEPHRINE 0.3 MG/.3ML
0.3 INJECTION SUBCUTANEOUS PRN
Qty: 2 EACH | Refills: 0 | Status: SHIPPED | OUTPATIENT
Start: 2024-12-24

## 2024-12-24 RX ORDER — FAMOTIDINE 20 MG/1
20 TABLET, FILM COATED ORAL 2 TIMES DAILY
COMMUNITY
End: 2024-12-24 | Stop reason: SDUPTHER

## 2024-12-24 RX ORDER — ALBUTEROL SULFATE 90 UG/1
2 INHALANT RESPIRATORY (INHALATION) 4 TIMES DAILY PRN
Qty: 18 G | Refills: 1 | Status: SHIPPED | OUTPATIENT
Start: 2024-12-24

## 2024-12-24 RX ORDER — FAMOTIDINE 20 MG/1
20 TABLET, FILM COATED ORAL 2 TIMES DAILY
Qty: 60 TABLET | Refills: 1 | Status: SHIPPED | OUTPATIENT
Start: 2024-12-24

## 2024-12-24 RX ORDER — CYCLOBENZAPRINE HCL 10 MG
10 TABLET ORAL 3 TIMES DAILY PRN
Qty: 90 TABLET | Refills: 1 | Status: SHIPPED | OUTPATIENT
Start: 2024-12-24

## 2024-12-24 SDOH — ECONOMIC STABILITY: FOOD INSECURITY: WITHIN THE PAST 12 MONTHS, YOU WORRIED THAT YOUR FOOD WOULD RUN OUT BEFORE YOU GOT MONEY TO BUY MORE.: NEVER TRUE

## 2024-12-24 SDOH — ECONOMIC STABILITY: FOOD INSECURITY: WITHIN THE PAST 12 MONTHS, THE FOOD YOU BOUGHT JUST DIDN'T LAST AND YOU DIDN'T HAVE MONEY TO GET MORE.: NEVER TRUE

## 2024-12-24 SDOH — ECONOMIC STABILITY: INCOME INSECURITY: HOW HARD IS IT FOR YOU TO PAY FOR THE VERY BASICS LIKE FOOD, HOUSING, MEDICAL CARE, AND HEATING?: NOT HARD AT ALL

## 2024-12-24 NOTE — PROGRESS NOTES
Melia Calderon is a 39 y.o. female who presents for evaluation of annual cpe.  Last seen by me June 5, 2024 in ed follow up.  Overall she has done quite well, though of late is struggling with increased fatigue, as well as hair loss and cold intolerance.  She only has 2 more months left in her program thru [employer], which she has been doing FT now for 18 months.  She is also still working FT at [employer], and recently got a promotion.        ROS:  Constitutional: negative for fevers, chills, anorexia and weight loss  Eyes:   negative for visual disturbance and irritation  ENT:   negative for tinnitus,sore throat,nasal congestion,ear pain,hoarseness  Respiratory:  negative for cough, hemoptysis, dyspnea,wheezing  CV:   negative for chest pain, palpitations, lower extremity edema  GI:   negative for nausea, vomiting, diarrhea, abdominal pain,melena  Genitourinary: negative for frequency, dysuria and hematuria  Musculoskel: negative for myalgias, arthralgias, back pain, muscle weakness, joint pain  Neurological:  negative for headaches, dizziness, focal weakness, numbness  Psychiatric:     Negative for depression or anxiety      Past Medical History:   Diagnosis Date    Abdominal pain, other specified site     Asthma 2018    Calculus of kidney     RIGHT    Fibromyalgia 06/2016    [facility]    GERD (gastroesophageal reflux disease)     Hydronephrosis     Hypoglycemia, unspecified     Irregular menstrual cycle     Lumbago     Lupus 06/2016    [facility]    Migraine with aura, without mention of intractable migraine without mention of status migrainosus     Other ill-defined conditions(799.89)     mitral valve prolapse    Sacroiliitis, not elsewhere classified (HCC)     Thyroid disease        Past Surgical History:   Procedure Laterality Date    ADENOIDECTOMY      COLONOSCOPY FLX DX W/COLLJ SPEC WHEN PFRMD  4/7/2014         EGD TRANSORAL BIOPSY SINGLE/MULTIPLE  4/7/2014         TONSILLECTOMY

## 2024-12-25 LAB
25(OH)D3 SERPL-MCNC: 74.8 NG/ML (ref 30–100)
ALBUMIN SERPL-MCNC: 4.4 G/DL (ref 3.5–5)
ALBUMIN/GLOB SERPL: 1.2 (ref 1.1–2.2)
ALP SERPL-CCNC: 121 U/L (ref 45–117)
ALT SERPL-CCNC: 19 U/L (ref 12–78)
ANION GAP SERPL CALC-SCNC: 6 MMOL/L (ref 2–12)
AST SERPL-CCNC: 13 U/L (ref 15–37)
BASOPHILS # BLD: 0 K/UL (ref 0–0.1)
BASOPHILS NFR BLD: 1 % (ref 0–1)
BILIRUB SERPL-MCNC: 0.4 MG/DL (ref 0.2–1)
BUN SERPL-MCNC: 9 MG/DL (ref 6–20)
BUN/CREAT SERPL: 13 (ref 12–20)
CALCIUM SERPL-MCNC: 9.3 MG/DL (ref 8.5–10.1)
CHLORIDE SERPL-SCNC: 105 MMOL/L (ref 97–108)
CHOLEST SERPL-MCNC: 199 MG/DL
CO2 SERPL-SCNC: 26 MMOL/L (ref 21–32)
CREAT SERPL-MCNC: 0.67 MG/DL (ref 0.55–1.02)
DIFFERENTIAL METHOD BLD: NORMAL
EOSINOPHIL # BLD: 0.1 K/UL (ref 0–0.4)
EOSINOPHIL NFR BLD: 2 % (ref 0–7)
ERYTHROCYTE [DISTWIDTH] IN BLOOD BY AUTOMATED COUNT: 12.7 % (ref 11.5–14.5)
EST. AVERAGE GLUCOSE BLD GHB EST-MCNC: 108 MG/DL
GLOBULIN SER CALC-MCNC: 3.8 G/DL (ref 2–4)
GLUCOSE SERPL-MCNC: 90 MG/DL (ref 65–100)
HBA1C MFR BLD: 5.4 % (ref 4–5.6)
HCT VFR BLD AUTO: 43.5 % (ref 35–47)
HDLC SERPL-MCNC: 83 MG/DL
HDLC SERPL: 2.4 (ref 0–5)
HGB BLD-MCNC: 14.2 G/DL (ref 11.5–16)
IMM GRANULOCYTES # BLD AUTO: 0 K/UL (ref 0–0.04)
IMM GRANULOCYTES NFR BLD AUTO: 0 % (ref 0–0.5)
LDLC SERPL CALC-MCNC: 101.4 MG/DL (ref 0–100)
LYMPHOCYTES # BLD: 1.7 K/UL (ref 0.8–3.5)
LYMPHOCYTES NFR BLD: 27 % (ref 12–49)
MCH RBC QN AUTO: 31.1 PG (ref 26–34)
MCHC RBC AUTO-ENTMCNC: 32.6 G/DL (ref 30–36.5)
MCV RBC AUTO: 95.2 FL (ref 80–99)
MONOCYTES # BLD: 0.6 K/UL (ref 0–1)
MONOCYTES NFR BLD: 9 % (ref 5–13)
NEUTS SEG # BLD: 3.8 K/UL (ref 1.8–8)
NEUTS SEG NFR BLD: 61 % (ref 32–75)
NRBC # BLD: 0 K/UL (ref 0–0.01)
NRBC BLD-RTO: 0 PER 100 WBC
PLATELET # BLD AUTO: 225 K/UL (ref 150–400)
PMV BLD AUTO: 11.6 FL (ref 8.9–12.9)
POTASSIUM SERPL-SCNC: 3.9 MMOL/L (ref 3.5–5.1)
PROT SERPL-MCNC: 8.2 G/DL (ref 6.4–8.2)
RBC # BLD AUTO: 4.57 M/UL (ref 3.8–5.2)
SODIUM SERPL-SCNC: 137 MMOL/L (ref 136–145)
T3 SERPL-MCNC: 107 NG/DL (ref 71–180)
T3FREE SERPL-MCNC: 2.8 PG/ML (ref 2–4.4)
T4 FREE SERPL-MCNC: 0.8 NG/DL (ref 0.8–1.5)
TRIGL SERPL-MCNC: 73 MG/DL
TSH SERPL DL<=0.05 MIU/L-ACNC: 1.3 UIU/ML (ref 0.36–3.74)
VIT B12 SERPL-MCNC: 549 PG/ML (ref 193–986)
VLDLC SERPL CALC-MCNC: 14.6 MG/DL
WBC # BLD AUTO: 6.2 K/UL (ref 3.6–11)

## 2025-01-03 ENCOUNTER — TELEPHONE (OUTPATIENT)
Age: 40
End: 2025-01-03

## 2025-01-03 NOTE — TELEPHONE ENCOUNTER
Called/Spoke with pt     Notified of pcp response,     Pt wanted to visit [facility]-soonest in office was 1/10/25     Both parties verbalized understanding.

## 2025-01-03 NOTE — TELEPHONE ENCOUNTER
Pt states scratched leg on 12/30/24 states been using neosporin. Pt now experiencing swelling since 01/01, white discharge, redness around the scratch and pain when standing. Pt not sure what to put on scratch states home remedies not working.     Confirmed [facility] in [place].     Please call to advise.

## 2025-01-10 ENCOUNTER — TELEMEDICINE (OUTPATIENT)
Age: 40
End: 2025-01-10
Payer: COMMERCIAL

## 2025-01-10 DIAGNOSIS — L03.116 CELLULITIS OF LEFT LOWER EXTREMITY: Primary | ICD-10-CM

## 2025-01-10 DIAGNOSIS — L95.9 VASCULITIS LIMITED TO SKIN: ICD-10-CM

## 2025-01-10 PROCEDURE — 99213 OFFICE O/P EST LOW 20 MIN: CPT | Performed by: INTERNAL MEDICINE

## 2025-01-10 RX ORDER — CEPHALEXIN 500 MG/1
500 CAPSULE ORAL 3 TIMES DAILY
COMMUNITY
Start: 2025-01-03 | End: 2025-01-10 | Stop reason: SDUPTHER

## 2025-01-10 RX ORDER — CEPHALEXIN 500 MG/1
500 CAPSULE ORAL 3 TIMES DAILY
Qty: 30 CAPSULE | Refills: 0 | Status: SHIPPED | OUTPATIENT
Start: 2025-01-10

## 2025-01-10 RX ORDER — TRIAMCINOLONE ACETONIDE 1 MG/G
CREAM TOPICAL
Qty: 45 G | Refills: 0 | Status: SHIPPED | OUTPATIENT
Start: 2025-01-10

## 2025-01-10 RX ORDER — MUPIROCIN CALCIUM 20 MG/G
CREAM TOPICAL 2 TIMES DAILY
COMMUNITY

## 2025-01-10 SDOH — ECONOMIC STABILITY: FOOD INSECURITY: WITHIN THE PAST 12 MONTHS, THE FOOD YOU BOUGHT JUST DIDN'T LAST AND YOU DIDN'T HAVE MONEY TO GET MORE.: NEVER TRUE

## 2025-01-10 SDOH — ECONOMIC STABILITY: FOOD INSECURITY: WITHIN THE PAST 12 MONTHS, YOU WORRIED THAT YOUR FOOD WOULD RUN OUT BEFORE YOU GOT MONEY TO BUY MORE.: NEVER TRUE

## 2025-01-10 ASSESSMENT — PATIENT HEALTH QUESTIONNAIRE - PHQ9
2. FEELING DOWN, DEPRESSED OR HOPELESS: NOT AT ALL
SUM OF ALL RESPONSES TO PHQ QUESTIONS 1-9: 0
SUM OF ALL RESPONSES TO PHQ9 QUESTIONS 1 & 2: 0
1. LITTLE INTEREST OR PLEASURE IN DOING THINGS: NOT AT ALL

## 2025-01-10 ASSESSMENT — ENCOUNTER SYMPTOMS: SHORTNESS OF BREATH: 0

## 2025-01-10 NOTE — PROGRESS NOTES
patient's (and/or legal guardian's) consent. Patient identification was verified, and a caregiver was present when appropriate.   The patient was located at Home: [address]  [facility] [place] [facility] [address]  Provider was located at Home (Appt Dept State): [place]  Confirm you are appropriately licensed, registered, or certified to deliver care in the state where the patient is located as indicated above. If you are not or unsure, please re-schedule the visit: Yes, I confirm.      --Keri Yanes on 1/10/2025 at 7:36 AM  An electronic signature was used to authenticate this note.

## 2025-04-28 ENCOUNTER — NURSE TRIAGE (OUTPATIENT)
Dept: OTHER | Facility: CLINIC | Age: 40
End: 2025-04-28

## 2025-04-28 ENCOUNTER — HOSPITAL ENCOUNTER (EMERGENCY)
Facility: HOSPITAL | Age: 40
Discharge: HOME | End: 2025-04-28
Attending: STUDENT IN AN ORGANIZED HEALTH CARE EDUCATION/TRAINING PROGRAM
Payer: COMMERCIAL

## 2025-04-28 ENCOUNTER — APPOINTMENT (OUTPATIENT)
Facility: HOSPITAL | Age: 40
End: 2025-04-28
Payer: COMMERCIAL

## 2025-04-28 VITALS
TEMPERATURE: 98 F | RESPIRATION RATE: 14 BRPM | HEART RATE: 70 BPM | OXYGEN SATURATION: 99 % | HEIGHT: 66 IN | BODY MASS INDEX: 23.38 KG/M2 | DIASTOLIC BLOOD PRESSURE: 74 MMHG | WEIGHT: 145.5 LBS | SYSTOLIC BLOOD PRESSURE: 114 MMHG

## 2025-04-28 DIAGNOSIS — S09.8XXA BLUNT HEAD TRAUMA, INITIAL ENCOUNTER: Primary | ICD-10-CM

## 2025-04-28 DIAGNOSIS — S80.811A ABRASION OF RIGHT LOWER EXTREMITY, INITIAL ENCOUNTER: ICD-10-CM

## 2025-04-28 DIAGNOSIS — S06.0X0A CONCUSSION WITHOUT LOSS OF CONSCIOUSNESS, INITIAL ENCOUNTER: ICD-10-CM

## 2025-04-28 PROCEDURE — 6360000002 HC RX W HCPCS: Performed by: STUDENT IN AN ORGANIZED HEALTH CARE EDUCATION/TRAINING PROGRAM

## 2025-04-28 PROCEDURE — 6370000000 HC RX 637 (ALT 250 FOR IP): Performed by: STUDENT IN AN ORGANIZED HEALTH CARE EDUCATION/TRAINING PROGRAM

## 2025-04-28 PROCEDURE — 70450 CT HEAD/BRAIN W/O DYE: CPT

## 2025-04-28 PROCEDURE — 99284 EMERGENCY DEPT VISIT MOD MDM: CPT

## 2025-04-28 PROCEDURE — 96372 THER/PROPH/DIAG INJ SC/IM: CPT

## 2025-04-28 RX ORDER — ACETAMINOPHEN 650 MG/1
650 SUPPOSITORY RECTAL
Status: COMPLETED | OUTPATIENT
Start: 2025-04-28 | End: 2025-04-28

## 2025-04-28 RX ORDER — ACETAMINOPHEN 500 MG
1000 TABLET ORAL
Status: DISCONTINUED | OUTPATIENT
Start: 2025-04-28 | End: 2025-04-28

## 2025-04-28 RX ORDER — ONDANSETRON 4 MG/1
4 TABLET, ORALLY DISINTEGRATING ORAL 3 TIMES DAILY PRN
Qty: 8 TABLET | Refills: 0 | Status: SHIPPED | OUTPATIENT
Start: 2025-04-28

## 2025-04-28 RX ORDER — KETOROLAC TROMETHAMINE 30 MG/ML
15 INJECTION, SOLUTION INTRAMUSCULAR; INTRAVENOUS ONCE
Status: COMPLETED | OUTPATIENT
Start: 2025-04-28 | End: 2025-04-28

## 2025-04-28 RX ADMIN — ACETAMINOPHEN 650 MG: 650 SUPPOSITORY RECTAL at 06:32

## 2025-04-28 RX ADMIN — KETOROLAC TROMETHAMINE 15 MG: 30 INJECTION, SOLUTION INTRAMUSCULAR at 06:37

## 2025-04-28 ASSESSMENT — PAIN SCALES - GENERAL
PAINLEVEL_OUTOF10: 6
PAINLEVEL_OUTOF10: 8

## 2025-04-28 ASSESSMENT — PAIN DESCRIPTION - LOCATION: LOCATION: HEAD;NECK

## 2025-04-28 ASSESSMENT — PAIN DESCRIPTION - PAIN TYPE: TYPE: ACUTE PAIN

## 2025-04-28 ASSESSMENT — PAIN DESCRIPTION - ORIENTATION: ORIENTATION: POSTERIOR

## 2025-04-28 NOTE — TELEPHONE ENCOUNTER
Reason for Disposition   Large swelling or bruise (> 2 inches or 5 cm)    Protocols used: Head Injury-ADULT-AH  Location of patient: [place]    Subjective: Caller states that she had a terrible fall a couple of hours ago in her bedroom where she tripped over her bed and hit the back of her head on her dresser and injured her R leg; Has huge lump on the back of her head that is continuing to swell and is bigger than 2 inches; Stated has cuts/abrasions to the R leg; She said she does not know if she lost consciousness because she is confused with what happened after she fell or does not remember; She has dizziness, nausea, and blurry vision and is in extreme pn; Mom is already driving pt to ED when they called    Current Symptoms: Huge lump on back of head, cuts/scrapes to back of R leg; Dizziness, nausea, blurry vision, and pain    Onset: 2 hours ago;     Associated Symptoms: NA    Pain Severity: 8/10; constant/severe        Recommended disposition: Go to ED Now    Care advice provided, patient verbalizes understanding; denies any other questions or concerns; instructed to call back for any new or worsening symptoms.    Patient/caller agrees to proceed to Nearest Emergency Department    Attention Provider:  Thank you for allowing me to participate in the care of your patient.  The patient was connected to triage in response to symptoms provided.   Please do not respond through this encounter as the response is not directed to a shared pool.

## 2025-04-28 NOTE — ED NOTES
Pt reports a pregnancy would be impossible and has requested POC preg be discontinued. MD Smith aware.